# Patient Record
Sex: MALE | Race: WHITE | Employment: FULL TIME | URBAN - METROPOLITAN AREA
[De-identification: names, ages, dates, MRNs, and addresses within clinical notes are randomized per-mention and may not be internally consistent; named-entity substitution may affect disease eponyms.]

---

## 2017-02-21 ENCOUNTER — DOCUMENTATION ONLY (OUTPATIENT)
Dept: VASCULAR SURGERY | Facility: CLINIC | Age: 67
End: 2017-02-21

## 2017-06-15 ENCOUNTER — OFFICE VISIT (OUTPATIENT)
Dept: FAMILY MEDICINE | Facility: CLINIC | Age: 67
End: 2017-06-15
Payer: COMMERCIAL

## 2017-06-15 VITALS
TEMPERATURE: 96.8 F | HEIGHT: 67 IN | HEART RATE: 78 BPM | WEIGHT: 194.2 LBS | RESPIRATION RATE: 20 BRPM | BODY MASS INDEX: 30.48 KG/M2 | OXYGEN SATURATION: 97 % | SYSTOLIC BLOOD PRESSURE: 115 MMHG | DIASTOLIC BLOOD PRESSURE: 70 MMHG

## 2017-06-15 DIAGNOSIS — J02.9 ACUTE VIRAL PHARYNGITIS: Primary | ICD-10-CM

## 2017-06-15 PROCEDURE — 99213 OFFICE O/P EST LOW 20 MIN: CPT | Performed by: FAMILY MEDICINE

## 2017-06-15 ASSESSMENT — PAIN SCALES - GENERAL: PAINLEVEL: MILD PAIN (3)

## 2017-06-15 NOTE — PROGRESS NOTES
"  SUBJECTIVE:                                                    Aquiles Mckeon is a 67 year old male who presents to clinic today for the following health issues:      Acute Illness   Acute illness concerns: Sore throat, Losing Voice, Exposed to Pneumonia  Onset: Yesterday    Fever: no     Chills/Sweats: no     Headache (location?): no     Sinus Pressure:YES- Possible Allergies    Conjunctivitis:  no    Ear Pain: no    Rhinorrhea: YES- Possible Allergies    Congestion: YES- Possibly a light congestion    Sore Throat: YES- 3/10     Cough: no    Wheeze: YES- Maybe a little    Decreased Appetite: no     Nausea: no     Vomiting: no     Diarrhea:  no     Dysuria/Freq.: no     Fatigue/Achiness: no     Sick/Strep Exposure: YES- Daughter has Pneumonia. Was with her over the weekend.      Therapies Tried and outcome: Zinc, Aspirin          Problem list and histories reviewed & adjusted, as indicated.  Additional history: as documented        Reviewed and updated as needed this visit by clinical staff       Reviewed and updated as needed this visit by Provider         ROS:   ROS: 10 point ROS neg other than the symptoms noted above in the HPI.      OBJECTIVE:                                                    /70  Pulse 78  Temp 96.8  F (36  C) (Temporal)  Resp 20  Ht 5' 6.93\" (1.7 m)  Wt 194 lb 3.2 oz (88.1 kg)  SpO2 97%  BMI 30.48 kg/m2  Body mass index is 30.48 kg/(m^2).  GENERAL: healthy, alert and no distress  EYES: Eyes grossly normal to inspection, extraocular movements - intact, and PERRL  HENT: ear canals- normal; TMs- normal; Nose- normal; Mouth- no ulcers, no lesions  HENT: mild redness of the anterior oral pharynx without edema or exudates  NECK: no tenderness, no adenopathy, no asymmetry, no masses, no stiffness; thyroid- normal to palpation  RESP: lungs clear to auscultation - no rales, no rhonchi, no wheezes  CV: regular rates and rhythm, normal S1 S2, no S3 or S4 and no murmur, no click or rub " -  ABDOMEN: soft, no tenderness, no  hepatosplenomegaly, no masses, normal bowel sounds  MS: extremities- no gross deformities noted, no edema  SKIN: no suspicious lesions, no rashes  NEURO: strength and tone- normal, sensory exam- grossly normal, mentation- intact, speech- normal, reflexes- symmetric  PSYCH: Alert and oriented times 3; speech- coherent , normal rate and volume; able to articulate logical thoughts, able to abstract reason, no tangential thoughts, no hallucinations or delusions, affect- normal    Diagnostic test results:  Diagnostic Test Results:  none      ASSESSMENT/PLAN:                                                    1. Acute viral pharyngitis  Clinically appearing to be a viral infection based on findings and history. Plan is for symptomatic therapy and follow up as needed.      Follow up with Provider - Follow up as needed or as planned for ongoing care.   See Patient Instructions    The patient understood the rational for the diagnosis and treatment plan. All questions were answered to best of my ability and the patient's satisfaction.      Steev Garcia MD  [facility]

## 2017-06-15 NOTE — MR AVS SNAPSHOT
"              After Visit Summary   6/15/2017    Aquiles Mckeon    MRN: [id number]           Patient Information     Date Of Birth          1950        Visit Information        Provider Department      6/15/2017 8:40 AM Steve Garcia MD [facility] [place]        Today's Diagnoses     Acute viral pharyngitis    -  1       Follow-ups after your visit        Follow-up notes from your care team     Return if symptoms worsen or fail to improve.      Who to contact     If you have questions or need follow up information about today's clinic visit or your schedule please contact [facility] directly at [phone].  Normal or non-critical lab and imaging results will be communicated to you by MyChart, letter or phone within 4 business days after the clinic has received the results. If you do not hear from us within 7 days, please contact the clinic through [facility] or phone. If you have a critical or abnormal lab result, we will notify you by phone as soon as possible.  Submit refill requests through TM or call your pharmacy and they will forward the refill request to us. Please allow 3 business days for your refill to be completed.          Additional Information About Your Visit        MyChart Information     TM lets you send messages to your doctor, view your test results, renew your prescriptions, schedule appointments and more. To sign up, go to [email or web address] . Click on \"Log in\" on the left side of the screen, which will take you to the Welcome page. Then click on \"Sign up Now\" on the right side of the page.     You will be asked to enter the access code listed below, as well as some personal information. Please follow the directions to create your username and password.     Your access code is: [email or web address]  Expires: 2017  9:00 AM     Your access code will  in 90 days. If you need help or a new code, please call your [facility] clinic or [phone].      " "  Care EveryWhere ID     This is your Care EveryWhere ID. This could be used by other organizations to access your [facility] medical records  [id number]        Your Vitals Were     Pulse Temperature Respirations Height Pulse Oximetry BMI (Body Mass Index)    78 96.8  F (36  C) (Temporal) 20 5' 6.93\" (1.7 m) 97% 30.48 kg/m2       Blood Pressure from Last 3 Encounters:   06/15/17 115/70   09/29/16 132/86   06/06/16 132/80    Weight from Last 3 Encounters:   06/15/17 194 lb 3.2 oz (88.1 kg)   09/29/16 199 lb (90.3 kg)   06/06/16 195 lb 8 oz (88.7 kg)              Today, you had the following     No orders found for display       Primary Care Provider Office Phone # Fax #    Steve Garcia [phone]       [facility] [id number] [place] [id number]        Thank you!     Thank you for choosing [facility]  for your care. Our goal is always to provide you with excellent care. Hearing back from our patients is one way we can continue to improve our services. Please take a few minutes to complete the written survey that you may receive in the mail after your visit with us. Thank you!             Your Updated Medication List - Protect others around you: Learn how to safely use, store and throw away your medicines at www.disposemymeds.org.          This list is accurate as of: 6/15/17  9:00 AM.  Always use your most recent med list.                   Brand Name Dispense Instructions for use    aspirin 325 MG tablet     120 tablet    Take 1.5 tablets (487.5 mg) by mouth daily       MAGNESIUM PO          meloxicam 15 MG tablet    MOBIC     TAKE 1 TABLET BY MOUTH EVERY DAY AS NEEDED       tadalafil 10 MG tablet    CIALIS    12 tablet    Take 0.5-1 tablets (5-10 mg) by mouth daily as needed for erectile dysfunction Never use with nitroglycerin, terazosin or doxazosin.         "

## 2017-06-15 NOTE — NURSING NOTE
"No chief complaint on file.      Initial /90  Pulse 78  Temp 96.8  F (36  C) (Temporal)  Resp 20  Ht 5' 6.93\" (1.7 m)  Wt 194 lb 3.2 oz (88.1 kg)  BMI 30.48 kg/m2 Estimated body mass index is 30.48 kg/(m^2) as calculated from the following:    Height as of this encounter: 5' 6.93\" (1.7 m).    Weight as of this encounter: 194 lb 3.2 oz (88.1 kg).  Medication Reconciliation: complete     Coty Morton CMA  "

## 2018-10-29 ENCOUNTER — OFFICE VISIT (OUTPATIENT)
Dept: FAMILY MEDICINE | Facility: CLINIC | Age: 68
End: 2018-10-29
Payer: COMMERCIAL

## 2018-10-29 VITALS
TEMPERATURE: 98.5 F | BODY MASS INDEX: 31.71 KG/M2 | HEIGHT: 67 IN | HEART RATE: 83 BPM | DIASTOLIC BLOOD PRESSURE: 76 MMHG | WEIGHT: 202 LBS | SYSTOLIC BLOOD PRESSURE: 122 MMHG | OXYGEN SATURATION: 96 %

## 2018-10-29 DIAGNOSIS — J20.9 ACUTE BRONCHITIS, UNSPECIFIED ORGANISM: ICD-10-CM

## 2018-10-29 DIAGNOSIS — J02.9 ACUTE PHARYNGITIS, UNSPECIFIED ETIOLOGY: Primary | ICD-10-CM

## 2018-10-29 PROCEDURE — 99213 OFFICE O/P EST LOW 20 MIN: CPT | Performed by: FAMILY MEDICINE

## 2018-10-29 RX ORDER — AZITHROMYCIN 250 MG/1
500 TABLET, FILM COATED ORAL DAILY
Qty: 10 TABLET | Refills: 0 | Status: SHIPPED | OUTPATIENT
Start: 2018-10-29 | End: 2019-01-07

## 2018-10-29 RX ORDER — CLINDAMYCIN HCL 300 MG
CAPSULE ORAL
Refills: 1 | COMMUNITY
Start: 2018-07-17 | End: 2019-01-07

## 2018-10-29 ASSESSMENT — PAIN SCALES - GENERAL: PAINLEVEL: MODERATE PAIN (4)

## 2018-10-29 NOTE — PROGRESS NOTES
"  SUBJECTIVE:   Aquiles Mckeon is a 68 year old male who presents to clinic today for the following health issues:      HPI  Acute Illness--    Symptoms of a persistent and slightly progressive sore throat with early symptoms of developing a lower respiratory tract infection. No major fever noted. Former smoker. No major dyspnea. No sinus related symptoms noted.     Acute illness concerns: sore throat, cough    Onset: 4 days now     Fever: no     Chills/Sweats: no     Headache (location?): YES- some     Sinus Pressure:no    Conjunctivitis:  no    Ear Pain: no    Rhinorrhea: no     Congestion: YES- chest     Sore Throat: YES     Cough: YES-productive of yellow sputum    Wheeze: no     Decreased Appetite: no    Nausea: no    Vomiting: no    Diarrhea:  YES- one time     Dysuria/Freq.: no    Fatigue/Achiness: YES    Sick/Strep Exposure: no      Therapies Tried and outcome: day/Nyquil     Problem list and histories reviewed & adjusted, as indicated.  Additional history: as documented        BP Readings from Last 3 Encounters:   10/29/18 122/76   06/15/17 115/70   09/29/16 132/86    Wt Readings from Last 3 Encounters:   10/29/18 202 lb (91.6 kg)   06/15/17 194 lb 3.2 oz (88.1 kg)   09/29/16 199 lb (90.3 kg)                    ROS:   ROS: 10 point ROS neg other than the symptoms noted above in the HPI.      OBJECTIVE:                                                    /76  Pulse 83  Temp 98.5  F (36.9  C) (Oral)  Ht 5' 7\" (1.702 m)  Wt 202 lb (91.6 kg)  SpO2 96%  BMI 31.64 kg/m2  Body mass index is 31.64 kg/(m^2).  GENERAL: healthy, alert and no distress  EYES: Eyes grossly normal to inspection, extraocular movements - intact, and PERRL  HENT: ear canals and TM's normal and some redness of the oral pharynx without soft tissue edema. Post oral pharyngeal surgery for sleep apnea.  NECK: no tenderness, no adenopathy, no asymmetry, no masses, no stiffness; thyroid- normal to palpation  RESP: lungs clear to " auscultation - no rales, no rhonchi, no wheezes  CV: regular rates and rhythm, normal S1 S2, no S3 or S4 and no murmur, no click or rub -  ABDOMEN: soft, no tenderness, no  hepatosplenomegaly, no masses, normal bowel sounds  MS: extremities- no gross deformities noted, no edema  SKIN: no suspicious lesions, no rashes  NEURO: strength and tone- normal, sensory exam- grossly normal, mentation- intact, speech- normal, reflexes- symmetric  PSYCH: Alert and oriented times 3; speech- coherent , normal rate and volume; able to articulate logical thoughts, able to abstract reason, no tangential thoughts, no hallucinations or delusions, affect- normal    Diagnostic test results:  Diagnostic Test Results:  none      ASSESSMENT/PLAN:                                                    1. Acute pharyngitis, unspecified etiology  Not suspecting strep but with progressive symptoms and developing lower respiratory symptoms will empirically treat with Zithromax.  - azithromycin (ZITHROMAX) 250 MG tablet; Take 2 tablets (500 mg) by mouth daily  Dispense: 10 tablet; Refill: 0    2. Acute bronchitis, unspecified organism  Treating empirically with oral Zithromax.  - azithromycin (ZITHROMAX) 250 MG tablet; Take 2 tablets (500 mg) by mouth daily  Dispense: 10 tablet; Refill: 0      Follow up with Provider - General physical due in the next 3 months.  See Patient Instructions    The patient understood the rational for the diagnosis and treatment plan. All questions were answered to best of my ability and the patient's satisfaction.      Steve Garcia MD  [facility]

## 2018-10-29 NOTE — MR AVS SNAPSHOT
After Visit Summary   10/29/2018    Aquiles Mckeon    MRN: [id number]           Patient Information     Date Of Birth          1950        Visit Information        Provider Department      10/29/2018 9:40 AM Steve Garcia MD [facility] [place]        Today's Diagnoses     Acute pharyngitis, unspecified etiology    -  1    Acute bronchitis, unspecified organism          Care Instructions    These are new changes to your current plan of care based on today's visit:    Medications stopped    Medications to be started Zithromax 500 mg daily for five days   Change dose of this medication    New treatments        Follow up appointments:    1.  FOLLOW UP WITH YOUR PRIMARY CARE PROVIDER: As needed. Schedule a general physical with fasting blood work.    Steve Garcia MD                Follow-ups after your visit        Follow-up notes from your care team     Return in about 3 months (around 1/29/2019) for Physical Exam, Lab Work.      Who to contact     If you have questions or need follow up information about today's clinic visit or your schedule please contact [facility] directly at [phone].  Normal or non-critical lab and imaging results will be communicated to you by MyChart, letter or phone within 4 business days after the clinic has received the results. If you do not hear from us within 7 days, please contact the clinic through [facility] or phone. If you have a critical or abnormal lab result, we will notify you by phone as soon as possible.  Submit refill requests through [facility] or call your pharmacy and they will forward the refill request to us. Please allow 3 business days for your refill to be completed.          Additional Information About Your Visit        MyChart Information     [facility] lets you send messages to your doctor, view your test results, renew your prescriptions, schedule appointments and more. To sign up, go to [email or web address] . Click on  "\"Log in\" on the left side of the screen, which will take you to the Welcome page. Then click on \"Sign up Now\" on the right side of the page.     You will be asked to enter the access code listed below, as well as some personal information. Please follow the directions to create your username and password.     Your access code is: [email or web address]  Expires: 2019 10:08 AM     Your access code will  in 90 days. If you need help or a new code, please call your [facility] or [phone].        Care EveryWhere ID     This is your Care EveryWhere ID. This could be used by other organizations to access your [facility] medical records  [id number]        Your Vitals Were     Pulse Temperature Height Pulse Oximetry BMI (Body Mass Index)       83 98.5  F (36.9  C) (Oral) 5' 7\" (1.702 m) 96% 31.64 kg/m2        Blood Pressure from Last 3 Encounters:   10/29/18 122/76   06/15/17 115/70   16 132/86    Weight from Last 3 Encounters:   10/29/18 202 lb (91.6 kg)   06/15/17 194 lb 3.2 oz (88.1 kg)   16 199 lb (90.3 kg)              Today, you had the following     No orders found for display         Today's Medication Changes          These changes are accurate as of 10/29/18 10:09 AM.  If you have any questions, ask your nurse or doctor.               Start taking these medicines.        Dose/Directions    azithromycin 250 MG tablet   Commonly known as:  ZITHROMAX   Used for:  Acute pharyngitis, unspecified etiology, Acute bronchitis, unspecified organism   Started by:  Steve Garcia MD        Dose:  500 mg   Take 2 tablets (500 mg) by mouth daily   Quantity:  10 tablet   Refills:  0            Where to get your medicines      These medications were sent to [facility]/pharmacy #[id number] - [place] - [address] AT [facility]  [address] [place] [address]     Phone:  [phone]     azithromycin 250 MG tablet                Primary Care Provider Office Phone # Fax #    " Steve Garcia -[phone]       [address] [facility] [address]        Equal Access to Services     SHAHEEN GUTIERREZ : Hadii milton walker percy March, waemilyda luqgeovanna, qamaita kaalmada sebastian, michael collinsgee brianne. So [facility] [phone].    ATENCIÓN: Si habla español, tiene a gerard disposición servicios gratuitos de asistencia lingüística. Llame al [phone].    We comply with applicable federal civil rights laws and [place] laws. We do not discriminate on the basis of race, color, national origin, age, disability, sex, sexual orientation, or gender identity.            Thank you!     Thank you for choosing [facility]  for your care. Our goal is always to provide you with excellent care. Hearing back from our patients is one way we can continue to improve our services. Please take a few minutes to complete the written survey that you may receive in the mail after your visit with us. Thank you!             Your Updated Medication List - Protect others around you: Learn how to safely use, store and throw away your medicines at www.disposemymeds.org.          This list is accurate as of 10/29/18 10:09 AM.  Always use your most recent med list.                   Brand Name Dispense Instructions for use Diagnosis    ANTIHISTAMINE PO      Take 1 tablet daily for itching.        aspirin 325 MG tablet     120 tablet    Take 1.5 tablets (487.5 mg) by mouth daily        azithromycin 250 MG tablet    ZITHROMAX    10 tablet    Take 2 tablets (500 mg) by mouth daily    Acute pharyngitis, unspecified etiology, Acute bronchitis, unspecified organism       clindamycin 300 MG capsule    CLEOCIN     TAKE 2 CAPSULES BY MOUTH 1 HOUR PRIOR TO DENTAL APPOINTMENT.        MAGNESIUM PO           meloxicam 15 MG tablet    MOBIC     TAKE 1 TABLET BY MOUTH EVERY DAY AS NEEDED

## 2018-10-29 NOTE — PATIENT INSTRUCTIONS
These are new changes to your current plan of care based on today's visit:    Medications stopped    Medications to be started Zithromax 500 mg daily for five days   Change dose of this medication    New treatments        Follow up appointments:    1.  FOLLOW UP WITH YOUR PRIMARY CARE PROVIDER: As needed. Schedule a general physical with fasting blood work.    Steve Garcia MD

## 2019-01-07 ENCOUNTER — OFFICE VISIT (OUTPATIENT)
Dept: FAMILY MEDICINE | Facility: CLINIC | Age: 69
End: 2019-01-07
Payer: COMMERCIAL

## 2019-01-07 VITALS
DIASTOLIC BLOOD PRESSURE: 78 MMHG | SYSTOLIC BLOOD PRESSURE: 120 MMHG | HEART RATE: 88 BPM | WEIGHT: 200.1 LBS | BODY MASS INDEX: 32.16 KG/M2 | TEMPERATURE: 97.2 F | HEIGHT: 66 IN | RESPIRATION RATE: 18 BRPM

## 2019-01-07 DIAGNOSIS — M54.50 ACUTE LEFT-SIDED LOW BACK PAIN WITHOUT SCIATICA: Primary | ICD-10-CM

## 2019-01-07 DIAGNOSIS — Z87.891 PERSONAL HISTORY OF TOBACCO USE: ICD-10-CM

## 2019-01-07 PROCEDURE — 99214 OFFICE O/P EST MOD 30 MIN: CPT | Performed by: PHYSICIAN ASSISTANT

## 2019-01-07 PROCEDURE — G0296 VISIT TO DETERM LDCT ELIG: HCPCS | Performed by: PHYSICIAN ASSISTANT

## 2019-01-07 RX ORDER — METHOCARBAMOL 750 MG/1
750 TABLET, FILM COATED ORAL 3 TIMES DAILY
Qty: 30 TABLET | Refills: 0 | Status: SHIPPED | OUTPATIENT
Start: 2019-01-07 | End: 2021-03-11

## 2019-01-07 RX ORDER — MELOXICAM 15 MG/1
15 TABLET ORAL DAILY
Qty: 20 TABLET | Refills: 0 | Status: SHIPPED | OUTPATIENT
Start: 2019-01-07 | End: 2019-01-23

## 2019-01-07 ASSESSMENT — PAIN SCALES - GENERAL: PAINLEVEL: SEVERE PAIN (7)

## 2019-01-07 ASSESSMENT — MIFFLIN-ST. JEOR: SCORE: 1619.53

## 2019-01-07 NOTE — PATIENT INSTRUCTIONS
Low back pain-  Ice or heat    Meloxicam- this is an NSAID. Short course of NSAIDs (as discussed or AVS), take with food, stop if GI upset occurs  NSAIDs alleviate pain and treat inflammation.   You are being prescribed an NSAID. Do not take any other over the counter NSAIDs or prescription NSAIDs when on this medication (examples include Advil, Aleve, Motrin, ibuprofen, diclofenac, meloxicam, indomethacin).  Take with food. Stop taking if you get an upset stomach.   Hydrate well when you take this medication.     Hold the aspirin when you are taking the meloxicam.    Methocarbamol- You were prescribed a muscle relaxer. This can make you dizzy and/or drowsy.   Do NOT drink alcohol while taking.   Try for the first time at home (ie before bed) so you know how it affects you.   Do not drive while taking if you feel dizzy or drowsy.     When You Have Low Back Pain    Caring for Your Back  You are not alone.    Low back pain is very common. Nearly half of all adults have low back pain in any given year. The good news is that back pain is rarely a danger to your health. Most people can manage their back pain on their own and about half of them start feeling better within 2 weeks. In 9 out of 10 cases, low back pain goes away or no longer limits daily activity within 6 weeks.     Your outlook is good!    Your symptoms tell us that your low back pain is most likely not a danger to you. Most of the time we do not know the exact cause of low back pain, even if you see a doctor or have an MRI. However, treatment can still work without knowing the cause of the pain. Less than 1 in 100 people need surgery for their back pain.     What can I do about my low back pain?     There are three things you can do to ease low back pain and help it go away.    Use heat or cold packs.    Take medicine as directed.    Use positions, movements and exercises.     Using heat or cold packs    Try cold packs or gentle heat to ease your pain.  Use whichever gives you the most relief. Apply the cold pack or heat for 15 minutes at a time, as often as needed.    Taking medicine      If your doctor has prescribed medicine, be sure to follow the directions.    If you take over-the-counter medicine, read and follow the directions.    Talk to your doctor if you have any questions.     Using positions, movements and exercises    Research tells us that moving your joints and muscles can help you recover from back pain. Such activity should be simple and gentle. Use the positions in the photos as well as walking to help relieve your pain. Try taking a short walk every 3 to 4 hours during the day. Walk for a few minutes inside your home or take longer walks outside, on a treadmill or at a mall. Slowly increase the amount of time you walk. Expect discomfort when you begin, but it should lessen as your back starts to heal. When your back feels better, walk daily to keep your back and body healthy.    Finding a comfortable position    When your back pain is new, certain positions will ease your pain. Gently try each of the positions below until you find one that is helpful. Once you find a position of comfort, use it as often as you like when you are resting. You will recover faster if you combine rest with activity.         Lie on your back with your legs bent. You can do this by placing a pillow under your knees. Or you may lie on the floor and rest your lower legs on the seat of a chair.       Lie on your side with your knees bent, and place a pillow between your knees.       Lie on your stomach over pillows.      When should I call my doctor?    Your back pain should improve over the first couple of weeks. As it improves, you should be able to return to your normal activities. But call your doctor if:    You have a sudden change in your ability to control your bladder or bowels.    You feel tingling in your groin or legs.    The pain spreads down your leg and into  your foot.    Your toes, feet or leg muscles feel weak.    You feel generally unwell or sick.    Your pain does not get better or gets worse.      If you are deaf or hard of hearing, please let us know. We provide many free services including sign language interpreters,oral interpreters, TTYs, telephone amplifiers, note takers and written materials.    For informational purposes only. Not to replace the advice of your health care provider. Copyright   2013 [place] [employer] [facility]. All rights reserved. [employer] 134853 - Rev 06/14.      Lung Cancer Screening   Frequently Asked Questions  If you are at high-risk for lung cancer, getting screened with low-dose computed tomography (LDCT) every year can help save your life. This handout offers answers to some of the most common questions about lung cancer screening. If you have other questions, please call [phone] ([phone]).     What is it?  Lung cancer screening uses special X-ray technology to create an image of your lung tissue. The exam is quick and easy and takes less than 10 seconds. We don t give you any medicine or use any needles. You can eat before and after the exam. You don t need to change your clothes as long as the clothing on your chest doesn t contain metal. But, you do need to be able to hold your breath for at least 6 seconds during the exam.    What is the goal of lung cancer screening?  The goal of lung cancer screening is to save lives. Many times, lung cancer is not found until a person starts having physical symptoms. Lung cancer screening can help detect lung cancer in the earliest stages when it may be easier to treat.    Who should be screened for lung cancer?  We suggest lung cancer screening for anyone who is at high-risk for lung cancer. You are in the high-risk group if you:      are between the ages of 55 and 79, and    have smoked at least 1 pack of cigarettes a day for 30 or more years, and    still smoke or have quit  within the past 15 years.    However, if you have a new cough or shortness of breath, you should talk to your doctor before being screened.    Some national lung health advocacy groups also recommend screening for people ages 50 to 79 who have smoked an average of 1 pack of cigarettes a day for 20 years. They must also have at least 1 other risk factor for lung cancer, not including exposure to secondhand smoke. Other risk factors are having had cancer in the past, emphysema, pulmonary fibrosis, COPD, a family history of lung cancer, or exposure to certain materials such as arsenic, asbestos, beryllium, cadmium, chromium, diesel fumes, nickel, radon or silica. Your care team can help you know if you have one of these risk factors.     Why does it matter if I have symptoms?  Certain symptoms can be a sign that you have a condition in your lungs that should be checked and treated by your doctor. These symptoms include fever, chest pain, a new or changing cough, shortness of breath that you have never felt before, coughing up blood or unexplained weight loss. Having any of these symptoms can greatly affect the results of lung cancer screening.       Should all smokers get an LDCT lung cancer screening exam?  It depends. Lung cancer screening is for a very specific group of men and women who have a history of heavy smoking over a long period of time (see  Who should be screened for lung cancer  above).  I am in the high-risk group, but have been diagnosed with cancer in the past. Is LDCT lung cancer screening right for me?  In some cases, you should not have LDCT lung screening, such as when your doctor is already following your cancer with CT scan studies. Your doctor will help you decide if LDCT lung screening is right for you.  Do I need to have a screening exam every year?  Yes. If you are in the high-risk group described earlier, you should get an LDCT lung cancer screening exam every year until you are 79, or are  no longer willing or able to undergo screening and possible procedures to diagnose and treat lung cancer.  How effective is LDCT at preventing death from lung cancer?  Studies have shown that LDCT lung cancer screening can lower the risk of death from lung cancer by 20 percent in people who are at high-risk.  What are the risks?  There are some risks and limitations of LDCT lung cancer screening. We want to make sure you understand the risks and benefits, so please let us know if you have any questions. Your doctor may want to talk with you more about these risks.    Radiation exposure: As with any exam that uses radiation, there is a very small increased risk of cancer. The amount of radiation in LDCT is small--about the same amount a person would get from a mammogram. Your doctor orders the exam when he or she feels the potential benefits outweigh the risks.    False negatives: No test is perfect, including LDCT. It is possible that you may have a medical condition, including lung cancer, that is not found during your exam. This is called a false negative result.    False positives and more testing: LDCT very often finds something in the lung that could be cancer, but in fact is not. This is called a false positive result. False positive tests often cause anxiety. To make sure these findings are not cancer, you may need to have more tests. These tests will be done only if you give us permission. Sometimes patients need a treatment that can have side effects, such as a biopsy. For more information on false positives, see  What can I expect from the results?     Findings not related to lung cancer: Your LDCT exam also takes pictures of areas of your body next to your lungs. In a very small number of cases, the CT scan will show an abnormal finding in one of these areas, such as your kidneys, adrenal glands, liver or thyroid. This finding may not be serious, but you may need more tests. Your doctor can help you  decide what other tests you may need, if any.  What can I expect from the results?  About 1 out of 4 LDCT exams will find something that may need more tests. Most of the time, these findings are lung nodules. Lung nodules are very small collections of tissue in the lung. These nodules are very common, and the vast majority--more than 97 percent--are not cancer (benign). Most are normal lymph nodes or small areas of scarring from past infections.  But, if a small lung nodule is found to be cancer, the cancer can be cured more than 90 percent of the time. To know if the nodule is cancer, we may need to get more images before your next yearly screening exam. If the nodule has suspicious features (for example, it is large, has an odd shape or grows over time), we will refer you to a specialist for further testing.  Will my doctor also get the results?  Yes. Your doctor will get a copy of your results.  Is it okay to keep smoking now that there s a cancer screening exam?  No. Tobacco is one of the strongest cancer-causing agents. It causes not only lung cancer, but other cancers and cardiovascular (heart) diseases as well. The damage caused by smoking builds over time. This means that the longer you smoke, the higher your risk of disease. While it is never too late to quit, the sooner you quit, the better.  Where can I find help to quit smoking?  The best way to prevent lung cancer is to stop smoking. If you have already quit smoking, congratulations and keep it up! For help on quitting smoking, please call [facility] at 8-935-795-WIQX ([phone]) or the American [employer] Society at 1-694.803.5762 to find local resources near you.  One-on-one health coaching:  If you d prefer to work individually with a health care provider on tobacco cessation, we offer:      Medication Therapy Management:  Our specially trained pharmacists work closely with you and your doctor to help you quit smoking.  Call [phone] or [phone] (toll  free).     Can Do: Health coaching offered by [facility].  [email or web address]

## 2019-01-07 NOTE — PROGRESS NOTES
"  SUBJECTIVE:   Aquiles Mckeon is a 68 year old male who presents to clinic today for the following health issues:  Patient no longer taking clindamycin. Please remove if appropriate.    History of Present Illness   Frequency of exercise:  2-3 days/week  Duration of exercise:  15-30 minutes  Taking medications regularly:  Yes  Medication side effects:  None  Additional concerns today:  No    Back Pain       Duration: 2 weeks        Specific cause: lifting  Life long- initial injury when young playing hockey. Normally no pain w/daily activity, but flares up now and again.   Most recently can't recall specific event. Did do some lifting of a dresser from [employer] up stairs and came on after.  Does ok during the day.   More pain with position change, sitting to standing. Harder to get up from laying.  Golf trip end of month and wants to be able to play.   No N/T, no weakness in legs. No bowel or bladder changes. No fevers/chills.   No trauma/falls prior to this.     Description:   Location of pain: low back left  Character of pain: sharp and \"uncomfortable\"  Pain radiation:radiates into the left buttocks. Not down backs of legs.   New numbness or weakness in legs, not attributed to pain:  no     Intensity: Currently 6-7/10    History:   Pain interferes with job: Not applicable  History of back problems: previous back injury when younger playing hockey  Any previous MRI or X-rays: None  Sees a specialist for back pain:  No  Therapies tried without relief: none    Alleviating factors:   Improved by: old meloxicam and metaxalone. Patient states these medications gave him relief, has both bottles with from 2016.  Also recalls prednisone helping in the past too    Precipitating factors:  Worsened by: Lying [place] and when going to get up.      Accompanying Signs & Symptoms:  Risk of Fracture:  Did slip backward on ice when snow blowing week ago.   Risk of Cauda Equina:  None  Risk of Infection:  None  Risk of Cancer:  None  Risk " of Ankylosing Spondylitis:  Onset at age <35, male, AND morning back stiffness. no         Problem list and histories reviewed & adjusted, as indicated.  Additional history: as documented      Patient Active Problem List   Diagnosis     Hyperlipidemia with target LDL less than 160     History of colonic polyps     Erectile dysfunction, unspecified erectile dysfunction type     Advanced directives, counseling/discussion     History of tobacco use     Past Surgical History:   Procedure Laterality Date     COLONOSCOPY  2001    Reported as having a polyp removed     COLONOSCOPY  2004    Clear by report     COLONOSCOPY N/A 1/29/2016    Procedure: COLONOSCOPY;  Surgeon: Juanjo Martínez MD;  Location: [facility] OR     COLONOSCOPY WITH CO2 INSUFFLATION N/A 1/29/2016    Procedure: COLONOSCOPY WITH CO2 INSUFFLATION;  Surgeon: Juanjo Martínez MD;  Location: [facility] OR       Social History     Tobacco Use     Smoking status: Former Smoker     Packs/day: 0.80     Years: 40.00     Pack years: 32.00     Types: Cigarettes     Last attempt to quit: 5/1/2015     Years since quitting: 3.6     Smokeless tobacco: Never Used     Tobacco comment: quit smoking 5/01/2015   Substance Use Topics     Alcohol use: Yes     Alcohol/week: 7.0 oz     Types: 14 Standard drinks or equivalent per week     Comment: Beer mainly-5-6 per week     Family History   Problem Relation Age of Onset     Heart Disease Father         CHF     Diabetes No family hx of      Hypertension No family hx of      Breast Cancer No family hx of      Prostate Cancer No family hx of      Mental Illness No family hx of      Osteoporosis No family hx of      Obesity No family hx of          Current Outpatient Medications   Medication Sig Dispense Refill     aspirin 325 MG tablet Take 1.5 tablets (487.5 mg) by mouth daily 120 tablet      MAGNESIUM PO        Triprolidine-Pseudoephedrine (ANTIHISTAMINE PO) Take 1 tablet daily for itching.       meloxicam (MOBIC) 15 MG  "tablet TAKE 1 TABLET BY MOUTH EVERY DAY AS NEEDED  0     Allergies   Allergen Reactions     Ampicillin      BP Readings from Last 3 Encounters:   01/07/19 124/90   10/29/18 122/76   06/15/17 115/70    Wt Readings from Last 3 Encounters:   01/07/19 90.8 kg (200 lb 1.6 oz)   10/29/18 91.6 kg (202 lb)   06/15/17 88.1 kg (194 lb 3.2 oz)                  Labs reviewed in EPIC    ROS:  Constitutional, HEENT, cardiovascular, pulmonary, gi and gu systems are negative, except as otherwise noted.    OBJECTIVE:     /78   Pulse 88   Temp 97.2  F (36.2  C) (Temporal)   Resp 18   Ht 1.667 m (5' 5.63\")   Wt 90.8 kg (200 lb 1.6 oz)   BMI 32.66 kg/m    Body mass index is 32.66 kg/m .  GENERAL: healthy, alert and no distress  MS: Lumbar Spine: No skin changes noted. Tender with palpation of lumbar para-spinous muscles and upper buttocks.  Non-tender into gluteal area and with pressure along posterior thigh. Not TTP into calf muscle.  Has normal forward bending to nearly 90 degrees; normal twisting, lateral bending of back. No pain with back extension.  Is able to walk on heels and toes without difficulty.  Normal gait.   Strength 5/5 with leg extension, ankle ROM, and great toe flex/ext.  Straight leg raise normal. No edema. Pulses normal. Patellar reflexes 2+ and symmetric. Achilles reflexes 1-2+ symmetric. Sensation intact to light touch of LE.   Hip: left: nontender into groin or anterior thigh, normal ROM    Diagnostic Test Results:  none     ASSESSMENT/PLAN:     1. Acute left-sided low back pain without sciatica  Short course of meloxicam- take with food. Stop if GI upset. (Normal kidney function, no ulcer hx)  Muscle relaxer at bedtime, precautions discussed- no alcohol use or driving  Stretching, ice/heat  Follow up if sx not improving  - methocarbamol (ROBAXIN) 750 MG tablet; Take 1 tablet (750 mg) by mouth 3 times daily  Dispense: 30 tablet; Refill: 0  - meloxicam (MOBIC) 15 MG tablet; Take 1 tablet (15 mg) by " mouth daily  Dispense: 20 tablet; Refill: 0    2. Personal history of tobacco use  Counseled on lung cancer screening. He has been quit 3 yrs. He has a 40packyrhx.   He is interested in screening  - Prof Fee: Shared Decision Making Visit for Lung Cancer Screening  - CT Chest Lung Cancer Scrn Low Dose wo; Future    Follow Up: The patient was instructed to contact clinic for worsening symptoms, non-improvement as expected/discussed, and for questions regarding medications or treatment plan. Discussed parameters for follow up and included in After Visit Summary given to patient.      Lyubov Almanza PA-C    [facility]  Lung Cancer Screening Shared Decision Making Visit     Aquiles Mckeon is eligible for lung cancer screening on the basis of the information provided in my signed lung cancer screening order.     I have discussed with patient the risks and benefits of screening for lung cancer with low-dose CT.     The risks include:  radiation exposure: one low dose chest CT has as much ionizing radiation as about 15 chest x-rays or 6 months of background radiation living in [place]    false positives: 96% of positive findings/nodules are NOT cancer, but some might still require additional diagnostic evaluation, including biopsy  over-diagnosis: some slow growing cancers that might never have been clinically significant will be detected and treated unnecessarily     The benefit of early detection of lung cancer is contingent upon adherence to annual screening or more frequent follow up if indicated.     Furthermore, reaping the benefits of screening requires Aquiles Mckeon to be willing and physically able to undergo diagnostic procedures, if indicated. Although no specific guide is available for determining severity of comorbidities, it is reasonable to withhold screening in patients who have greater mortality risk from other diseases.     We did discuss that the only way to prevent lung cancer is to not  smoke. Smoking cessation assistance was not offered- he is not smoking.     I did not offer risk estimation using a calculator such as this one:    ShouldIScreen

## 2019-01-23 DIAGNOSIS — M54.50 ACUTE LEFT-SIDED LOW BACK PAIN WITHOUT SCIATICA: ICD-10-CM

## 2019-01-24 RX ORDER — MELOXICAM 15 MG/1
TABLET ORAL
Qty: 20 TABLET | Refills: 0 | Status: SHIPPED | OUTPATIENT
Start: 2019-01-27 | End: 2021-03-11

## 2019-01-24 NOTE — TELEPHONE ENCOUNTER
"Requested Prescriptions   Pending Prescriptions Disp Refills     meloxicam (MOBIC) 15 MG tablet [Pharmacy Med Name: MELOXICAM 15 MG TABLET] 20 tablet 0     Sig: TAKE 1 TABLET BY MOUTH EVERY DAY    NSAID Medications Failed - 1/23/2019  8:54 PM       Failed - Normal ALT on file in past 12 months    No lab results found.         Failed - Normal AST on file in past 12 months    No lab results found.         Failed - Patient is age 6-64 years       Failed - Normal CBC on file in past 12 months    Recent Labs   Lab Test 09/29/16  1350   WBC 9.6   RBC 4.88   HGB 14.4   HCT 44.2                   Failed - Normal serum creatinine on file in past 12 months    Recent Labs   Lab Test 09/29/16  1350   CR 1.05            Passed - Blood pressure under 140/90 in past 12 months    BP Readings from Last 3 Encounters:   01/07/19 120/78   10/29/18 122/76   06/15/17 115/70                Passed - Recent (12 mo) or future (30 days) visit within the authorizing provider's specialty    Patient had office visit in the last 12 months or has a visit in the next 30 days with authorizing provider or within the authorizing provider's specialty.  See \"Patient Info\" tab in inbasket, or \"Choose Columns\" in Meds & Orders section of the refill encounter.             Passed - Medication is active on med list        Routing refill request to provider for review/approval because:  Eder Caro RN, BSN              "

## 2019-05-03 ENCOUNTER — OFFICE VISIT (OUTPATIENT)
Dept: FAMILY MEDICINE | Facility: CLINIC | Age: 69
End: 2019-05-03
Payer: COMMERCIAL

## 2019-05-03 VITALS
SYSTOLIC BLOOD PRESSURE: 120 MMHG | OXYGEN SATURATION: 95 % | DIASTOLIC BLOOD PRESSURE: 76 MMHG | RESPIRATION RATE: 18 BRPM | HEART RATE: 79 BPM | HEIGHT: 66 IN | WEIGHT: 200 LBS | BODY MASS INDEX: 32.14 KG/M2 | TEMPERATURE: 98.6 F

## 2019-05-03 DIAGNOSIS — J20.9 ACUTE BRONCHITIS WITH SYMPTOMS > 10 DAYS: Primary | ICD-10-CM

## 2019-05-03 PROCEDURE — 99214 OFFICE O/P EST MOD 30 MIN: CPT | Performed by: PHYSICIAN ASSISTANT

## 2019-05-03 RX ORDER — AZITHROMYCIN 250 MG/1
TABLET, FILM COATED ORAL
Qty: 6 TABLET | Refills: 0 | Status: SHIPPED | OUTPATIENT
Start: 2019-05-03 | End: 2020-01-23

## 2019-05-03 ASSESSMENT — MIFFLIN-ST. JEOR: SCORE: 1614.07

## 2019-05-03 NOTE — PROGRESS NOTES
"  SUBJECTIVE:   Aquiles Mckeon is a 69 year old male who presents to clinic today for the following health issues:      HPI     Acute Illness   Acute illness concerns: cough, illness  Onset: Saturday before Easter, off and on  Started ~ 10d ago. Golfed and started to feel sick while he was playing. Sore throat, coughing up mucus, chest congestion.   3 nights ago felt mildly nauseated, \"extra saliva\" in throat, had diarrhea when he left work- that lasted 2 days.   Coughing worse. Yellow to brown mucus. Little bit SOB. No chest pain. No pleuritic pain.   No recent fevers.   Feeling a little bit better today.   Quit smoking- 4 yrs ago.   No underlying asthma, known COPD.   Dayquil,nyquil     Fever: no    Chills/Sweats: no    Headache (location?): YES- the last two days    Sinus Pressure:YES- facial pain- little bit    Conjunctivitis:  no    Ear Pain: no    Rhinorrhea: YES - minor    Congestion: no    Sore Throat: no     Cough: YES-productive of yellow to brownish green sputum    Wheeze: no    Decreased Appetite: no    Nausea: no    Vomiting: no    Diarrhea:  YES- the last few days, but not yesterday. Ended yesterday- no bloody or tarry.     Dysuria/Freq.: no    Fatigue/Achiness: YES    Sick/Strep Exposure: YES - wife has pneumonia    No rashes.   Work- sells cars   No LE edema, pain.      Therapies Tried and outcome: dayquil/nyquil, zycam, zync tablets    Additional history: as documented    Reviewed and updated as needed this visit by clinical staff         Reviewed and updated as needed this visit by Provider             Patient Active Problem List   Diagnosis     Hyperlipidemia with target LDL less than 160     History of colonic polyps     Erectile dysfunction, unspecified erectile dysfunction type     Advanced directives, counseling/discussion     History of tobacco use     Past Surgical History:   Procedure Laterality Date     COLONOSCOPY  2001    Reported as having a polyp removed     COLONOSCOPY  2004    Clear " by report     COLONOSCOPY N/A 2016    Procedure: COLONOSCOPY;  Surgeon: Juanjo Martínez MD;  Location: [facility] OR     COLONOSCOPY WITH CO2 INSUFFLATION N/A 2016    Procedure: COLONOSCOPY WITH CO2 INSUFFLATION;  Surgeon: Juanjo Martínez MD;  Location: [facility] OR       Social History     Tobacco Use     Smoking status: Former Smoker     Packs/day: 0.80     Years: 40.00     Pack years: 32.00     Types: Cigarettes     Last attempt to quit: 2015     Years since quittin.0     Smokeless tobacco: Never Used     Tobacco comment: quit smoking 2015   Substance Use Topics     Alcohol use: Yes     Alcohol/week: 7.0 oz     Types: 14 Standard drinks or equivalent per week     Comment: Beer mainly-5-6 per week     Family History   Problem Relation Age of Onset     Heart Disease Father         CHF     Diabetes No family hx of      Hypertension No family hx of      Breast Cancer No family hx of      Prostate Cancer No family hx of      Mental Illness No family hx of      Osteoporosis No family hx of      Obesity No family hx of          Current Outpatient Medications   Medication Sig Dispense Refill     aspirin 325 MG tablet Take 1.5 tablets (487.5 mg) by mouth daily 120 tablet      MAGNESIUM PO        meloxicam (MOBIC) 15 MG tablet TAKE 1 TABLET BY MOUTH EVERY DAY 20 tablet 0     methocarbamol (ROBAXIN) 750 MG tablet Take 1 tablet (750 mg) by mouth 3 times daily 30 tablet 0     Triprolidine-Pseudoephedrine (ANTIHISTAMINE PO) Take 1 tablet daily for itching.       Allergies   Allergen Reactions     Ampicillin      BP Readings from Last 3 Encounters:   19 120/76   19 120/78   10/29/18 122/76    Wt Readings from Last 3 Encounters:   19 90.7 kg (200 lb)   19 90.8 kg (200 lb 1.6 oz)   10/29/18 91.6 kg (202 lb)                  Labs reviewed in EPIC    ROS:  Constitutional, HEENT, cardiovascular, pulmonary, gi and gu systems are negative, except as otherwise noted.    OBJECTIVE:  "    /76   Pulse 79   Temp 98.6  F (37  C) (Temporal)   Resp 18   Ht 1.667 m (5' 5.63\")   Wt 90.7 kg (200 lb)   SpO2 95%   BMI 32.65 kg/m    Body mass index is 32.65 kg/m .  GENERAL: healthy, alert and no distress  EYES: Eyes grossly normal to inspection, PERRL and conjunctivae and sclerae normal  HENT: ear canals and TM's normal, nose and mouth without ulcers or lesions  NECK: no adenopathy, no asymmetry, masses, or scars and thyroid normal to palpation  RESP: lungs clear to auscultation - no rales, rhonchi or wheezes  CV: regular rate and rhythm, normal S1 S2, no S3 or S4, no murmur, click or rub, no peripheral edema and peripheral pulses strong  MS: no gross musculoskeletal defects noted, no edema  PSYCH: mentation appears normal, affect normal/bright    Diagnostic Test Results:  none     ASSESSMENT/PLAN:     1. Acute bronchitis with symptoms > 10 days  On improving course over last day or two but protracted course- start antibiotics as below  Follow up if sx worsening, not improving.  - azithromycin (ZITHROMAX) 250 MG tablet; Two tablets first day, then one tablet daily for four days.  Dispense: 6 tablet; Refill: 0    Follow Up: The patient was instructed to contact clinic for worsening symptoms, non-improvement as expected/discussed, and for questions regarding medications or treatment plan. Discussed parameters for follow up and included in After Visit Summary given to patient.      Lyubov Almanza PA-C  [facility]  "

## 2019-05-03 NOTE — PATIENT INSTRUCTIONS
Start the zithromax as we discussed  2 tablets today,then 1 tab daily for the next 4 days    Ok to keep using the over the counter meds as needed - if not improved into early next week please follow up

## 2020-01-17 ENCOUNTER — TELEPHONE (OUTPATIENT)
Dept: FAMILY MEDICINE | Facility: CLINIC | Age: 70
End: 2020-01-17

## 2020-01-17 NOTE — PATIENT INSTRUCTIONS
Preventive Health Recommendations:     See your health care provider every year to    Review health changes.     Discuss preventive care.      Review your medicines if your doctor has prescribed any.      Talk with your health care provider about whether you should have a test to screen for prostate cancer (PSA).    Every 3 years, have a diabetes test (fasting glucose). If you are at risk for diabetes, you should have this test more often.    Every 5 years, have a cholesterol test. Have this test more often if you are at risk for high cholesterol or heart disease.     Every 10 years, have a colonoscopy. Or, have a yearly FIT test (stool test). These exams will check for colon cancer.    Talk to with your health care provider about screening for Abdominal Aortic Aneurysm if you have a family history of AAA or have a history of smoking.    Shots:     Get a flu shot each year.     Get a tetanus shot every 10 years.     Talk to your doctor about your pneumonia vaccines. There are now two you should receive - Pneumovax (PPSV 23) and Prevnar (PCV 13).     Talk to your pharmacist about a shingles vaccine.     Talk to your doctor about the hepatitis B vaccine.  Nutrition:     Eat at least 5 servings of fruits and vegetables each day.     Eat whole-grain bread, whole-wheat pasta and brown rice instead of white grains and rice.     Get adequate Calcium and Vitamin D.   Lifestyle    Exercise for at least 150 minutes a week (30 minutes a day, 5 days a week). This will help you control your weight and prevent disease.     Limit alcohol to one drink per day.     No smoking.     Wear sunscreen to prevent skin cancer.    See your dentist every six months for an exam and cleaning.    See your eye doctor every 1 to 2 years to screen for conditions such as glaucoma, macular degeneration, cataracts, etc.    Personalized Prevention Plan  You are due for the preventive services outlined below.  Your care team is available to assist you  in scheduling these services.  If you have already completed any of these items, please share that information with your care team to update in your medical record.  Health Maintenance Due   Topic Date Due     Zoster (Shingles) Vaccine (1 of 2) 04/03/2001     Annual Wellness Visit  12/03/2016     Cholesterol Lab  12/03/2016     Pneumococcal Vaccine (2 of 2 - PPSV23) 09/29/2017     FALL RISK ASSESSMENT  06/15/2018     Flu Vaccine (1) 09/01/2019     PHQ-2  01/01/2020       Plan for heart stress test    Call insurance about the CT lung cancer screening    pneumonia vaccine today  Patient Education   Personalized Prevention Plan  You are due for the preventive services outlined below.  Your care team is available to assist you in scheduling these services.  If you have already completed any of these items, please share that information with your care team to update in your medical record.  Health Maintenance Due   Topic Date Due     Zoster (Shingles) Vaccine (1 of 2) 04/03/2001     Annual Wellness Visit  12/03/2016     Cholesterol Lab  12/03/2016     Pneumococcal Vaccine (2 of 2 - PPSV23) 09/29/2017     FALL RISK ASSESSMENT  06/15/2018     Flu Vaccine (1) 09/01/2019     PHQ-2  01/01/2020

## 2020-01-17 NOTE — TELEPHONE ENCOUNTER
Panel Management Review      Patient has the following on his problem list: None      Composite cancer screening  Chart review shows that this patient is due/due soon for the following None  Summary:    Patient is due/failing the following:   LDL and PHYSICAL, Lung CT    Action needed:   Patient needs office visit for Physical and fasting labs.    Type of outreach:    Phone, spoke to patient.  Helped scheduled physical and CT scan    Questions for provider review:    Called to schedule the Chest CT and the current orders , patient is here for physical on 19. Please review and determine if patient needs new order placed.                                                                                                                                    Courtney Seo CMA       Chart routed to Care Team .

## 2020-01-23 ENCOUNTER — OFFICE VISIT (OUTPATIENT)
Dept: FAMILY MEDICINE | Facility: CLINIC | Age: 70
End: 2020-01-23
Payer: COMMERCIAL

## 2020-01-23 VITALS
BODY MASS INDEX: 32.27 KG/M2 | HEIGHT: 66 IN | TEMPERATURE: 97.8 F | HEART RATE: 77 BPM | RESPIRATION RATE: 14 BRPM | OXYGEN SATURATION: 95 % | WEIGHT: 200.8 LBS | SYSTOLIC BLOOD PRESSURE: 128 MMHG | DIASTOLIC BLOOD PRESSURE: 78 MMHG

## 2020-01-23 DIAGNOSIS — Z12.11 SPECIAL SCREENING FOR MALIGNANT NEOPLASMS, COLON: ICD-10-CM

## 2020-01-23 DIAGNOSIS — Z87.891 PERSONAL HISTORY OF TOBACCO USE: ICD-10-CM

## 2020-01-23 DIAGNOSIS — R07.89 ATYPICAL CHEST PAIN: ICD-10-CM

## 2020-01-23 DIAGNOSIS — Z00.00 ENCOUNTER FOR MEDICARE ANNUAL WELLNESS EXAM: ICD-10-CM

## 2020-01-23 DIAGNOSIS — Z23 NEED FOR PNEUMOCOCCAL VACCINE: ICD-10-CM

## 2020-01-23 DIAGNOSIS — Z13.1 SCREENING FOR DIABETES MELLITUS: ICD-10-CM

## 2020-01-23 DIAGNOSIS — Z12.5 SPECIAL SCREENING FOR MALIGNANT NEOPLASM OF PROSTATE: ICD-10-CM

## 2020-01-23 DIAGNOSIS — Z00.00 MEDICARE ANNUAL WELLNESS VISIT, SUBSEQUENT: Primary | ICD-10-CM

## 2020-01-23 DIAGNOSIS — Z13.220 SCREENING FOR HYPERLIPIDEMIA: ICD-10-CM

## 2020-01-23 LAB
ALT SERPL W P-5'-P-CCNC: 30 U/L (ref 0–70)
ANION GAP SERPL CALCULATED.3IONS-SCNC: 4 MMOL/L (ref 3–14)
AST SERPL W P-5'-P-CCNC: 20 U/L (ref 0–45)
BUN SERPL-MCNC: 16 MG/DL (ref 7–30)
CALCIUM SERPL-MCNC: 9.5 MG/DL (ref 8.5–10.1)
CHLORIDE SERPL-SCNC: 108 MMOL/L (ref 94–109)
CHOLEST SERPL-MCNC: 172 MG/DL
CO2 SERPL-SCNC: 27 MMOL/L (ref 20–32)
CREAT SERPL-MCNC: 0.93 MG/DL (ref 0.66–1.25)
ERYTHROCYTE [DISTWIDTH] IN BLOOD BY AUTOMATED COUNT: 13.8 % (ref 10–15)
GFR SERPL CREATININE-BSD FRML MDRD: 84 ML/MIN/{1.73_M2}
GLUCOSE SERPL-MCNC: 104 MG/DL (ref 70–99)
HBA1C MFR BLD: 5.6 % (ref 0–5.6)
HCT VFR BLD AUTO: 46 % (ref 40–53)
HDLC SERPL-MCNC: 51 MG/DL
HGB BLD-MCNC: 15.2 G/DL (ref 13.3–17.7)
LDLC SERPL CALC-MCNC: 101 MG/DL
MCH RBC QN AUTO: 29.7 PG (ref 26.5–33)
MCHC RBC AUTO-ENTMCNC: 33 G/DL (ref 31.5–36.5)
MCV RBC AUTO: 90 FL (ref 78–100)
NONHDLC SERPL-MCNC: 121 MG/DL
PLATELET # BLD AUTO: 258 10E9/L (ref 150–450)
POTASSIUM SERPL-SCNC: 4.4 MMOL/L (ref 3.4–5.3)
PSA SERPL-ACNC: 2.96 UG/L (ref 0–4)
RBC # BLD AUTO: 5.11 10E12/L (ref 4.4–5.9)
SODIUM SERPL-SCNC: 139 MMOL/L (ref 133–144)
TRIGL SERPL-MCNC: 98 MG/DL
WBC # BLD AUTO: 7.4 10E9/L (ref 4–11)

## 2020-01-23 PROCEDURE — 90471 IMMUNIZATION ADMIN: CPT | Performed by: PHYSICIAN ASSISTANT

## 2020-01-23 PROCEDURE — 80061 LIPID PANEL: CPT | Performed by: PHYSICIAN ASSISTANT

## 2020-01-23 PROCEDURE — 99214 OFFICE O/P EST MOD 30 MIN: CPT | Mod: 25 | Performed by: PHYSICIAN ASSISTANT

## 2020-01-23 PROCEDURE — 90732 PPSV23 VACC 2 YRS+ SUBQ/IM: CPT | Performed by: PHYSICIAN ASSISTANT

## 2020-01-23 PROCEDURE — 84460 ALANINE AMINO (ALT) (SGPT): CPT | Performed by: PHYSICIAN ASSISTANT

## 2020-01-23 PROCEDURE — 84450 TRANSFERASE (AST) (SGOT): CPT | Performed by: PHYSICIAN ASSISTANT

## 2020-01-23 PROCEDURE — 85027 COMPLETE CBC AUTOMATED: CPT | Performed by: PHYSICIAN ASSISTANT

## 2020-01-23 PROCEDURE — 83036 HEMOGLOBIN GLYCOSYLATED A1C: CPT | Performed by: PHYSICIAN ASSISTANT

## 2020-01-23 PROCEDURE — G0296 VISIT TO DETERM LDCT ELIG: HCPCS | Performed by: PHYSICIAN ASSISTANT

## 2020-01-23 PROCEDURE — 99397 PER PM REEVAL EST PAT 65+ YR: CPT | Mod: 25 | Performed by: PHYSICIAN ASSISTANT

## 2020-01-23 PROCEDURE — 36415 COLL VENOUS BLD VENIPUNCTURE: CPT | Performed by: PHYSICIAN ASSISTANT

## 2020-01-23 PROCEDURE — 80048 BASIC METABOLIC PNL TOTAL CA: CPT | Performed by: PHYSICIAN ASSISTANT

## 2020-01-23 PROCEDURE — G0103 PSA SCREENING: HCPCS | Performed by: PHYSICIAN ASSISTANT

## 2020-01-23 ASSESSMENT — ENCOUNTER SYMPTOMS
PALPITATIONS: 0
WEAKNESS: 0
CONSTIPATION: 0
JOINT SWELLING: 0
MYALGIAS: 0
SHORTNESS OF BREATH: 1
PARESTHESIAS: 0
ABDOMINAL PAIN: 0
HEARTBURN: 0
HEMATURIA: 0
NAUSEA: 0
DIARRHEA: 0
HEMATOCHEZIA: 0
SORE THROAT: 0
FEVER: 0
ARTHRALGIAS: 1
NERVOUS/ANXIOUS: 0
HEADACHES: 0
DIZZINESS: 0
COUGH: 0
CHILLS: 0
DYSURIA: 0
EYE PAIN: 0
FREQUENCY: 1

## 2020-01-23 ASSESSMENT — ACTIVITIES OF DAILY LIVING (ADL): CURRENT_FUNCTION: NO ASSISTANCE NEEDED

## 2020-01-23 ASSESSMENT — MIFFLIN-ST. JEOR: SCORE: 1619.57

## 2020-01-23 NOTE — LETTER
"January 27, 2020      Aquiles Mckeon  [address] [address]        Dear ,    We are writing to inform you of your test results.    Your test results fall within the expected range(s) or remain unchanged from previous results.  Please continue with current treatment plan.    PSA- prostate cancer screening is in the normal range and stable.     Cholesterol is at goal and normal.   Interpreting your Cholesterol Profile Results: The LDL is the \"bad\" cholesterol that can clog the arteries.  The HDL is protective or \"good cholesterol\"; exercise, a \"mediterranean diet\" and weight management can boost this level. Triglycerides are the sugary-fats in the blood; a diet high in carbohydrates (bread, pasta, rice, chips, sweets) and alcohol can raise this level. The ratio of HDL to total cholesterol under 5.0 is optimal.      Glucose- this is the screen for diabetes. It is just above normal at 104 in the prediabetes range.   Normal when fasting is under 100.   Your a1c test- is normal.  A carbohydrate controlled diet, regular exercise, and a weight management are strongly encouraged. These lifestyle habits are known to greatly improve sugars or delay/prevent progression to diabetes.    This should be monitored every 6-12 mo.     Diabetes Screening Labs:  Fasting blood sugar or glucose:  Normal: less than 100  Prediabetes range: 100-125  Diabetes range: >/=126    Hemoglobin a1c: a measure of blood sugar control over the past 3 months:  Normal range: 4.0-5.6%  Prediabetes range: 5.7-6.4%  Diabetes range: 6.5% and higher          Kidney and liver tests are normal.     Complete blood counts are normal.     Resulted Orders   Lipid panel reflex to direct LDL Fasting   Result Value Ref Range    Cholesterol 172 <200 mg/dL    Triglycerides 98 <150 mg/dL    HDL Cholesterol 51 >39 mg/dL    LDL Cholesterol Calculated 101 (H) <100 mg/dL      Comment:      Above desirable:  100-129 mg/dl  Borderline High:  130-159 " mg/dL  High:             160-189 mg/dL  Very high:       >189 mg/dl      Non HDL Cholesterol 121 <130 mg/dL   Basic metabolic panel  (Ca, Cl, CO2, Creat, Gluc, K, Na, BUN)   Result Value Ref Range    Sodium 139 133 - 144 mmol/L    Potassium 4.4 3.4 - 5.3 mmol/L    Chloride 108 94 - 109 mmol/L    Carbon Dioxide 27 20 - 32 mmol/L    Anion Gap 4 3 - 14 mmol/L    Glucose 104 (H) 70 - 99 mg/dL    Urea Nitrogen 16 7 - 30 mg/dL    Creatinine 0.93 0.66 - 1.25 mg/dL    GFR Estimate 84 >60 mL/min/[1.73_m2]      Comment:      Non  GFR Calc  Starting 12/18/2018, serum creatinine based estimated GFR (eGFR) will be   calculated using the Chronic Kidney Disease Epidemiology Collaboration   (CKD-EPI) equation.      GFR Estimate If Black >90 >60 mL/min/[1.73_m2]      Comment:       GFR Calc  Starting 12/18/2018, serum creatinine based estimated GFR (eGFR) will be   calculated using the Chronic Kidney Disease Epidemiology Collaboration   (CKD-EPI) equation.      Calcium 9.5 8.5 - 10.1 mg/dL   ALT   Result Value Ref Range    ALT 30 0 - 70 U/L   AST   Result Value Ref Range    AST 20 0 - 45 U/L   Hemoglobin A1c   Result Value Ref Range    Hemoglobin A1C 5.6 0 - 5.6 %      Comment:      Normal <5.7% Prediabetes 5.7-6.4%  Diabetes 6.5% or higher - adopted from ADA   consensus guidelines.     CBC with platelets   Result Value Ref Range    WBC 7.4 4.0 - 11.0 10e9/L    RBC Count 5.11 4.4 - 5.9 10e12/L    Hemoglobin 15.2 13.3 - 17.7 g/dL    Hematocrit 46.0 40.0 - 53.0 %    MCV 90 78 - 100 fl    MCH 29.7 26.5 - 33.0 pg    MCHC 33.0 31.5 - 36.5 g/dL    RDW 13.8 10.0 - 15.0 %    Platelet Count 258 150 - 450 10e9/L   Prostate spec antigen screen   Result Value Ref Range    PSA 2.96 0 - 4 ug/L      Comment:      Assay Method:  Chemiluminescence using Siemens Vista analyzer       If you have any questions or concerns, please call the clinic at the number listed above.       Sincerely,        Lyubov Almanza,  JACQUES

## 2020-01-23 NOTE — NURSING NOTE
Prior to immunization administration, verified patients identity using patient s name and date of birth. Please see Immunization Activity for additional information.     Screening Questionnaire for Adult Immunization    Are you sick today?   No   Do you have allergies to medications, food, a vaccine component or latex?   No   Have you ever had a serious reaction after receiving a vaccination?   No   Do you have a long-term health problem with heart, lung, kidney, or metabolic disease (e.g., diabetes), asthma, a blood disorder, no spleen, complement component deficiency, a cochlear implant, or a spinal fluid leak?  Are you on long-term aspirin therapy?   No   Do you have cancer, leukemia, HIV/AIDS, or any other immune system problem?   No   Do you have a parent, brother, or sister with an immune system problem?   No   In the past 3 months, have you taken medications that affect  your immune system, such as prednisone, other steroids, or anticancer drugs; drugs for the treatment of rheumatoid arthritis, Crohn s disease, or psoriasis; or have you had radiation treatments?   No   Have you had a seizure, or a brain or other nervous system problem?   No   During the past year, have you received a transfusion of blood or blood    products, or been given immune (gamma) globulin or antiviral drug?   No   For women: Are you pregnant or is there a chance you could become       pregnant during the next month?   No   Have you received any vaccinations in the past 4 weeks?   No     Immunization questionnaire answers were all negative.      Patient instructed to remain in clinic for 15 minutes afterwards, and to report any adverse reaction to me immediately.       Screening performed by Courtney Seo MA on 1/23/2020 at 8:45 AM.

## 2020-01-23 NOTE — PROGRESS NOTES
"SUBJECTIVE:   Aquiles Mckeon is a 69 year old male who presents for Preventive Visit.    Are you in the first 12 months of your Medicare coverage?  No    Healthy Habits:     In general, how would you rate your overall health?  Good    Frequency of exercise:  2-3 days/week    Duration of exercise:  45-60 minutes    Do you usually eat at least 4 servings of fruit and vegetables a day, include whole grains    & fiber and avoid regularly eating high fat or \"junk\" foods?  Yes    Taking medications regularly:  Not Applicable    Medication side effects:  Not applicable    Ability to successfully perform activities of daily living:  No assistance needed    Home Safety:  No safety concerns identified    Hearing Impairment:  No hearing concerns    In the past 6 months, have you been bothered by leaking of urine?  No    In general, how would you rate your overall mental or emotional health?  Excellent      PHQ-2 Total Score: 0    Additional concerns today:  No    Do you feel safe in your environment? Yes    Have you ever done Advance Care Planning? (For example, a Health Directive, POLST, or a discussion with a medical provider or your loved ones about your wishes): No, advance care planning information given to patient to review.  Patient declined advance care planning discussion at this time.      Fall risk  Fallen 2 or more times in the past year?: (P) No  Any fall with injury in the past year?: (P) No    Cognitive Screening   1) Repeat 3 items (Leader, Season, Table)    2) Clock draw: NORMAL  3) 3 item recall: Recalls 2 objects   Results: NORMAL clock, 1-2 items recalled: COGNITIVE IMPAIRMENT LESS LIKELY    Mini-CogTM Copyright BERNY Cheema. Licensed by the author for use in [facility]; reprinted with permission (sadaf@.[facility]). All rights reserved.      Do you have sleep apnea, excessive snoring or daytime drowsiness?: no     Routine Health Maintenance:  Fasting: yes (ate a sugar free candy on his night " stand)  Immunizations: due for pneumovax , shingles, and flu. He declines flu.   Colonoscopy: last 2016; ; due in . Fam hx of colon cancer: no  PSA screening: Last: .  Fam hx of prostate cancer: no  Urinary concerns: I go a lot, but I drink a lot of water. Yeti 22 oz during day and on nightstand at night. Some nights up 3x. If not drinking as much water sleeps through the night.    For exercise: walking dogs 1.5mile track 3-4x daily in winter- daily in summer, gets brisk pace; feels good with exercise. Limitations with exercise due to: nothing. Denies CV sxs with exercise including CP, SOB, palpitations, OLIVO, presyncope.    Chest pain  Started in the fall. Will get chest pain under both arms with exertion. Lasts 10min or so. He wondered if it was because he dug a trench this summer for an above ground pool. But it continued to happens months after her dug the trench sporadically. Sometimes he is SOB with climbing his stairs. He attributes to being overweight. He can walk the dogs without OLIVO or CP.   Former 30+ pack year smoker (40 yrs 3/4-1 pack per day).  He does not have a diagnosis of HTN or diabetes.  Father had CHF   He takes a full strength aspirin daily for joint pains in hands    CT lung cancer screening  Ordered last year. He did not do it. He is wondering about cost. If covered by insurance he would do it.     Reviewed and updated as needed this visit by clinical staff  Tobacco  Meds  Med Hx  Surg Hx  Fam Hx  Soc Hx        Reviewed and updated as needed this visit by Provider        Social History     Tobacco Use     Smoking status: Former Smoker     Packs/day: 0.80     Years: 40.00     Pack years: 32.00     Types: Cigarettes     Last attempt to quit: 2015     Years since quittin.7     Smokeless tobacco: Never Used     Tobacco comment: quit smoking 2015   Substance Use Topics     Alcohol use: Yes     Alcohol/week: 11.7 standard drinks     Types: 14 Standard drinks  or equivalent per week     Comment: Beer mainly-5-6 per week     If you drink alcohol do you typically have >3 drinks per day or >7 drinks per week? No    Alcohol Use 1/23/2020   Prescreen: >3 drinks/day or >7 drinks/week? No   Prescreen: >3 drinks/day or >7 drinks/week? -   No flowsheet data found.      Current providers sharing in care for this patient include:   Patient Care Team:  Lyubov Almanza PA-C as PCP - General (Physician Assistant - Medical)  Lyubov Almanza PA-C as Assigned PCP    The following health maintenance items are reviewed in Epic and correct as of today:  Health Maintenance   Topic Date Due     ZOSTER IMMUNIZATION (1 of 2) 04/03/2001     MEDICARE ANNUAL WELLNESS VISIT  12/03/2016     LIPID  12/03/2016     PNEUMOCOCCAL IMMUNIZATION 65+ LOW/MEDIUM RISK (2 of 2 - PPSV23) 09/29/2017     FALL RISK ASSESSMENT  06/15/2018     INFLUENZA VACCINE (1) 09/01/2019     PHQ-2  01/01/2020     ADVANCE CARE PLANNING  12/03/2020     COLONOSCOPY  01/29/2021     DTAP/TDAP/TD IMMUNIZATION (2 - Td) 12/03/2025     AORTIC ANEURYSM SCREENING (SYSTEM ASSIGNED)  Completed     HEPATITIS C SCREENING  Addressed     IPV IMMUNIZATION  Aged Out     MENINGITIS IMMUNIZATION  Aged Out     Lab work is in process  Labs reviewed in EPIC  BP Readings from Last 3 Encounters:   01/23/20 128/78   05/03/19 120/76   01/07/19 120/78    Wt Readings from Last 3 Encounters:   01/23/20 91.1 kg (200 lb 12.8 oz)   05/03/19 90.7 kg (200 lb)   01/07/19 90.8 kg (200 lb 1.6 oz)                  Patient Active Problem List   Diagnosis     Hyperlipidemia with target LDL less than 160     History of colonic polyps     Erectile dysfunction, unspecified erectile dysfunction type     Advanced directives, counseling/discussion     History of tobacco use     Past Surgical History:   Procedure Laterality Date     COLONOSCOPY  2001    Reported as having a polyp removed     COLONOSCOPY  2004    Clear by report     COLONOSCOPY N/A 1/29/2016     Procedure: COLONOSCOPY;  Surgeon: Juanjo Martínez MD;  Location: [facility] OR     COLONOSCOPY WITH CO2 INSUFFLATION N/A 2016    Procedure: COLONOSCOPY WITH CO2 INSUFFLATION;  Surgeon: Juanjo Martínez MD;  Location: [facility] OR       Social History     Tobacco Use     Smoking status: Former Smoker     Packs/day: 0.80     Years: 40.00     Pack years: 32.00     Types: Cigarettes     Last attempt to quit: 2015     Years since quittin.7     Smokeless tobacco: Never Used     Tobacco comment: quit smoking 2015   Substance Use Topics     Alcohol use: Yes     Alcohol/week: 11.7 standard drinks     Types: 14 Standard drinks or equivalent per week     Comment: Beer mainly-5-6 per week     Family History   Problem Relation Age of Onset     Heart Disease Father         CHF     Emphysema Father      Cervical Cancer Mother      Lung Cancer Mother      Diabetes No family hx of      Hypertension No family hx of      Breast Cancer No family hx of      Prostate Cancer No family hx of      Mental Illness No family hx of      Osteoporosis No family hx of      Obesity No family hx of          Current Outpatient Medications   Medication Sig Dispense Refill     aspirin 325 MG tablet Take 1.5 tablets (487.5 mg) by mouth daily 120 tablet      MAGNESIUM PO        Triprolidine-Pseudoephedrine (ANTIHISTAMINE PO) Take 1 tablet daily for itching.       meloxicam (MOBIC) 15 MG tablet TAKE 1 TABLET BY MOUTH EVERY DAY (Patient not taking: Reported on 2020) 20 tablet 0     methocarbamol (ROBAXIN) 750 MG tablet Take 1 tablet (750 mg) by mouth 3 times daily (Patient not taking: Reported on 2020) 30 tablet 0     Allergies   Allergen Reactions     Ampicillin      Pneumonia Vaccine:he had PCV13     Review of Systems   Constitutional: Negative for chills and fever.   HENT: Positive for hearing loss. Negative for congestion, ear pain and sore throat.    Eyes: Negative for pain and visual disturbance.   Respiratory:  "Positive for shortness of breath. Negative for cough.    Cardiovascular: Negative for chest pain, palpitations and peripheral edema.   Gastrointestinal: Negative for abdominal pain, constipation, diarrhea, heartburn, hematochezia and nausea.   Genitourinary: Positive for frequency and urgency. Negative for discharge, dysuria, genital sores, hematuria and impotence.   Musculoskeletal: Positive for arthralgias. Negative for joint swelling and myalgias.   Skin: Negative for rash.   Neurological: Negative for dizziness, weakness, headaches and paresthesias.   Psychiatric/Behavioral: Negative for mood changes. The patient is not nervous/anxious.        OBJECTIVE:   /78   Pulse 77   Temp 97.8  F (36.6  C) (Temporal)   Resp 14   Ht 1.67 m (5' 5.75\")   Wt 91.1 kg (200 lb 12.8 oz)   SpO2 95%   BMI 32.66 kg/m   Estimated body mass index is 32.65 kg/m  as calculated from the following:    Height as of 5/3/19: 1.667 m (5' 5.63\").    Weight as of 5/3/19: 90.7 kg (200 lb).  Physical Exam  GENERAL: healthy, alert and no distress  EYES: Eyes grossly normal to inspection, PERRL and conjunctivae and sclerae normal  HENT: ear canals and TM's normal, nose and mouth without ulcers or lesions  NECK: no adenopathy, no asymmetry, masses, or scars and thyroid normal to palpation, no bruits  RESP: lungs clear to auscultation - no rales, rhonchi or wheezes  CV: regular rate and rhythm, normal S1 S2, no S3 or S4, no murmur, click or rub, no peripheral edema and peripheral pulses strong  ABDOMEN: soft, nontender, no hepatosplenomegaly, no masses and bowel sounds normal   (male): pt declined  RECTAL: pt declined  MS: no gross musculoskeletal defects noted, no edema  SKIN: no suspicious lesions or rashes  NEURO: Normal strength and tone, mentation intact and speech normal  PSYCH: mentation appears normal, affect normal/bright  LYMPH: normal ant/post cervical, supraclavicular nodes    Diagnostic Test Results:  Labs reviewed in " Epic  Results for orders placed or performed in visit on 01/23/20 (from the past 24 hour(s))   Hemoglobin A1c   Result Value Ref Range    Hemoglobin A1C 5.6 0 - 5.6 %   CBC with platelets   Result Value Ref Range    WBC 7.4 4.0 - 11.0 10e9/L    RBC Count 5.11 4.4 - 5.9 10e12/L    Hemoglobin 15.2 13.3 - 17.7 g/dL    Hematocrit 46.0 40.0 - 53.0 %    MCV 90 78 - 100 fl    MCH 29.7 26.5 - 33.0 pg    MCHC 33.0 31.5 - 36.5 g/dL    RDW 13.8 10.0 - 15.0 %    Platelet Count 258 150 - 450 10e9/L       ASSESSMENT / PLAN:   1. Medicare annual wellness visit, subsequent  Fasting labs   Pneumovax 23 given  Will be due for colonoscopy next year (5 yr f/u)  Counseling as below   - Lipid panel reflex to direct LDL Fasting  - VACCINE ADMINISTRATION, INITIAL  - Pneumococcal vaccine 23 valent PPSV23  (Pneumovax) [[id number]]  - Basic metabolic panel  (Ca, Cl, CO2, Creat, Gluc, K, Na, BUN)  - Hemoglobin A1c  - CBC with platelets    2. Atypical chest pain  Chest pain w/exertion, atypical location (bilat underarms)   Stress echo  Screen lipids   Discussed RF, RF reduction, s/sx for ER or emergent care   - Lipid panel reflex to direct LDL Fasting  - Echocardiogram Exercise Stress; Future    3. Personal history of tobacco use  Counseled on lung cancer screening. He would like to do screening if it is covered by his insurance.   order placed, he will schedule on his own if desired.  - Prof Fee: Shared Decision Making Visit for Lung Cancer Screening  - CT Chest Lung Cancer Scrn Low Dose wo; Future    4. Special screening for malignant neoplasm of prostate  Counseled on screening recommendations. Pt would like to do blood test and he declined the exam   - Prostate spec antigen screen    5. Special screening for malignant neoplasms, colon  He is UTD, due next year in 2021     6. Screening for diabetes mellitus  Obtained labs as below   - Basic metabolic panel  (Ca, Cl, CO2, Creat, Gluc, K, Na, BUN)  - Hemoglobin A1c    7. Screening for  "hyperlipidemia  Obtained   - Lipid panel reflex to direct LDL Fasting  - ALT  - AST    8. Need for pneumococcal vaccine  Given   - VACCINE ADMINISTRATION, INITIAL  - Pneumococcal vaccine 23 valent PPSV23  (Pneumovax) [[id number]]    COUNSELING:  Reviewed preventive health counseling, as reflected in patient instructions       Regular exercise       Healthy diet/nutrition       Vision screening       Hearing screening       Bladder control       Fall risk prevention       Immunizations       Consider lung cancer screening for ages 55-80 years and 30 pack-year smoking history        Colon cancer screening       Prostate cancer screening    Estimated body mass index is 32.66 kg/m  as calculated from the following:    Height as of this encounter: 1.67 m (5' 5.75\").    Weight as of this encounter: 91.1 kg (200 lb 12.8 oz).    Weight management plan: Discussed healthy diet and exercise guidelines     reports that he quit smoking about 4 years ago. His smoking use included cigarettes. He has a 32.00 pack-year smoking history. He has never used smokeless tobacco.      Appropriate preventive services were discussed with this patient, including applicable screening as appropriate for cardiovascular disease, diabetes, osteopenia/osteoporosis, and glaucoma.  As appropriate for age/gender, discussed screening for colorectal cancer, prostate cancer, breast cancer, and cervical cancer. Checklist reviewing preventive services available has been given to the patient.    Reviewed patients plan of care and provided an AVS. The Intermediate Care Plan ( asthma action plan, low back pain action plan, and migraine action plan) for Aquiles meets the Care Plan requirement. This Care Plan has been established and reviewed with the Patient.    Counseling Resources:  ATP IV Guidelines  Pooled Cohorts Equation Calculator  Breast Cancer Risk Calculator  FRAX Risk Assessment  ICSI Preventive Guidelines  Dietary Guidelines for Americans, 2010  USDA's " MyPlate  ASA Prophylaxis  Lung CA Screening    Lyubov Almanza PA-C  [facility]    Identified Health Risks:

## 2020-01-27 PROBLEM — R73.01 IMPAIRED FASTING GLUCOSE: Status: ACTIVE | Noted: 2020-01-27

## 2020-04-17 ENCOUNTER — TELEPHONE (OUTPATIENT)
Dept: FAMILY MEDICINE | Facility: CLINIC | Age: 70
End: 2020-04-17

## 2020-04-17 NOTE — TELEPHONE ENCOUNTER
Reason for call:  Form  Reason for Call:  Form, our goal is to have forms completed with 72 hours, however, some forms may require a visit or additional information.    Type of letter, form or note:  Medical    Who is the form from?: [employer]      Where did the form come from: form was faxed in    What clinic location was the form placed at?: [facility] - [phone]    Where the form was placed: 's in box     What number is listed as a contact on the form?: [phone]       Additional comments: Fax back to [phone]    Call taken on 4/17/2020 at 4:53 PM by Minh Arellano

## 2020-04-21 NOTE — TELEPHONE ENCOUNTER
Form completed and placed in  inbox.    Tim Petit MD, FAAFP  Family Medicine Physician  [facility] [place]  [address] [place] [address]

## 2020-12-20 ENCOUNTER — HEALTH MAINTENANCE LETTER (OUTPATIENT)
Age: 70
End: 2020-12-20

## 2021-02-28 ENCOUNTER — HEALTH MAINTENANCE LETTER (OUTPATIENT)
Age: 71
End: 2021-02-28

## 2021-03-04 ENCOUNTER — TELEPHONE (OUTPATIENT)
Dept: FAMILY MEDICINE | Facility: CLINIC | Age: 71
End: 2021-03-04

## 2021-03-04 NOTE — TELEPHONE ENCOUNTER
Our goal is to have forms completed with 72 hours, however some forms may require a visit or additional information.    Who is the form from?: [employer]  (if other please explain)  Where the form came from: form was faxed in  What clinic location was the form placed at?: [place]  Where the form was placed: placed in TC inbox     What number is listed as a contact on the form?: [phone]  Fax number to return form to:  [phone]   (Patient dont want originals back)         Call taken on 3/4/2021 at 1:11 PM by Freda Hutchinson

## 2021-03-04 NOTE — TELEPHONE ENCOUNTER
Form completed to the best of my ability. Provider please review completed answers and answer highlighted questions then sign. Form placed in providers bin in her office.    Elizabeth Crews CMA (AAMA)

## 2021-03-08 NOTE — PATIENT INSTRUCTIONS
Preventive Health Recommendations:     See your health care provider every year to    Review health changes.     Discuss preventive care.      Review your medicines if your doctor has prescribed any.      Talk with your health care provider about whether you should have a test to screen for prostate cancer (PSA).    Every 3 years, have a diabetes test (fasting glucose). If you are at risk for diabetes, you should have this test more often.    Every 5 years, have a cholesterol test. Have this test more often if you are at risk for high cholesterol or heart disease.     Every 10 years, have a colonoscopy. Or, have a yearly FIT test (stool test). These exams will check for colon cancer.    Talk to with your health care provider about screening for Abdominal Aortic Aneurysm if you have a family history of AAA or have a history of smoking.    Shots:     Get a flu shot each year.     Get a tetanus shot every 10 years.     Talk to your doctor about your pneumonia vaccines. There are now two you should receive - Pneumovax (PPSV 23) and Prevnar (PCV 13).     Talk to your pharmacist about a shingles vaccine.     Talk to your doctor about the hepatitis B vaccine.  Nutrition:     Eat at least 5 servings of fruits and vegetables each day.     Eat whole-grain bread, whole-wheat pasta and brown rice instead of white grains and rice.     Get adequate Calcium and Vitamin D.   Lifestyle    Exercise for at least 150 minutes a week (30 minutes a day, 5 days a week). This will help you control your weight and prevent disease.     Limit alcohol to one drink per day.     No smoking.     Wear sunscreen to prevent skin cancer.    See your dentist every six months for an exam and cleaning.    See your eye doctor every 1 to 2 years to screen for conditions such as glaucoma, macular degeneration, cataracts, etc.    Personalized Prevention Plan  You are due for the preventive services outlined below.  Your care team is available to assist you  in scheduling these services.  If you have already completed any of these items, please share that information with your care team to update in your medical record.  Health Maintenance Due   Topic Date Due     Zoster (Shingles) Vaccine (1 of 2) Never done     Flu Vaccine (1) 09/01/2020     PHQ-2  01/01/2021     Annual Wellness Visit  01/23/2021     Cholesterol Lab  01/23/2021     FALL RISK ASSESSMENT  01/23/2021     Colorectal Cancer Screening  01/29/2021     Plan for Shingrix vaccine. But you should wait until 2-3 weeks after the 2nd covid shot. Can do it at your pharmacy or here in clinic.   Plan to see - podiatry for the foot.   Careful with NSAIDs. Taking too much ibuprofen. STOP ibuprofen. Will try alternative NSAID- diclofenac.  Start pepcid twice daily- to protect stomach.  NSAIDs alleviate pain and treat inflammation.   You are being prescribed an NSAID. Do not take any other over the counter NSAIDs or prescription NSAIDs when on this medication (examples include Advil, Aleve, Motrin, ibuprofen, diclofenac, meloxicam, indomethacin).  Take with food. Stop taking if you get an upset stomach.   Hydrate well when you take this medication.    Do not take extra iron.          Patient Education   Personalized Prevention Plan  You are due for the preventive services outlined below.  Your care team is available to assist you in scheduling these services.  If you have already completed any of these items, please share that information with your care team to update in your medical record.  Health Maintenance Due   Topic Date Due     Zoster (Shingles) Vaccine (1 of 2) Never done     Flu Vaccine (1) 09/01/2020     Colorectal Cancer Screening  01/29/2021       Exercise for a Healthier Heart     Exercise with a friend. When activity is fun, you're more likely to stick with it.   You may wonder how you can improve the health of your heart. If you re thinking about exercise, you re on the right track. You don t need  to become an athlete. But you do need a certain amount of brisk exercise to help strengthen your heart. If you have been diagnosed with a heart condition, your healthcare provider may advise exercise to help stabilize your condition. To help make exercise a habit, choose safe, fun activities.   Before you start  Check with your healthcare provider before starting an exercise program. This is especially important if you have not been active for a while. It's also important if you have a long-term (chronic) health problem such as heart disease, diabetes, or obesity. Or if you are at high risk for having these problems.   Why exercise?  Exercising regularly offers many healthy rewards. It can help you do all of the following:    Improve your blood cholesterol level to help prevent further heart trouble    Lower your blood pressure to help prevent a stroke or heart attack    Control diabetes, or reduce your risk of getting this disease    Improve your heart and lung function    Reach and stay at a healthy weight    Make your muscles stronger so you can stay active    Prevent falls and fractures by slowing the loss of bone mass (osteoporosis)    Manage stress better    Reduce your blood pressure    Improve your sense of self and your body image  Exercise tips      Ease into your routine. Set small goals. Then build on them. If you are not sure what your activity level should be, talk with your healthcare provider first before starting an exercise routine.    Exercise on most days. Aim for a total of 150 minutes (2 hours and 30 minutes) or more of moderate-intensity aerobic activity each week. Or 75 minutes (1 hour and 15 minutes) or more of vigorous-intensity aerobic activity each week. Or try for a combination of both. Moderate activity means that you breathe heavier and your heart rate increases but you can still talk. Think about doing 40 minutes of moderate exercise, 3 to 4 times a week. For best results, activity  should last for about 40 minutes to lower blood pressure and cholesterol. It's OK to work up to the 40-minute period over time. Examples of moderate-intensity activity are walking 1 mile in 15 minutes. Or doing 30 to 45 minutes of yard work.    Step up your daily activity level.  Along with your exercise program, try being more active the whole day. Walk instead of drive. Or park further away so that you take more steps each day. Do more household tasks or yard work. You may not be able to meet the advised mount of physical activity. But doing some moderate- or vigorous-intensity aerobic activity can help reduce your risk for heart disease. Your healthcare provider can help you figure out what is best for you.    Choose 1 or more activities you enjoy.  Walking is one of the easiest things you can do. You can also try swimming, riding a bike, dancing, or taking an exercise class.    When to call your healthcare provider  Call your healthcare provider if you have any of these:     Chest pain or feel dizzy or lightheaded    Burning, tightness, pressure, or heaviness in your chest, neck, shoulders, back, or arms    Abnormal shortness of breath    More joint or muscle pain    A very fast or irregular heartbeat (palpitations)  [employer] last reviewed this educational content on 7/1/2019 2000-2020 [employer]. All rights reserved. This information is not intended as a substitute for professional medical care. Always follow your healthcare professional's instructions.

## 2021-03-08 NOTE — TELEPHONE ENCOUNTER
Please call pt- His form is complete and ready to be faxed.  Also his last Medicare visit was more than 1 year ago - 01/23/2020. He is due for annual visit. Assist w/scheduling.   Lyubov Almanza PA-C

## 2021-03-08 NOTE — TELEPHONE ENCOUNTER
Spoke with patient, scheduled  Next 5 appointments (look out 90 days)    Mar 11, 2021 11:20 AM  PHYSICAL with Lyubov Almanza PA-C  [facility] [place] ([facility] [place] ) [address], [address]  [place] [address] [phone]        Form has been faxed and scanned  Closing encounter  Roxana Anthony RT (R)

## 2021-03-08 NOTE — PROGRESS NOTES
"SUBJECTIVE:   Aquiles Mckeon is a 70 year old male who presents for Preventive Visit.      Patient has been advised of split billing requirements and indicates understanding: Yes   Are you in the first 12 months of your Medicare coverage?  No    Healthy Habits:     In general, how would you rate your overall health?  Excellent    Frequency of exercise:  None (since his foot has started bothering him)    Do you usually eat at least 4 servings of fruit and vegetables a day, include whole grains    & fiber and avoid regularly eating high fat or \"junk\" foods?  Yes    Taking medications regularly:  Yes    Barriers to taking medications:  None    Medication side effects:  None    Ability to successfully perform activities of daily living:  No assistance needed    Home Safety:  No safety concerns identified    Hearing Impairment:  No hearing concerns    In the past 6 months, have you been bothered by leaking of urine?  No    In general, how would you rate your overall mental or emotional health?  Excellent      PHQ-2 Total Score: 0    Additional concerns today:  Yes (Foot pain)    Do you feel safe in your environment? Yes    Have you ever done Advance Care Planning? (For example, a Health Directive, POLST, or a discussion with a medical provider or your loved ones about your wishes): No given to pt      Fall risk  Fallen 2 or more times in the past year?: (P) No  Any fall with injury in the past year?: (P) No      Cognitive Screening   1) Repeat 3 items (Leader, Season, Table)    2) Clock draw: NORMAL  3) 3 item recall: Recalls 3 objects  Results: 3 items recalled: COGNITIVE IMPAIRMENT LESS LIKELY    Mini-CogTM Copyright BERNY Cheema. Licensed by the author for use in [facility]; reprinted with permission (sadaf@.[email or web address]). All rights reserved.      Do you have sleep apnea, excessive snoring or daytime drowsiness?: no    Reviewed and updated as needed this visit by clinical staff  Tobacco  Allergies  Meds   Med " Hx  Surg Hx  Fam Hx  Soc Hx        Reviewed and updated as needed this visit by Provider                Social History     Tobacco Use     Smoking status: Former Smoker     Packs/day: 0.80     Years: 40.00     Pack years: 32.00     Types: Cigarettes     Quit date: 2015     Years since quittin.8     Smokeless tobacco: Never Used     Tobacco comment: quit smoking 2015   Substance Use Topics     Alcohol use: Yes     Alcohol/week: 11.7 standard drinks     Types: 14 Standard drinks or equivalent per week     Comment: Beer mainly- 5-6 per week     If you drink alcohol do you typically have >3 drinks per day or >7 drinks per week? No    Alcohol Use 3/10/2021   Prescreen: >3 drinks/day or >7 drinks/week? No   Prescreen: >3 drinks/day or >7 drinks/week? -       Routine Health Maintenance:  Fasting: yes  Immunizations: He had his covid vaccine Pfizer - 2021  Colonoscopy: 2016. F/U in 5 yrs- due in .   PSA screening: Last: 2020- normal. Fam hx of prostate cancer: no  Urinary concerns: none      Left foot- heel and arch pain- Plantar fasciitis -  Left heel pain - started early Dec 2020 (4mo ago)   No injury or new activity.   Abrupt onset.   Has been treating through DMR with his chiropractor, did PT (referred from chiro). Then they referred him to [facility]- they gave him cortisone shot into his heel   He purchased special shoes and inserts.   He is icing daily with feet up  Ibuprofen 12-16 tabs per day- were helping but now not as much. Denies GI upset.     Working selling cars- fulltime     For exercise: used to walk dogs reularly; feels good with exercise. Looking forward to golfing this spring. Limitations with exercise due to: plantar fasciitis is limiting. Denies CV sxs with exercise including CP, SOB, palpitations, OLIVO, presyncope.    Donating plasma twice weekly- [employer].   He bought B12 and iron gummies he has been taking.       Current providers sharing in care for this patient  include:   Patient Care Team:  Lyubov Almanza PA-C as PCP - General (Physician Assistant - Medical)  Lyubov Almanza PA-C as Assigned PCP    The following health maintenance items are reviewed in Epic and correct as of today:  Health Maintenance   Topic Date Due     ZOSTER IMMUNIZATION (1 of 2) Never done     INFLUENZA VACCINE (1) 09/01/2020     MEDICARE ANNUAL WELLNESS VISIT  01/23/2021     LIPID  01/23/2021     COLORECTAL CANCER SCREENING  01/29/2021     COVID-19 Vaccine (2 of 2 - Pfizer series) 03/27/2021     FALL RISK ASSESSMENT  03/11/2022     ADVANCE CARE PLANNING  01/23/2025     DTAP/TDAP/TD IMMUNIZATION (2 - Td) 12/03/2025     PHQ-2  Completed     Pneumococcal Vaccine: 65+ Years  Completed     AORTIC ANEURYSM SCREENING (SYSTEM ASSIGNED)  Completed     HEPATITIS C SCREENING  Addressed     Pneumococcal Vaccine: Pediatrics (0 to 5 Years) and At-Risk Patients (6 to 64 Years)  Aged Out     IPV IMMUNIZATION  Aged Out     MENINGITIS IMMUNIZATION  Aged Out     HEPATITIS B IMMUNIZATION  Aged Out     Lab work is in process  Labs reviewed in EPIC  BP Readings from Last 3 Encounters:   03/11/21 132/74   01/23/20 128/78   05/03/19 120/76    Wt Readings from Last 3 Encounters:   03/11/21 93.3 kg (205 lb 9.6 oz)   01/23/20 91.1 kg (200 lb 12.8 oz)   05/03/19 90.7 kg (200 lb)                  Patient Active Problem List   Diagnosis     Hyperlipidemia with target LDL less than 160     History of colonic polyps     Erectile dysfunction, unspecified erectile dysfunction type     Advanced directives, counseling/discussion     History of tobacco use     Impaired fasting glucose     Past Surgical History:   Procedure Laterality Date     COLONOSCOPY  2001    Reported as having a polyp removed     COLONOSCOPY  2004    Clear by report     COLONOSCOPY N/A 1/29/2016    Procedure: COLONOSCOPY;  Surgeon: Juanjo Martínez MD;  Location: [facility] OR     COLONOSCOPY WITH CO2 INSUFFLATION N/A 1/29/2016    Procedure:  COLONOSCOPY WITH CO2 INSUFFLATION;  Surgeon: Juanjo Martínez MD;  Location:  OR       Social History     Tobacco Use     Smoking status: Former Smoker     Packs/day: 0.80     Years: 40.00     Pack years: 32.00     Types: Cigarettes     Quit date: 2015     Years since quittin.8     Smokeless tobacco: Never Used     Tobacco comment: quit smoking 2015   Substance Use Topics     Alcohol use: Yes     Alcohol/week: 11.7 standard drinks     Types: 14 Standard drinks or equivalent per week     Comment: Beer mainly- 5-6 per week     Family History   Problem Relation Age of Onset     Heart Disease Father         CHF     Emphysema Father      Cervical Cancer Mother      Lung Cancer Mother      Diabetes No family hx of      Hypertension No family hx of      Breast Cancer No family hx of      Prostate Cancer No family hx of      Mental Illness No family hx of      Osteoporosis No family hx of      Obesity No family hx of          Current Outpatient Medications   Medication Sig Dispense Refill     aspirin 325 MG tablet Take 1.5 tablets (487.5 mg) by mouth daily 120 tablet      diclofenac (VOLTAREN) 50 MG EC tablet Take 1 tablet (50 mg) by mouth 2 times daily 15 tablet 0     famotidine (PEPCID) 20 MG tablet Take 1 tablet (20 mg) by mouth 2 times daily 60 tablet 0     MAGNESIUM PO        Triprolidine-Pseudoephedrine (ANTIHISTAMINE PO) Take 1 tablet daily for itching.       Allergies   Allergen Reactions     Ampicillin          Review of Systems   Constitutional: Negative for chills and fever.   HENT: Negative for congestion, ear pain, hearing loss and sore throat.    Eyes: Negative for pain and visual disturbance.   Respiratory: Negative for cough and shortness of breath.    Cardiovascular: Negative for chest pain, palpitations and peripheral edema.   Gastrointestinal: Negative for abdominal pain, constipation, diarrhea, heartburn, hematochezia and nausea.   Genitourinary: Negative for discharge, dysuria,  "frequency, genital sores, hematuria, impotence and urgency.   Musculoskeletal: Positive for myalgias. Negative for arthralgias and joint swelling.   Skin: Negative for rash.   Neurological: Negative for dizziness, weakness, headaches and paresthesias.   Psychiatric/Behavioral: Negative for mood changes. The patient is not nervous/anxious.        OBJECTIVE:   /74   Pulse 86   Temp 97.6  F (36.4  C) (Temporal)   Resp 12   Ht 1.67 m (5' 5.75\")   Wt 93.3 kg (205 lb 9.6 oz)   SpO2 97%   BMI 33.44 kg/m   Estimated body mass index is 33.44 kg/m  as calculated from the following:    Height as of this encounter: 1.67 m (5' 5.75\").    Weight as of this encounter: 93.3 kg (205 lb 9.6 oz).  Physical Exam  GENERAL: healthy, alert and no distress  EYES: Eyes grossly normal to inspection, PERRL and conjunctivae and sclerae normal  HENT: ear canals and TM's normal, nose and mouth without ulcers or lesions  NECK: no adenopathy, no asymmetry, masses, or scars and thyroid normal to palpation  RESP: lungs clear to auscultation - no rales, rhonchi or wheezes  CV: regular rate and rhythm, normal S1 S2, no S3 or S4, no murmur, click or rub, no peripheral edema and peripheral pulses strong  ABDOMEN: soft, nontender, no hepatosplenomegaly, no masses and bowel sounds normal  MS: LEFT foot: arch and heel pain w/palpation. High arches. 5th toe deviates inward.   otherwise no gross musculoskeletal defects noted, no edema  SKIN: no suspicious lesions or rashes  NEURO: Normal strength and tone, mentation intact and speech normal  PSYCH: mentation appears normal, affect normal/bright  LYMPH: normal ant/post cervical, supraclavicular nodes    Diagnostic Test Results:  Labs reviewed in Epic  Results for orders placed or performed in visit on 03/11/21 (from the past 24 hour(s))   Hemoglobin A1c   Result Value Ref Range    Hemoglobin A1C 5.8 (H) 0 - 5.6 %     Xr Foot Left G/e 3 Views    Result Date: 3/11/2021  XR FOOT LT G/E 3 VW " 3/11/2021 12:35 PM HISTORY: heel and arch pain; Plantar fasciitis; Left foot pain     IMPRESSION: Absence of the fifth toe proximal phalangeal base with dorsal subluxation of the remaining portion of the proximal phalanx relative to the fifth metatarsal head. This may be postsurgical. Otherwise unremarkable foot radiographs. GUMARO TORRES MD      ASSESSMENT / PLAN:   1. Medicare annual wellness visit, subsequent  2. Routine general medical examination at a health care facility  Reviewed VS and weight/BMI with pt   Immunizations:  HOLD off on vaccines today. Pt got covid vaccine recently. Plan for shingrix 2-3 weeks after his 2nd covid shot/booster.   Counseling as below   Health screenings/maintenance per orders/comments below     - Lipid panel reflex to direct LDL Fasting    3. Impaired fasting glucose  Update glucose and a1c  Discussed glucose/a1c results  Advised carb controlled diet, portion control, regular exercise, weight management.   Rescreen/monitor every 6-12 months.     - Hemoglobin A1c  - Basic metabolic panel  (Ca, Cl, CO2, Creat, Gluc, K, Na, BUN)    4. Plantar fasciitis  Overusing ibuprofen. STOP ibuprofen. Try alternative nsaid  Continue icing  Ref to podiatry   - Orthopedic & Spine  Referral; Future  - XR Foot Left G/E 3 Views; Future  - diclofenac (VOLTAREN) 50 MG EC tablet; Take 1 tablet (50 mg) by mouth 2 times daily  Dispense: 15 tablet; Refill: 0    5. Plasma donor  Noted -2x weekly.  Advised against taking extra iron or B12 unless labwork indicates it is needed.     6. NSAID long-term use  Risks w/long term and frequent nsaids discussed  Start pepcid  Hydrate well   - famotidine (PEPCID) 20 MG tablet; Take 1 tablet (20 mg) by mouth 2 times daily  Dispense: 60 tablet; Refill: 0    7. Screening for hyperlipidemia  - Lipid panel reflex to direct LDL Fasting  - ALT  - AST    8. Screen for colon cancer  Pt due for screening colonoscopy- order placed and discussed w/pt   -  "GASTROENTEROLOGY ADULT REF PROCEDURE ONLY; Future    9. Special screening for malignant neoplasm of prostate  Counseled on screening recommendations. Pt agreeable  - Prostate spec antigen screen    10. Left foot pain  See #4  - XR Foot Left G/E 3 Views; Future  - diclofenac (VOLTAREN) 50 MG EC tablet; Take 1 tablet (50 mg) by mouth 2 times daily  Dispense: 15 tablet; Refill: 0    Patient has been advised of split billing requirements and indicates understanding: Yes  COUNSELING:  Reviewed preventive health counseling, as reflected in patient instructions       Regular exercise       Healthy diet/nutrition       Vision screening       Colon cancer screening       Prostate cancer screening       immunizations    Estimated body mass index is 32.66 kg/m  as calculated from the following:    Height as of 1/23/20: 1.67 m (5' 5.75\").    Weight as of 1/23/20: 91.1 kg (200 lb 12.8 oz).    Weight management plan: Discussed healthy diet and exercise guidelines    He reports that he quit smoking about 5 years ago. His smoking use included cigarettes. He has a 32.00 pack-year smoking history. He has never used smokeless tobacco.      Appropriate preventive services were discussed with this patient, including applicable screening as appropriate for cardiovascular disease, diabetes, osteopenia/osteoporosis, and glaucoma.  As appropriate for age/gender, discussed screening for colorectal cancer, prostate cancer, breast cancer, and cervical cancer. Checklist reviewing preventive services available has been given to the patient.    Reviewed patients plan of care and provided an AVS. The Intermediate Care Plan ( asthma action plan, low back pain action plan, and migraine action plan) for Aquiles meets the Care Plan requirement. This Care Plan has been established and reviewed with the Patient.    Counseling Resources:  ATP IV Guidelines  Pooled Cohorts Equation Calculator  Breast Cancer Risk Calculator  Breast Cancer: Medication to " Reduce Risk  FRAX Risk Assessment  ICSI Preventive Guidelines  Dietary Guidelines for Americans, 2010  USDA's MyPlate  ASA Prophylaxis  Lung CA Screening    JACQUES Zimmerman [facility] ARELIS    Identified Health Risks:    He is at risk for lack of exercise and has been provided with information to increase physical activity for the benefit of his well-being.

## 2021-03-10 ASSESSMENT — ENCOUNTER SYMPTOMS
FEVER: 0
DYSURIA: 0
WEAKNESS: 0
FREQUENCY: 0
DIARRHEA: 0
HEMATURIA: 0
ARTHRALGIAS: 0
ABDOMINAL PAIN: 0
DIZZINESS: 0
SORE THROAT: 0
HEMATOCHEZIA: 0
CONSTIPATION: 0
PARESTHESIAS: 0
SHORTNESS OF BREATH: 0
PALPITATIONS: 0
HEADACHES: 0
JOINT SWELLING: 0
MYALGIAS: 1
EYE PAIN: 0
HEARTBURN: 0
NAUSEA: 0
NERVOUS/ANXIOUS: 0
COUGH: 0
CHILLS: 0

## 2021-03-10 ASSESSMENT — ACTIVITIES OF DAILY LIVING (ADL): CURRENT_FUNCTION: NO ASSISTANCE NEEDED

## 2021-03-11 ENCOUNTER — OFFICE VISIT (OUTPATIENT)
Dept: FAMILY MEDICINE | Facility: CLINIC | Age: 71
End: 2021-03-11
Payer: COMMERCIAL

## 2021-03-11 ENCOUNTER — ANCILLARY PROCEDURE (OUTPATIENT)
Dept: GENERAL RADIOLOGY | Facility: CLINIC | Age: 71
End: 2021-03-11
Attending: PHYSICIAN ASSISTANT
Payer: COMMERCIAL

## 2021-03-11 VITALS
OXYGEN SATURATION: 97 % | HEART RATE: 86 BPM | WEIGHT: 205.6 LBS | RESPIRATION RATE: 12 BRPM | SYSTOLIC BLOOD PRESSURE: 132 MMHG | DIASTOLIC BLOOD PRESSURE: 74 MMHG | BODY MASS INDEX: 33.04 KG/M2 | HEIGHT: 66 IN | TEMPERATURE: 97.6 F

## 2021-03-11 DIAGNOSIS — M79.672 LEFT FOOT PAIN: ICD-10-CM

## 2021-03-11 DIAGNOSIS — Z12.11 SCREEN FOR COLON CANCER: ICD-10-CM

## 2021-03-11 DIAGNOSIS — Z52.008 PLASMA DONOR: ICD-10-CM

## 2021-03-11 DIAGNOSIS — Z00.00 ENCOUNTER FOR MEDICARE ANNUAL WELLNESS EXAM: ICD-10-CM

## 2021-03-11 DIAGNOSIS — Z12.5 SPECIAL SCREENING FOR MALIGNANT NEOPLASM OF PROSTATE: ICD-10-CM

## 2021-03-11 DIAGNOSIS — R73.01 IMPAIRED FASTING GLUCOSE: ICD-10-CM

## 2021-03-11 DIAGNOSIS — Z00.00 MEDICARE ANNUAL WELLNESS VISIT, SUBSEQUENT: Primary | ICD-10-CM

## 2021-03-11 DIAGNOSIS — Z79.1 NSAID LONG-TERM USE: ICD-10-CM

## 2021-03-11 DIAGNOSIS — M72.2 PLANTAR FASCIITIS: ICD-10-CM

## 2021-03-11 DIAGNOSIS — Z13.220 SCREENING FOR HYPERLIPIDEMIA: ICD-10-CM

## 2021-03-11 DIAGNOSIS — Z00.00 ROUTINE GENERAL MEDICAL EXAMINATION AT A HEALTH CARE FACILITY: ICD-10-CM

## 2021-03-11 LAB
ALT SERPL W P-5'-P-CCNC: 27 U/L (ref 0–70)
ANION GAP SERPL CALCULATED.3IONS-SCNC: 5 MMOL/L (ref 3–14)
AST SERPL W P-5'-P-CCNC: 19 U/L (ref 0–45)
BUN SERPL-MCNC: 22 MG/DL (ref 7–30)
CALCIUM SERPL-MCNC: 8.4 MG/DL (ref 8.5–10.1)
CHLORIDE SERPL-SCNC: 107 MMOL/L (ref 94–109)
CHOLEST SERPL-MCNC: 152 MG/DL
CO2 SERPL-SCNC: 27 MMOL/L (ref 20–32)
CREAT SERPL-MCNC: 1.1 MG/DL (ref 0.66–1.25)
GFR SERPL CREATININE-BSD FRML MDRD: 68 ML/MIN/{1.73_M2}
GLUCOSE SERPL-MCNC: 97 MG/DL (ref 70–99)
HBA1C MFR BLD: 5.8 % (ref 0–5.6)
HDLC SERPL-MCNC: 52 MG/DL
LDLC SERPL CALC-MCNC: 82 MG/DL
NONHDLC SERPL-MCNC: 100 MG/DL
POTASSIUM SERPL-SCNC: 4.8 MMOL/L (ref 3.4–5.3)
PSA SERPL-ACNC: 3.21 UG/L (ref 0–4)
SODIUM SERPL-SCNC: 139 MMOL/L (ref 133–144)
TRIGL SERPL-MCNC: 92 MG/DL

## 2021-03-11 PROCEDURE — 84450 TRANSFERASE (AST) (SGOT): CPT | Performed by: PHYSICIAN ASSISTANT

## 2021-03-11 PROCEDURE — G0103 PSA SCREENING: HCPCS | Performed by: PHYSICIAN ASSISTANT

## 2021-03-11 PROCEDURE — 99214 OFFICE O/P EST MOD 30 MIN: CPT | Mod: 25 | Performed by: PHYSICIAN ASSISTANT

## 2021-03-11 PROCEDURE — 80061 LIPID PANEL: CPT | Performed by: PHYSICIAN ASSISTANT

## 2021-03-11 PROCEDURE — 36415 COLL VENOUS BLD VENIPUNCTURE: CPT | Performed by: PHYSICIAN ASSISTANT

## 2021-03-11 PROCEDURE — 83036 HEMOGLOBIN GLYCOSYLATED A1C: CPT | Performed by: PHYSICIAN ASSISTANT

## 2021-03-11 PROCEDURE — 73630 X-RAY EXAM OF FOOT: CPT | Mod: LT | Performed by: RADIOLOGY

## 2021-03-11 PROCEDURE — 80048 BASIC METABOLIC PNL TOTAL CA: CPT | Performed by: PHYSICIAN ASSISTANT

## 2021-03-11 PROCEDURE — 84460 ALANINE AMINO (ALT) (SGPT): CPT | Performed by: PHYSICIAN ASSISTANT

## 2021-03-11 PROCEDURE — 99397 PER PM REEVAL EST PAT 65+ YR: CPT | Performed by: PHYSICIAN ASSISTANT

## 2021-03-11 RX ORDER — FAMOTIDINE 20 MG/1
20 TABLET, FILM COATED ORAL 2 TIMES DAILY
Qty: 60 TABLET | Refills: 0 | Status: SHIPPED | OUTPATIENT
Start: 2021-03-11 | End: 2021-04-02

## 2021-03-11 ASSESSMENT — ENCOUNTER SYMPTOMS
PARESTHESIAS: 0
HEMATOCHEZIA: 0
DIZZINESS: 0
HEMATURIA: 0
FREQUENCY: 0
EYE PAIN: 0
PALPITATIONS: 0
DIARRHEA: 0
NAUSEA: 0
SHORTNESS OF BREATH: 0
ARTHRALGIAS: 0
NERVOUS/ANXIOUS: 0
WEAKNESS: 0
HEARTBURN: 0
CHILLS: 0
HEADACHES: 0
SORE THROAT: 0
ABDOMINAL PAIN: 0
FEVER: 0
JOINT SWELLING: 0
MYALGIAS: 1
COUGH: 0
DYSURIA: 0
CONSTIPATION: 0

## 2021-03-11 ASSESSMENT — MIFFLIN-ST. JEOR: SCORE: 1636.35

## 2021-03-11 ASSESSMENT — ACTIVITIES OF DAILY LIVING (ADL): CURRENT_FUNCTION: NO ASSISTANCE NEEDED

## 2021-03-15 ENCOUNTER — TELEPHONE (OUTPATIENT)
Dept: FAMILY MEDICINE | Facility: CLINIC | Age: 71
End: 2021-03-15

## 2021-03-15 NOTE — TELEPHONE ENCOUNTER
Our goal is to have forms completed with 72 hours, however some forms may require a visit or additional information.    Who is the form from?: [employer]  (if other please explain)  Where the form came from: form was faxed in  What clinic location was the form placed at?: [facility]  Where the form was placed: placed in [facility] inbox     What number is listed as a contact on the form?: [phone]  Fax number to return form to:  [phone]        Call taken on 3/15/2021 at 9:27 AM by Freda Hutchinson

## 2021-03-16 ENCOUNTER — OFFICE VISIT (OUTPATIENT)
Dept: PODIATRY | Facility: CLINIC | Age: 71
End: 2021-03-16
Payer: COMMERCIAL

## 2021-03-16 VITALS
WEIGHT: 205.6 LBS | HEIGHT: 66 IN | DIASTOLIC BLOOD PRESSURE: 66 MMHG | SYSTOLIC BLOOD PRESSURE: 120 MMHG | BODY MASS INDEX: 33.04 KG/M2

## 2021-03-16 DIAGNOSIS — M72.2 PLANTAR FASCIITIS OF LEFT FOOT: Primary | ICD-10-CM

## 2021-03-16 PROCEDURE — 99203 OFFICE O/P NEW LOW 30 MIN: CPT | Performed by: PODIATRIST

## 2021-03-16 RX ORDER — NAPROXEN 500 MG/1
500 TABLET ORAL 2 TIMES DAILY WITH MEALS
Qty: 60 TABLET | Refills: 1 | Status: SHIPPED | OUTPATIENT
Start: 2021-03-16 | End: 2021-10-22

## 2021-03-16 ASSESSMENT — PAIN SCALES - GENERAL: PAINLEVEL: SEVERE PAIN (7)

## 2021-03-16 ASSESSMENT — MIFFLIN-ST. JEOR: SCORE: 1636.38

## 2021-03-16 NOTE — PATIENT INSTRUCTIONS
PLANTAR FASCIITIS  The  plantar fascia  is a thick fibrous layer of tissue that covers the bones on the bottom of your foot. It supports the foot bones in an arched position.  Plantar fasciitis  is a painful inflammation of the plantar fascia due to overuse. This can develop gradually or suddenly. It usually affects one foot at a time but can affect both feet. Heel pain can be sharp and feel like a knife sticking in the bottom of your foot. Pain may occur after exercising, long distance jogging, stair climbing, long periods of standing, or after getting up from a seated position.  Risk factors include arthritis, diabetes, obesity or recent weight gain, flat-foot, high arch, wearing high heels or loose shoes or shoes with poor arch support.  Sudden changes in activity or shoe gear may contribute to symptoms.  Foot pain from this condition is usually worse in the morning and improves with walking. By the end of the day there may be a dull aching. Treatment requires improved support of feet, short-term rest and controlling inflammation. It may take up to nine months before all symptoms go away with the measures described below.  A steroid injection into the foot, or surgery may be needed if this is becomes long standing or severe.  HOME CARE  1. If you are overweight, lose weight to promote healing.  2. Choose supportive shoes (stiff through the shank) with good arch support and shock absorbency. Replace athletic shoes when they become worn out. Don t walk or run barefoot.  3. Shoe inserts are an important part of treatment. You can buy off-the-shelf shoe inserts inexpensively such as [employer].  The best ones are custom molded to your foot with a prescription.  4. Night splints keep the plantar fascia gently stretched while you sleep and will eliminate morning pain. Wear it ALL NIGHT EVERY NIGHT, or any time you sit for a long time.  5. Reduce by 10% or more the activities that stress the feet: jogging, prolonged  standing or walking, high impact sports, etc.  6. Stretch your feet. Gently flex your ankle by leaning into a wall or counter or drop your heel from a step.  Stretch two minutes of every hour you are awake.  7. Icing or massage may help heel pain. Apply an ice pack or frozen water or Coke bottle to the heel for 10-20 minutes as a preventive or after an acute flare of symptoms. You may repeat this as needed.   Follow up with your Doctor in 3 weeks as instructed.      Reliable shoe stores: To maximize your experience and provide the best possible fit.  Be sure to show them your foot concerns and tell them Dr. Ramirez sent you.      Stores listed in bold have only athletic shoes, and stores that are not bold are mostly casual or variety of shoes    [employer]  [address] [place] [address]  [phone]     [employer] [facility]  [address] [place] [address]  [phone]     [employer]  [address] [place] [address]  [phone]    [email or web address]  [address] [facility] [address]  [phone]    [employer]  [address] [place], [place] [address] [phone]    [employer]  [address] [place] [address]  [phone]                         [employer] Locations:     [address] [place] [address]   [phone]     [address] [place] [address]   [phone]     [address] [place] [address]   [phone]     [address] [place] [address]   [phone]     [address] [place] [address]   [phone]     [address] [place] [address]   [phone]     [address] [facility] [address]   [place] [address]   [phone]     [address]   [place], [place] [id number] [phone]             How to find reasonable shoes          The correct width    Correct Fitting    Correct Length      Foot Distortion    Posture Distortion                           Torsional Rigidity      Grasp behind the heel and underneath the foot and twist      Bad    Excessive torsion/twist in midfoot     Less torsion/twist in midfoot is better                   Heel Counter Rigidity      Grasp just above   midsole and squeeze      Bad    Soft heel counter      Good    Rigid Heel Counter      Flexion Rigidity      Grasp shoe and bend from forefoot to rearfoot

## 2021-03-16 NOTE — PROGRESS NOTES
HPI:  Aquiles Mckeon is a 70 year old male who is seen in consultation at the request of Lyubov Almanza PA-C    Pt presents for eval of:   (Onset, Location, L/R, Character, Treatments, Injury if yes)    XR Left foot 3/11/2021     Onset mid Nov 2020, plantar Left heel/arch pain 7/10. No injury noted.  Tightness with first steps after lying or sitting, intermittent sharp jolts. Once throbbing, tenderness starts pain increases. Worse barefoot.    Walk about inserts that he bought online.  Different shoes.  Chiropractor adjustments.  [facility]  Minimal barefoot walking.    Works in [employer] in [place]. 45-50 hour work weeks.    Weight management plan: Patient was referred to their PCP to discuss a diet and exercise plan.       ROS: 10 point ROS neg other than the symptoms noted above in the HPI.    Patient Active Problem List   Diagnosis     Hyperlipidemia with target LDL less than 160     History of colonic polyps     Erectile dysfunction, unspecified erectile dysfunction type     Advanced directives, counseling/discussion     History of tobacco use     Impaired fasting glucose       PAST MEDICAL HISTORY:   Past Medical History:   Diagnosis Date     ED (erectile dysfunction)      Hx of colonic polyp 2001     PAST SURGICAL HISTORY:   Past Surgical History:   Procedure Laterality Date     COLONOSCOPY  2001    Reported as having a polyp removed     COLONOSCOPY  2004    Clear by report     COLONOSCOPY N/A 1/29/2016    Procedure: COLONOSCOPY;  Surgeon: Juanjo Martínez MD;  Location: [facility]     COLONOSCOPY WITH CO2 INSUFFLATION N/A 1/29/2016    Procedure: COLONOSCOPY WITH CO2 INSUFFLATION;  Surgeon: Juanjo Martínez MD;  Location: [facility]     MEDICATIONS:   Current Outpatient Medications:      diclofenac (VOLTAREN) 50 MG EC tablet, Take 1 tablet (50 mg) by mouth 2 times daily, Disp: 15 tablet, Rfl: 0     famotidine (PEPCID) 20 MG tablet, Take 1 tablet  (20 mg) by mouth 2 times daily, Disp: 60 tablet, Rfl: 0     aspirin 325 MG tablet, Take 1.5 tablets (487.5 mg) by mouth daily, Disp: 120 tablet, Rfl:      MAGNESIUM PO, , Disp: , Rfl:      Triprolidine-Pseudoephedrine (ANTIHISTAMINE PO), Take 1 tablet daily for itching., Disp: , Rfl:   ALLERGIES:    Allergies   Allergen Reactions     Ampicillin      SOCIAL HISTORY:   Social History     Socioeconomic History     Marital status:      Spouse name: Not on file     Number of children: Not on file     Years of education: Not on file     Highest education level: Not on file   Occupational History     Not on file   Social Needs     Financial resource strain: Not on file     Food insecurity     Worry: Not on file     Inability: Not on file     Transportation needs     Medical: Not on file     Non-medical: Not on file   Tobacco Use     Smoking status: Former Smoker     Packs/day: 0.80     Years: 40.00     Pack years: 32.00     Types: Cigarettes     Quit date: 2015     Years since quittin.8     Smokeless tobacco: Never Used     Tobacco comment: quit smoking 2015   Substance and Sexual Activity     Alcohol use: Yes     Alcohol/week: 11.7 standard drinks     Types: 14 Standard drinks or equivalent per week     Comment: Beer mainly- 5-6 per week     Drug use: No     Sexual activity: Yes     Partners: Female     Comment: not currently   Lifestyle     Physical activity     Days per week: Not on file     Minutes per session: Not on file     Stress: Not on file   Relationships     Social connections     Talks on phone: Not on file     Gets together: Not on file     Attends Confucianist service: Not on file     Active member of club or organization: Not on file     Attends meetings of clubs or organizations: Not on file     Relationship status: Not on file     Intimate partner violence     Fear of current or ex partner: Not on file     Emotionally abused: Not on file     Physically abused: Not on file     Forced  "sexual activity: Not on file   Other Topics Concern     Parent/sibling w/ CABG, MI or angioplasty before 65F 55M? No   Social History Narrative     Not on file     FAMILY HISTORY:   Family History   Problem Relation Age of Onset     Heart Disease Father         CHF     Emphysema Father      Cervical Cancer Mother      Lung Cancer Mother      Diabetes No family hx of      Hypertension No family hx of      Breast Cancer No family hx of      Prostate Cancer No family hx of      Mental Illness No family hx of      Osteoporosis No family hx of      Obesity No family hx of        EXAM:Vitals: /66 (BP Location: Left arm, Patient Position: Sitting, Cuff Size: Adult Regular)   Ht 1.67 m (5' 5.75\")   Wt 93.3 kg (205 lb 9.6 oz)   BMI 33.44 kg/m    BMI= Body mass index is 33.44 kg/m .    General appearance: Patient is alert and fully cooperative with history & exam.  No sign of distress is noted during the visit.     Psychiatric: Affect is pleasant & appropriate.  Patient appears motivated to improve health.     Respiratory: Breathing is regular & unlabored while sitting.     HEENT: Hearing is intact to spoken word.  Speech is clear.  No gross evidence of visual impairment that would impact ambulation.     Vascular: DP & PT 2/4 & regular bilaterally.  No significant edema, rubor or varicosities noted.  CFT and skin temperature is normal to both lower extremities.       Neurologic: Lower extremity sensation is intact to light touch.  No evidence of weakness in the lower extremities.  No evidence of neuropathy and negative tinel sign.     Dermatologic: Skin is intact to both lower extremities without significant lesions, rash or abrasion.  Normal texture turgor and tone. No paronychia or evidence of soft tissue infection is noted.    Musculoskeletal: Patient is ambulatory without assistive device or brace. Pain is noted with firm palpation along the medial band of the plantar fascia left foot most notably at the " origination upon the calcaneus not through the arch.  No pain with compression of the calcaneus medial to lateral or with palpation of the achilles, peroneal or posterior tibial tendons.  Slightly more than 0  of ankle joint dorsiflexion without crepitus or pain throughout the ankle, subtalar or midtarsal joints.  No pain or limitations throughout manual muscle strength testing plus 5/5 to all four quadrants bilateral.  No palpable edema noted.      Radiographs:  Osteophyte noted about the plantar calcaneus consistent with plantar fasciitis. Elevated calcaneal inclination angle consistent with pes cavus.     ASSESSMENT:       ICD-10-CM    1. Plantar fasciitis of left foot  M72.2        PLAN:  Reviewed patient's chart in [facility] and discussed etiology and treatment options.      Treatments:  3/16/2021  Obtained and interpreted radiographs.   Discontinue barefoot walking or unsupported walking in shoes without shank.  Dispensed written instructions to obtain appropriate shoe gear and/or OTC inserts.  Dispensed anterior night splint to use all night every night.    Prescription oral naprosyn for a short course. Discussed risks. stop all other nsaids for now   Prescription for custom molded orthotics 3/16/2021  Follow up in 4-5 weeks       Rosalio Ramirez DPM

## 2021-03-16 NOTE — LETTER
3/16/2021         RE: Aquiles Mckeon  [id number] [facility] [address]        Dear Colleague,    Thank you for referring your patient, Aquiles Mckeon, to the [facility]. Please see a copy of my visit note below.    HPI:  Aquiles Mckeon is a 70 year old male who is seen in consultation at the request of Lyubov Almanza PA-C    Pt presents for eval of:   (Onset, Location, L/R, Character, Treatments, Injury if yes)    XR Left foot 3/11/2021     Onset mid Nov 2020, plantar Left heel/arch pain 7/10. No injury noted.  Tightness with first steps after lying or sitting, intermittent sharp jolts. Once throbbing, tenderness starts pain increases. Worse barefoot.    Walk about inserts that he bought online.  Different shoes.  Chiropractor adjustments.  [facility]  Minimal barefoot walking.    Works in [employer] in [place]. 45-50 hour work weeks.    Weight management plan: Patient was referred to their PCP to discuss a diet and exercise plan.       ROS: 10 point ROS neg other than the symptoms noted above in the HPI.    Patient Active Problem List   Diagnosis     Hyperlipidemia with target LDL less than 160     History of colonic polyps     Erectile dysfunction, unspecified erectile dysfunction type     Advanced directives, counseling/discussion     History of tobacco use     Impaired fasting glucose       PAST MEDICAL HISTORY:   Past Medical History:   Diagnosis Date     ED (erectile dysfunction)      Hx of colonic polyp 2001     PAST SURGICAL HISTORY:   Past Surgical History:   Procedure Laterality Date     COLONOSCOPY  2001    Reported as having a polyp removed     COLONOSCOPY  2004    Clear by report     COLONOSCOPY N/A 1/29/2016    Procedure: COLONOSCOPY;  Surgeon: Juanjo Martínez MD;  Location: [facility]     COLONOSCOPY WITH CO2 INSUFFLATION N/A 1/29/2016    Procedure: COLONOSCOPY WITH CO2 INSUFFLATION;  Surgeon: Juanjo Martínez  MD Lonnie;  Location: [facility]     MEDICATIONS:   Current Outpatient Medications:      diclofenac (VOLTAREN) 50 MG EC tablet, Take 1 tablet (50 mg) by mouth 2 times daily, Disp: 15 tablet, Rfl: 0     famotidine (PEPCID) 20 MG tablet, Take 1 tablet (20 mg) by mouth 2 times daily, Disp: 60 tablet, Rfl: 0     aspirin 325 MG tablet, Take 1.5 tablets (487.5 mg) by mouth daily, Disp: 120 tablet, Rfl:      MAGNESIUM PO, , Disp: , Rfl:      Triprolidine-Pseudoephedrine (ANTIHISTAMINE PO), Take 1 tablet daily for itching., Disp: , Rfl:   ALLERGIES:    Allergies   Allergen Reactions     Ampicillin      SOCIAL HISTORY:   Social History     Socioeconomic History     Marital status:      Spouse name: Not on file     Number of children: Not on file     Years of education: Not on file     Highest education level: Not on file   Occupational History     Not on file   Social Needs     Financial resource strain: Not on file     Food insecurity     Worry: Not on file     Inability: Not on file     Transportation needs     Medical: Not on file     Non-medical: Not on file   Tobacco Use     Smoking status: Former Smoker     Packs/day: 0.80     Years: 40.00     Pack years: 32.00     Types: Cigarettes     Quit date: 2015     Years since quittin.8     Smokeless tobacco: Never Used     Tobacco comment: quit smoking 2015   Substance and Sexual Activity     Alcohol use: Yes     Alcohol/week: 11.7 standard drinks     Types: 14 Standard drinks or equivalent per week     Comment: Beer mainly- 5-6 per week     Drug use: No     Sexual activity: Yes     Partners: Female     Comment: not currently   Lifestyle     Physical activity     Days per week: Not on file     Minutes per session: Not on file     Stress: Not on file   Relationships     Social connections     Talks on phone: Not on file     Gets together: Not on file     Attends Anabaptist service: Not on file     Active member of club or organization: Not on file     Attends  "meetings of clubs or organizations: Not on file     Relationship status: Not on file     Intimate partner violence     Fear of current or ex partner: Not on file     Emotionally abused: Not on file     Physically abused: Not on file     Forced sexual activity: Not on file   Other Topics Concern     Parent/sibling w/ CABG, MI or angioplasty before 65F 55M? No   Social History Narrative     Not on file     FAMILY HISTORY:   Family History   Problem Relation Age of Onset     Heart Disease Father         CHF     Emphysema Father      Cervical Cancer Mother      Lung Cancer Mother      Diabetes No family hx of      Hypertension No family hx of      Breast Cancer No family hx of      Prostate Cancer No family hx of      Mental Illness No family hx of      Osteoporosis No family hx of      Obesity No family hx of        EXAM:Vitals: /66 (BP Location: Left arm, Patient Position: Sitting, Cuff Size: Adult Regular)   Ht 1.67 m (5' 5.75\")   Wt 93.3 kg (205 lb 9.6 oz)   BMI 33.44 kg/m    BMI= Body mass index is 33.44 kg/m .    General appearance: Patient is alert and fully cooperative with history & exam.  No sign of distress is noted during the visit.     Psychiatric: Affect is pleasant & appropriate.  Patient appears motivated to improve health.     Respiratory: Breathing is regular & unlabored while sitting.     HEENT: Hearing is intact to spoken word.  Speech is clear.  No gross evidence of visual impairment that would impact ambulation.     Vascular: DP & PT 2/4 & regular bilaterally.  No significant edema, rubor or varicosities noted.  CFT and skin temperature is normal to both lower extremities.       Neurologic: Lower extremity sensation is intact to light touch.  No evidence of weakness in the lower extremities.  No evidence of neuropathy and negative tinel sign.     Dermatologic: Skin is intact to both lower extremities without significant lesions, rash or abrasion.  Normal texture turgor and tone. No " paronychia or evidence of soft tissue infection is noted.    Musculoskeletal: Patient is ambulatory without assistive device or brace. Pain is noted with firm palpation along the medial band of the plantar fascia left foot most notably at the origination upon the calcaneus not through the arch.  No pain with compression of the calcaneus medial to lateral or with palpation of the achilles, peroneal or posterior tibial tendons.  Slightly more than 0  of ankle joint dorsiflexion without crepitus or pain throughout the ankle, subtalar or midtarsal joints.  No pain or limitations throughout manual muscle strength testing plus 5/5 to all four quadrants bilateral.  No palpable edema noted.      Radiographs:  Osteophyte noted about the plantar calcaneus consistent with plantar fasciitis. Elevated calcaneal inclination angle consistent with pes cavus.     ASSESSMENT:       ICD-10-CM    1. Plantar fasciitis of left foot  M72.2        PLAN:  Reviewed patient's chart in [facility] and discussed etiology and treatment options.      Treatments:  3/16/2021  Obtained and interpreted radiographs.   Discontinue barefoot walking or unsupported walking in shoes without shank.  Dispensed written instructions to obtain appropriate shoe gear and/or OTC inserts.  Dispensed anterior night splint to use all night every night.    Prescription oral naprosyn for a short course. Discussed risks. stop all other nsaids for now   Prescription for custom molded orthotics 3/16/2021  Follow up in 4-5 weeks       Rosalio Ramirez DPM        Again, thank you for allowing me to participate in the care of your patient.        Sincerely,        Rosalio Ramirez DPM

## 2021-04-02 DIAGNOSIS — Z79.1 NSAID LONG-TERM USE: ICD-10-CM

## 2021-04-02 RX ORDER — FAMOTIDINE 20 MG/1
TABLET, FILM COATED ORAL
Qty: 60 TABLET | Refills: 0 | Status: SHIPPED | OUTPATIENT
Start: 2021-04-02 | End: 2021-05-06

## 2021-04-02 NOTE — TELEPHONE ENCOUNTER
Prescription approved per [facility] Refill Protocol.  Ember Hussein RN, BSN  [place]  April 2, 2021

## 2021-04-20 ENCOUNTER — HOSPITAL ENCOUNTER (OUTPATIENT)
Facility: AMBULATORY SURGERY CENTER | Age: 71
End: 2021-04-20
Attending: INTERNAL MEDICINE
Payer: COMMERCIAL

## 2021-04-22 ENCOUNTER — ALLIED HEALTH/NURSE VISIT (OUTPATIENT)
Dept: FAMILY MEDICINE | Facility: CLINIC | Age: 71
End: 2021-04-22
Payer: COMMERCIAL

## 2021-04-22 DIAGNOSIS — Z23 NEED FOR VACCINATION: Primary | ICD-10-CM

## 2021-04-22 PROCEDURE — 90750 HZV VACC RECOMBINANT IM: CPT

## 2021-04-22 PROCEDURE — 90471 IMMUNIZATION ADMIN: CPT

## 2021-04-22 PROCEDURE — 99207 PR NO CHARGE NURSE ONLY: CPT

## 2021-04-22 NOTE — NURSING NOTE
Prior to immunization administration, verified patients identity using patient s name and date of birth. Please see Immunization Activity for additional information.     Screening Questionnaire for Adult Immunization    Are you sick today?   No   Do you have allergies to medications, food, a vaccine component or latex?   No   Have you ever had a serious reaction after receiving a vaccination?   No   Do you have a long-term health problem with heart, lung, kidney, or metabolic disease (e.g., diabetes), asthma, a blood disorder, no spleen, complement component deficiency, a cochlear implant, or a spinal fluid leak?  Are you on long-term aspirin therapy?   No   Do you have cancer, leukemia, HIV/AIDS, or any other immune system problem?   No   Do you have a parent, brother, or sister with an immune system problem?   No   In the past 3 months, have you taken medications that affect  your immune system, such as prednisone, other steroids, or anticancer drugs; drugs for the treatment of rheumatoid arthritis, Crohn s disease, or psoriasis; or have you had radiation treatments?   No   Have you had a seizure, or a brain or other nervous system problem?   No   During the past year, have you received a transfusion of blood or blood    products, or been given immune (gamma) globulin or antiviral drug?   No   For women: Are you pregnant or is there a chance you could become       pregnant during the next month?   No   Have you received any vaccinations in the past 4 weeks?   Yes- covid shot.     Immunization questionnaire answers were all negative.         Given by Radha Luther. Patient instructed to remain in clinic for 15 minutes afterwards, and to report any adverse reaction to me immediately.

## 2021-05-05 ENCOUNTER — TELEPHONE (OUTPATIENT)
Dept: PODIATRY | Facility: CLINIC | Age: 71
End: 2021-05-05

## 2021-05-05 NOTE — TELEPHONE ENCOUNTER
"rec'd a fax refill request from [facility] for     naproxen (NAPROSYN) 500 MG tablet    Spoke to patient and he DID NOT request a refill for this medication. He is aware that he will need any office visit if there is a refill request for this medication.    Faxed request back with following note:  \"Patient did NOT request a refill. OV required for any future refills. linda\"  "

## 2021-05-06 DIAGNOSIS — Z79.1 NSAID LONG-TERM USE: ICD-10-CM

## 2021-05-06 RX ORDER — FAMOTIDINE 20 MG/1
TABLET, FILM COATED ORAL
Qty: 180 TABLET | Refills: 1 | Status: SHIPPED | OUTPATIENT
Start: 2021-05-06 | End: 2021-10-22

## 2021-05-06 NOTE — TELEPHONE ENCOUNTER
Prescription approved per [facility] Refill Protocol.    Roxana Caba RN on 5/6/2021 at 4:04 PM

## 2021-08-31 ENCOUNTER — ALLIED HEALTH/NURSE VISIT (OUTPATIENT)
Dept: FAMILY MEDICINE | Facility: CLINIC | Age: 71
End: 2021-08-31
Payer: COMMERCIAL

## 2021-08-31 DIAGNOSIS — Z23 NEED FOR VACCINATION: Primary | ICD-10-CM

## 2021-08-31 PROCEDURE — 90471 IMMUNIZATION ADMIN: CPT

## 2021-08-31 PROCEDURE — 90750 HZV VACC RECOMBINANT IM: CPT

## 2021-08-31 PROCEDURE — 99207 PR NO CHARGE NURSE ONLY: CPT

## 2021-08-31 NOTE — PROGRESS NOTES
Prior to immunization administration, verified patients identity using patient s name and date of birth. Please see Immunization Activity for additional information.     Screening Questionnaire for Adult Immunization    Are you sick today?   No   Do you have allergies to medications, food, a vaccine component or latex?   Yes   Have you ever had a serious reaction after receiving a vaccination?   No   Do you have a long-term health problem with heart, lung, kidney, or metabolic disease (e.g., diabetes), asthma, a blood disorder, no spleen, complement component deficiency, a cochlear implant, or a spinal fluid leak?  Are you on long-term aspirin therapy?   No   Do you have cancer, leukemia, HIV/AIDS, or any other immune system problem?   No   Do you have a parent, brother, or sister with an immune system problem?   No   In the past 3 months, have you taken medications that affect  your immune system, such as prednisone, other steroids, or anticancer drugs; drugs for the treatment of rheumatoid arthritis, Crohn s disease, or psoriasis; or have you had radiation treatments?   No   Have you had a seizure, or a brain or other nervous system problem?   No   During the past year, have you received a transfusion of blood or blood    products, or been given immune (gamma) globulin or antiviral drug?   No   For women: Are you pregnant or is there a chance you could become       pregnant during the next month?   No   Have you received any vaccinations in the past 4 weeks?   No     Immunization questionnaire was positive for at least one answer.  Notified .        injection of shingrix given by Linda Abad. Patient instructed to remain in clinic for 15 minutes afterwards, and to report any adverse reaction to me immediately.       Screening performed by Linda Abad on 8/31/2021 at 4:08 PM.

## 2021-10-03 ENCOUNTER — HEALTH MAINTENANCE LETTER (OUTPATIENT)
Age: 71
End: 2021-10-03

## 2021-10-21 NOTE — PROGRESS NOTES
"  Assessment & Plan     Left-sided Bell's palsy  71-year-old male with history and exam consistent with Bell's palsy left side.  Started 2 days ago, so we will treat with prednisone.  No evidence of Cinthya Hunt syndrome, he has been fully vaccinated against shingles.  No other symptoms, no history of CVA or CAD.  He will follow-up with me if no improvement or any worsening.  - predniSONE (DELTASONE) 20 MG tablet; Take 3 tablets (60 mg) by mouth daily for 5 days    6}     BMI:   Estimated body mass index is 32.61 kg/m  as calculated from the following:    Height as of this encounter: 1.67 m (5' 5.75\").    Weight as of this encounter: 90.9 kg (200 lb 8 oz).           Return in about 4 weeks (around 11/19/2021) for Flu shot.    Tim Petit MD  [facility] ARELIS Garber is a 71 year old who presents for the following health issues     History of Present Illness       He eats 2-3 servings of fruits and vegetables daily.He consumes 0 sweetened beverage(s) daily.He exercises with enough effort to increase his heart rate 10 to 19 minutes per day.  He exercises with enough effort to increase his heart rate 3 or less days per week.   He is taking medications regularly.     Patient stated the left side of his face started drooping for 3 days,   Burning sensation. No function of left side of mouth. 0/10 on pain scale currently.             Review of Systems   Constitutional, HEENT, cardiovascular, pulmonary, gi and gu systems are negative, except as otherwise noted.      Objective    /84   Pulse 75   Temp 97.3  F (36.3  C) (Temporal)   Resp 16   Ht 1.67 m (5' 5.75\")   Wt 90.9 kg (200 lb 8 oz)   SpO2 97%   BMI 32.61 kg/m    Body mass index is 32.61 kg/m .  Physical Exam   GENERAL: healthy, alert and no distress  EYES: PERRL and difficulty closing left eyelid  HENT: ear canals and TM's normal, nose and mouth without ulcers or lesions, oropharynx clear, oral mucous membranes moist and " left facial nerve distribution palsy  NECK: no adenopathy, no asymmetry, masses, or scars and thyroid normal to palpation  RESP: lungs clear to auscultation - no rales, rhonchi or wheezes  CV: regular rate and rhythm, normal S1 S2, no S3 or S4, no murmur, click or rub, no peripheral edema and peripheral pulses strong  MS: no gross musculoskeletal defects noted, no edema  PSYCH: mentation appears normal, affect normal/bright

## 2021-10-22 ENCOUNTER — OFFICE VISIT (OUTPATIENT)
Dept: FAMILY MEDICINE | Facility: CLINIC | Age: 71
End: 2021-10-22
Payer: COMMERCIAL

## 2021-10-22 VITALS
RESPIRATION RATE: 16 BRPM | DIASTOLIC BLOOD PRESSURE: 84 MMHG | HEART RATE: 75 BPM | HEIGHT: 66 IN | WEIGHT: 200.5 LBS | TEMPERATURE: 97.3 F | SYSTOLIC BLOOD PRESSURE: 122 MMHG | OXYGEN SATURATION: 97 % | BODY MASS INDEX: 32.22 KG/M2

## 2021-10-22 DIAGNOSIS — G51.0 LEFT-SIDED BELL'S PALSY: Primary | ICD-10-CM

## 2021-10-22 PROCEDURE — 99214 OFFICE O/P EST MOD 30 MIN: CPT | Performed by: FAMILY MEDICINE

## 2021-10-22 RX ORDER — PREDNISONE 20 MG/1
60 TABLET ORAL DAILY
Qty: 15 TABLET | Refills: 0 | Status: SHIPPED | OUTPATIENT
Start: 2021-10-22 | End: 2021-10-27

## 2021-10-22 ASSESSMENT — PAIN SCALES - GENERAL: PAINLEVEL: NO PAIN (0)

## 2021-10-22 ASSESSMENT — MIFFLIN-ST. JEOR: SCORE: 1608.24

## 2021-10-22 NOTE — PATIENT INSTRUCTIONS
Patient Education     Duong s Palsy    Bell's Palsy is a problem involving the nerve that controls the muscles on one side of the face.  In most cases, the cause is unknown, but may be related to inflammation of the nerve, diabetes, pregnancy, Lyme disease, and viral infections such as herpes or varicella. Symptoms usually appear only on one side. They may include:    Inability to close the eyelid    Tearing of the eye    Facial drooping    Drooling    Numbness or pain    Changes in taste    Sound sensitivity  Damage to the eye can be a serious problem. The inability to blink can cause the eye to dry out. An ulcer (sore) can then form on the cornea. Also, not blinking means that the eye has no protection from dirt and dust particles.  Treatment involves protecting and moistening the eye. Medicines, such as steroids, may also help.  Most people recover fully within 3 to 6 months. However, the condition sometimes returns months or years later.  Home care    Get plenty of rest and eat a healthy diet to help yourself recover.    Use artificial tears often during the day and at bedtime to prevent drying. These drops are available without prescription at your drug store.    Wear protective glasses especially when outside to protect from flying debris. Use sunglasses when outdoors.    Tape the eyelid closed at bedtime with a paper tape (available at your pharmacy). It has a very mild adhesive so won't injure the lid. This will protect your eye from injury while you sleep.    Sometimes medicines are prescribed to reduce inflammation or treat specific viral infections of the nerve. If medicines are prescribed, take them exactly as directed. Usually the sooner the medicines are started, the more effective they are. Taking this medicine as prescribed will help with a full recovery.    Use low heat, for example from a heating pad, on the affected area. This can help reduce pain and swelling.    If you have severe pain, contact  your healthcare provider.  Follow-up care  Follow up with your healthcare provider as advised. If you referred to a specialist, make that appointment promptly.  When to seek medical advice  Call your healthcare provider if any of the following occur:    Severe eye redness    Eye pain    Thick drainage from the eye    Change in vision (such as double vision or losing vision)    Fever over 100.4 F (38 C) or as directed by your healthcare provider    Headache, neck pain, weakness, trouble speaking or walking, or other unexplained symptoms  Elian last reviewed this educational content on 3/1/2018    2237-0584 [employer]. All rights reserved. This information is not intended as a substitute for professional medical care. Always follow your healthcare professional's instructions.

## 2021-10-27 ENCOUNTER — MYC MEDICAL ADVICE (OUTPATIENT)
Dept: FAMILY MEDICINE | Facility: CLINIC | Age: 71
End: 2021-10-27

## 2021-11-17 ENCOUNTER — MYC MEDICAL ADVICE (OUTPATIENT)
Dept: FAMILY MEDICINE | Facility: CLINIC | Age: 71
End: 2021-11-17
Payer: COMMERCIAL

## 2022-02-03 ENCOUNTER — TELEPHONE (OUTPATIENT)
Dept: FAMILY MEDICINE | Facility: CLINIC | Age: 72
End: 2022-02-03
Payer: COMMERCIAL

## 2022-02-03 NOTE — CONFIDENTIAL NOTE
Reason for call:  Form  Reason for Call:  Form, our goal is to have forms completed with 72 hours, however, some forms may require a visit or additional information.     Type of letter, form or note:  Medical     Who is the form from?: [employer]             Where did the form come from: form was faxed in     What clinic location was the form placed at?: [facility] - [phone]     Where the form was placed: 's in box      What number is listed as a contact on the form?: [phone]       Additional comments: Fax back to [phone]

## 2022-03-11 ENCOUNTER — TELEPHONE (OUTPATIENT)
Dept: FAMILY MEDICINE | Facility: CLINIC | Age: 72
End: 2022-03-11
Payer: COMMERCIAL

## 2022-03-11 NOTE — CONFIDENTIAL NOTE
Reason for Call:  Form, our goal is to have forms completed with 72 hours, however, some forms may require a visit or additional information.     Type of letter, form or note:  Medical     Who is the form from?: [employer]             Where did the form come from: form was faxed in     What clinic location was the form placed at?: [facility] - [phone]     Where the form was placed: 's in box     What number is listed as a contact on the form?: [phone]       Additional comments: Fax back to [phone]

## 2022-03-14 NOTE — TELEPHONE ENCOUNTER
Please contact pt- he dropped off form to be completed.   His last annual exam was March 2021- 1 year ago.  He is due for an annual exam and will be needed to complete form with updated information.  Assist with scheduling.  Lyubov Almanza PA-C

## 2022-03-16 NOTE — PROGRESS NOTES
"SUBJECTIVE:   Aquiles Mckeon is a 71 year old male who presents for Preventive Visit.    Patient has been advised of split billing requirements and indicates understanding: Yes     Are you in the first 12 months of your Medicare coverage?  No    Healthy Habits:     In general, how would you rate your overall health?  Good    Frequency of exercise:  2-3 days/week    Duration of exercise:  15-30 minutes    Do you usually eat at least 4 servings of fruit and vegetables a day, include whole grains    & fiber and avoid regularly eating high fat or \"junk\" foods?  Yes    Taking medications regularly:  Not Applicable    Barriers to taking medications:  None    Medication side effects:  Not applicable    Ability to successfully perform activities of daily living:  No assistance needed    Home Safety:  Throw rugs in the hallway    Hearing Impairment:  No hearing concerns    In the past 6 months, have you been bothered by leaking of urine?  No    In general, how would you rate your overall mental or emotional health?  Excellent      PHQ-2 Total Score: 0    Additional concerns today:  No    Do you feel safe in your environment? Yes    Have you ever done Advance Care Planning? (For example, a Health Directive, POLST, or a discussion with a medical provider or your loved ones about your wishes): No, advance care planning information given to patient to review.  Patient plans to discuss their wishes with loved ones or provider.      Fall risk  Fall Risk Assessment not completed.  click delete button to remove this line now  Cognitive Screening   1) Repeat 3 items (Leader, Season, Table)    2) Clock draw: NORMAL  3) 3 item recall: Recalls 2 objects   Results: NORMAL clock, 1-2 items recalled: COGNITIVE IMPAIRMENT LESS LIKELY    Mini-CogTM Copyright BERNY Cheema. Licensed by the author for use in [facility]; reprinted with permission (sadaf@.[facility]). All rights reserved.      Do you have sleep apnea, excessive snoring or " daytime drowsiness?: no    Reviewed and updated as needed this visit by clinical staff   Tobacco  Allergies  Meds  Problems  Med Hx  Surg Hx  Fam Hx        Reviewed and updated as needed this visit by Provider   Tobacco  Allergies  Meds  Problems  Med Hx  Surg Hx  Fam Hx       social history reviewed.  Social History     Tobacco Use     Smoking status: Former Smoker     Packs/day: 0.80     Years: 40.00     Pack years: 32.00     Types: Cigarettes     Quit date: 2015     Years since quittin.8     Smokeless tobacco: Never Used     Tobacco comment: quit smoking 2015   Substance Use Topics     Alcohol use: Yes     Alcohol/week: 11.7 standard drinks     Types: 14 Standard drinks or equivalent per week     Comment: Beer mainly- 5-6 per week     If you drink alcohol do you typically have >3 drinks per day or >7 drinks per week? No    Alcohol Use 3/17/2022   Prescreen: >3 drinks/day or >7 drinks/week? No   Prescreen: >3 drinks/day or >7 drinks/week? -   No flowsheet data found.    Current providers sharing in care for this patient include:    Patient Care Team:  Lyubov Almanza PA-C as PCP - General (Physician Assistant - Medical)  Lyubov Almanza PA-C as Assigned PCP  Rosalio Ramirez DPM as Assigned Musculoskeletal Provider    The following health maintenance items are reviewed in Epic and correct as of today:  Health Maintenance Due   Topic Date Due     LUNG CANCER SCREENING  Never done     COLORECTAL CANCER SCREENING  2021     COVID-19 Vaccine (3 - Booster for Pfizer series) 2021     INFLUENZA VACCINE (1) 2021     LIPID  2022     FALL RISK ASSESSMENT  2022     Lab work is in process    Review of Systems   Constitutional: Negative for chills and fever.   HENT: Negative for congestion, ear pain, hearing loss and sore throat.    Eyes: Negative for pain and visual disturbance.   Respiratory: Negative for cough and shortness of breath.    Cardiovascular:  "Negative for chest pain, palpitations and peripheral edema.   Gastrointestinal: Negative for abdominal pain, constipation, diarrhea, heartburn, hematochezia and nausea.   Genitourinary: Positive for impotence. Negative for dysuria, frequency, genital sores, hematuria, penile discharge and urgency.   Musculoskeletal: Negative for arthralgias, joint swelling and myalgias.   Skin: Negative for rash.   Neurological: Negative for dizziness, weakness, headaches and paresthesias.   Psychiatric/Behavioral: Negative for mood changes. The patient is not nervous/anxious.      OBJECTIVE:   /82   Pulse 81   Temp 98.5  F (36.9  C) (Temporal)   Resp 16   Ht 1.67 m (5' 5.75\")   Wt 88.7 kg (195 lb 8 oz)   SpO2 97%   BMI 31.79 kg/m   Estimated body mass index is 31.79 kg/m  as calculated from the following:    Height as of this encounter: 1.67 m (5' 5.75\").    Weight as of this encounter: 88.7 kg (195 lb 8 oz).  Physical Exam  GENERAL: healthy, alert and no distress  EYES: Eyes grossly normal to inspection, PERRL and conjunctivae and sclerae normal  HENT: ear canals and TM's normal, nose and mouth without ulcers or lesions  NECK: no adenopathy, no asymmetry, masses, or scars and thyroid normal to palpation  RESP: lungs clear to auscultation - no rales, rhonchi or wheezes  CV: regular rate and rhythm, normal S1 S2, no S3 or S4, no murmur, click or rub, no peripheral edema and peripheral pulses strong  ABDOMEN: soft, nontender, no hepatosplenomegaly, no masses and bowel sounds normal  MS: no gross musculoskeletal defects noted, no edema  SKIN: no suspicious lesions or rashes  NEURO: Normal strength and tone, mentation intact and speech normal  PSYCH: mentation appears normal, affect normal/bright    Labs reviewed in Epic    ASSESSMENT / PLAN:   1. Encounter for Medicare annual wellness exam: routine screening for diabetes, hyperlipidemia, and anemia. Discussed prostate cancer screening options. Based on education and " "discussion, pt agreeable to PSA screening.   - Lipid panel reflex to direct LDL Fasting; Future  - Hemoglobin A1c; Future  - Comprehensive metabolic panel (BMP + Alb, Alk Phos, ALT, AST, Total. Bili, TP); Future  - CBC with platelets; Future  - PSA, screen; Future    2. Screen for colon cancer: routine screening. Last colonoscopy in 2016, 5-7 year recommended follow-up due to multiple polyps. Referral to gastroenterology placed. Patient agreeable to plan.   - Adult Gastro Ref - Procedure Only; Future    3. Hyperlipidemia with target LDL less than 160: routine lipid screening. Not on cholesterol lowering medication.   - Lipid panel reflex to direct LDL Fasting    4. Erectile dysfunction, unspecified erectile dysfunction type: ongoing erectile dysfunction concerns. Initiating Cialis based on concern and symptoms.  Discussed proper use and side effects.  Encouraged him to follow-up with [employer] in terms of donation of plasma on this medication.  Discussed GoodRx coupon.  - tadalafil (CIALIS) 5 MG tablet; Take 1 tablet (5 mg) by mouth daily  Dispense: 30 tablet; Refill: 0      COUNSELING:  Reviewed preventive health counseling, as reflected in patient instructions       Regular exercise       Healthy diet/nutrition       Vision screening       Hearing screening       Dental care       Fall risk prevention       Alcohol Use        Consider lung cancer screening for ages 55-80 years (77 for Medicare) and 20 pack-year smoking history        Colon cancer screening       Prostate cancer screening    Estimated body mass index is 31.79 kg/m  as calculated from the following:    Height as of this encounter: 1.67 m (5' 5.75\").    Weight as of this encounter: 88.7 kg (195 lb 8 oz).    Weight management plan: Discussed healthy diet and exercise guidelines    He reports that he quit smoking about 6 years ago. His smoking use included cigarettes. He has a 32.00 pack-year smoking history. He has never used smokeless " tobacco.    Appropriate preventive services were discussed with this patient, including applicable screening as appropriate for cardiovascular disease, diabetes, osteopenia/osteoporosis, and glaucoma.  As appropriate for age/gender, discussed screening for colorectal cancer, prostate cancer, breast cancer, and cervical cancer. Checklist reviewing preventive services available has been given to the patient.    Reviewed patients plan of care and provided an AVS. The Basic Care Plan (routine screening as documented in Health Maintenance) for Aquiles meets the Care Plan requirement. This Care Plan has been established and reviewed with the Patient.    Counseling Resources:  ATP IV Guidelines  Pooled Cohorts Equation Calculator  Breast Cancer Risk Calculator  Breast Cancer: Medication to Reduce Risk  FRAX Risk Assessment  ICSI Preventive Guidelines  Dietary Guidelines for Americans, 2010  Laura Sapiens's MyPlate  ASA Prophylaxis  Lung CA Screening    Marty Oliveros MD  [facility]    This chart is completed utilizing dictation software; typos and/or incorrect word substitutions may unintentionally occur.    Identified Health Risks:

## 2022-03-17 ENCOUNTER — OFFICE VISIT (OUTPATIENT)
Dept: FAMILY MEDICINE | Facility: CLINIC | Age: 72
End: 2022-03-17
Payer: COMMERCIAL

## 2022-03-17 VITALS
RESPIRATION RATE: 16 BRPM | OXYGEN SATURATION: 97 % | SYSTOLIC BLOOD PRESSURE: 124 MMHG | BODY MASS INDEX: 31.42 KG/M2 | WEIGHT: 195.5 LBS | TEMPERATURE: 98.5 F | HEIGHT: 66 IN | DIASTOLIC BLOOD PRESSURE: 82 MMHG | HEART RATE: 81 BPM

## 2022-03-17 DIAGNOSIS — N52.9 ERECTILE DYSFUNCTION, UNSPECIFIED ERECTILE DYSFUNCTION TYPE: ICD-10-CM

## 2022-03-17 DIAGNOSIS — E78.5 HYPERLIPIDEMIA WITH TARGET LDL LESS THAN 160: ICD-10-CM

## 2022-03-17 DIAGNOSIS — Z12.11 SCREEN FOR COLON CANCER: ICD-10-CM

## 2022-03-17 DIAGNOSIS — Z00.00 ENCOUNTER FOR MEDICARE ANNUAL WELLNESS EXAM: Primary | ICD-10-CM

## 2022-03-17 LAB
ALBUMIN SERPL-MCNC: 3.5 G/DL (ref 3.4–5)
ALP SERPL-CCNC: 65 U/L (ref 40–150)
ALT SERPL W P-5'-P-CCNC: 28 U/L (ref 0–70)
ANION GAP SERPL CALCULATED.3IONS-SCNC: 4 MMOL/L (ref 3–14)
AST SERPL W P-5'-P-CCNC: 17 U/L (ref 0–45)
BILIRUB SERPL-MCNC: 0.5 MG/DL (ref 0.2–1.3)
BUN SERPL-MCNC: 16 MG/DL (ref 7–30)
CALCIUM SERPL-MCNC: 9.1 MG/DL (ref 8.5–10.1)
CHLORIDE BLD-SCNC: 108 MMOL/L (ref 94–109)
CHOLEST SERPL-MCNC: 170 MG/DL
CO2 SERPL-SCNC: 27 MMOL/L (ref 20–32)
CREAT SERPL-MCNC: 0.93 MG/DL (ref 0.66–1.25)
ERYTHROCYTE [DISTWIDTH] IN BLOOD BY AUTOMATED COUNT: 13.6 % (ref 10–15)
FASTING STATUS PATIENT QL REPORTED: NO
GFR SERPL CREATININE-BSD FRML MDRD: 88 ML/MIN/1.73M2
GLUCOSE BLD-MCNC: 95 MG/DL (ref 70–99)
HBA1C MFR BLD: 5.9 % (ref 0–5.6)
HCT VFR BLD AUTO: 47.6 % (ref 40–53)
HDLC SERPL-MCNC: 52 MG/DL
HGB BLD-MCNC: 15.3 G/DL (ref 13.3–17.7)
LDLC SERPL CALC-MCNC: 88 MG/DL
MCH RBC QN AUTO: 29.5 PG (ref 26.5–33)
MCHC RBC AUTO-ENTMCNC: 32.1 G/DL (ref 31.5–36.5)
MCV RBC AUTO: 92 FL (ref 78–100)
NONHDLC SERPL-MCNC: 118 MG/DL
PLATELET # BLD AUTO: 260 10E3/UL (ref 150–450)
POTASSIUM BLD-SCNC: 4.4 MMOL/L (ref 3.4–5.3)
PROT SERPL-MCNC: 6.7 G/DL (ref 6.8–8.8)
PSA SERPL-MCNC: 4.3 UG/L (ref 0–4)
RBC # BLD AUTO: 5.19 10E6/UL (ref 4.4–5.9)
SODIUM SERPL-SCNC: 139 MMOL/L (ref 133–144)
TRIGL SERPL-MCNC: 152 MG/DL
WBC # BLD AUTO: 8.6 10E3/UL (ref 4–11)

## 2022-03-17 PROCEDURE — 83036 HEMOGLOBIN GLYCOSYLATED A1C: CPT | Performed by: FAMILY MEDICINE

## 2022-03-17 PROCEDURE — 99213 OFFICE O/P EST LOW 20 MIN: CPT | Mod: 25 | Performed by: FAMILY MEDICINE

## 2022-03-17 PROCEDURE — G0103 PSA SCREENING: HCPCS | Performed by: FAMILY MEDICINE

## 2022-03-17 PROCEDURE — 99397 PER PM REEVAL EST PAT 65+ YR: CPT | Performed by: FAMILY MEDICINE

## 2022-03-17 PROCEDURE — 80053 COMPREHEN METABOLIC PANEL: CPT | Performed by: FAMILY MEDICINE

## 2022-03-17 PROCEDURE — 85027 COMPLETE CBC AUTOMATED: CPT | Performed by: FAMILY MEDICINE

## 2022-03-17 PROCEDURE — 80061 LIPID PANEL: CPT | Performed by: FAMILY MEDICINE

## 2022-03-17 PROCEDURE — 36415 COLL VENOUS BLD VENIPUNCTURE: CPT | Performed by: FAMILY MEDICINE

## 2022-03-17 RX ORDER — TADALAFIL 5 MG/1
5 TABLET ORAL DAILY
Qty: 30 TABLET | Refills: 0 | Status: SHIPPED | OUTPATIENT
Start: 2022-03-17 | End: 2023-02-20

## 2022-03-17 ASSESSMENT — ENCOUNTER SYMPTOMS
FEVER: 0
NERVOUS/ANXIOUS: 0
WEAKNESS: 0
SHORTNESS OF BREATH: 0
DIZZINESS: 0
CHILLS: 0
PALPITATIONS: 0
DYSURIA: 0
HEMATOCHEZIA: 0
ARTHRALGIAS: 0
COUGH: 0
NAUSEA: 0
CONSTIPATION: 0
PARESTHESIAS: 0
DIARRHEA: 0
SORE THROAT: 0
MYALGIAS: 0
HEARTBURN: 0
EYE PAIN: 0
FREQUENCY: 0
ABDOMINAL PAIN: 0
HEADACHES: 0
HEMATURIA: 0
JOINT SWELLING: 0

## 2022-03-17 ASSESSMENT — ACTIVITIES OF DAILY LIVING (ADL): CURRENT_FUNCTION: NO ASSISTANCE NEEDED

## 2022-03-17 NOTE — RESULT ENCOUNTER NOTE
Please inform of results if patient has not viewed in [employer].    Your pre-diabetes screening test is stable at 5.9. Continue to work on diet and exercise changes.    Your cell count lab results came back normal.    Your other labs are pending still.    Please call the clinic with any questions you may have.     Have a great day,    Dr. Cardoza

## 2022-03-18 ENCOUNTER — TELEPHONE (OUTPATIENT)
Dept: GASTROENTEROLOGY | Facility: CLINIC | Age: 72
End: 2022-03-18
Payer: COMMERCIAL

## 2022-03-18 DIAGNOSIS — Z11.59 ENCOUNTER FOR SCREENING FOR OTHER VIRAL DISEASES: Primary | ICD-10-CM

## 2022-03-18 DIAGNOSIS — R97.20 ELEVATED PROSTATE SPECIFIC ANTIGEN (PSA): Primary | ICD-10-CM

## 2022-03-18 NOTE — RESULT ENCOUNTER NOTE
Please inform of results if patient has not viewed in [employer].    Your cholesterol lab results came back normal.    Your kidney function, liver function, blood sugar, and electrolytes were normal.    Your prostate test was slightly elevated this year. I recommend we repeat this in 6 weeks. If elevated at that time we can have you see urology. I have ordered this lab test. Please make a lab only visit.    Please call the clinic with any questions you may have.     Have a great day,    Dr. Cardoza

## 2022-03-18 NOTE — TELEPHONE ENCOUNTER
Screening Questions  BlueKIND OF PREP RedLOCATION [review exclusion criteria] GreenSEDATION TYPE  1. Have you had a positive covid test in the last 90 days? N     2. Are you active on mychart? Y    3. What insurance is in the chart? [employer]     3.   Ordering/Referring Provider: Marty Oliveros     4. BMI 31.79 [BMI OVER 40-EXTENDED PREP]  If greater than 40 review exclusion criteria [PAC APPT IF @ [facility]]        5.  Respiratory Screening :  [If yes to any of the following HOSPITAL setting only]     Do you use daily home oxygen? N    Do you have mod to severe Obstructive Sleep Apnea? N  [OKAY @ [facility] SH PH RI]   Do you have Pulmonary Hypertension? N     Do you have UNCONTROLLED asthma? N        6.   Have you had a heart or lung transplant? N      7.   Are you currently on dialysis? N [ If yes, G-PREP & HOSPITAL setting only]     8.   Do you have chronic kidney disease? N [ If yes, G-PREP ]    9.   Have you had a stroke or Transient ischemic attack (TIA - aka  mini stroke ) within 6 months?  N (If yes, please review exclusion criteria)    10.   In the past 6 months, have you had any heart related issues including cardiomyopathy or heart attack? N           If yes, did it require cardiac stenting or other implantable device? N      11.   Do you have any implantable devices in your body (pacemaker, defib, LVAD)? N (If yes, please review exclusion criteria)    12.   Do you take nitroglycerin? N           If yes, how often? N  (if yes, HOSPITAL setting ONLY)    13.   Are you currently taking any blood thinners? N           [IF YES, INFORM PATIENT TO FOLLOW UP W/ ORDERING PROVIDER FOR BRIDGING INSTRUCTIONS]     14.   Do you have a diagnosis of diabetes? PRE-DIABETES   [ If yes, G-PREP ]    15.   [FEMALES] Are you currently pregnant?     If yes, how many weeks?     16.   Are you taking any prescription pain medications on a routine schedule?  N  [ If yes, EXTENDED PREP.] [If yes, MAC]    17.   Do you have any  chemical dependencies such as alcohol, street drugs, or methadone?  N [If yes, MAC]    18.   Do you have any history of post-traumatic stress syndrome, severe anxiety or history of psychosis?  N  [If yes, MAC]    19.   Do you have a significant intellectual disability?  N [If yes, MAC]    20.   Do you transfer independently?  Y    21.  On a regular basis do you go 3-5 days between bowel movements? N   [ If yes, EXTENDED PREP.]    22.   Preferred LOCAL Pharmacy for Pre Prescription     [id number] [place] - [id number] [facility] AT [facility]        Scheduling Details      Caller : [facility] SCHEDULING   (Please ask for phone number if not scheduled by patient)    Type of Procedure Scheduled: colon  Which Colonoscopy Prep was Sent?: branden [facility]ULLOUGH CF PATIENTS & GROEN'S PATIENTS NEEDS EXTENDED PREP  Surgeon: Oral  Date of Procedure: 4/19/22  Location: [facility]      Sedation Type: cs  Conscious Sedation- Needs  for 6 hours after the procedure  MAC/General-Needs  for 24 hours after procedure    Pre-op Required at [facility] [place], [place] and OR for MAC sedation:   (advise patient they will need a pre-op prior to procedure -)      Informed patient they will need an adult  y  Cannot take any type of public or medical transportation alone    Pre-Procedure Covid test to be [facility] or Externally: Andrea 4/15    Confirmed Nurse will call to complete assessment y    Additional comments:

## 2022-04-15 ENCOUNTER — LAB (OUTPATIENT)
Dept: LAB | Facility: CLINIC | Age: 72
End: 2022-04-15
Payer: COMMERCIAL

## 2022-04-15 DIAGNOSIS — Z11.59 ENCOUNTER FOR SCREENING FOR OTHER VIRAL DISEASES: ICD-10-CM

## 2022-04-15 LAB — SARS-COV-2 RNA RESP QL NAA+PROBE: NEGATIVE

## 2022-04-15 PROCEDURE — U0005 INFEC AGEN DETEC AMPLI PROBE: HCPCS

## 2022-04-15 PROCEDURE — U0003 INFECTIOUS AGENT DETECTION BY NUCLEIC ACID (DNA OR RNA); SEVERE ACUTE RESPIRATORY SYNDROME CORONAVIRUS 2 (SARS-COV-2) (CORONAVIRUS DISEASE [COVID-19]), AMPLIFIED PROBE TECHNIQUE, MAKING USE OF HIGH THROUGHPUT TECHNOLOGIES AS DESCRIBED BY CMS-2020-01-R: HCPCS

## 2022-04-19 ENCOUNTER — HOSPITAL ENCOUNTER (OUTPATIENT)
Facility: AMBULATORY SURGERY CENTER | Age: 72
Discharge: HOME | End: 2022-04-19
Attending: SURGERY | Admitting: SURGERY
Payer: COMMERCIAL

## 2022-04-19 VITALS
DIASTOLIC BLOOD PRESSURE: 76 MMHG | WEIGHT: 188 LBS | HEART RATE: 79 BPM | HEIGHT: 66 IN | RESPIRATION RATE: 16 BRPM | OXYGEN SATURATION: 94 % | SYSTOLIC BLOOD PRESSURE: 112 MMHG | TEMPERATURE: 97 F | BODY MASS INDEX: 30.22 KG/M2

## 2022-04-19 LAB — COLONOSCOPY: NORMAL

## 2022-04-19 PROCEDURE — G8907 PT DOC NO EVENTS ON DISCHARG: HCPCS

## 2022-04-19 PROCEDURE — 45385 COLONOSCOPY W/LESION REMOVAL: CPT

## 2022-04-19 PROCEDURE — 88305 TISSUE EXAM BY PATHOLOGIST: CPT | Performed by: PATHOLOGY

## 2022-04-19 PROCEDURE — 45385 COLONOSCOPY W/LESION REMOVAL: CPT | Mod: PT | Performed by: SURGERY

## 2022-04-19 PROCEDURE — G0500 MOD SEDAT ENDO SERVICE >5YRS: HCPCS | Mod: PT | Performed by: SURGERY

## 2022-04-19 PROCEDURE — G8918 PT W/O PREOP ORDER IV AB PRO: HCPCS

## 2022-04-19 RX ORDER — NALOXONE HYDROCHLORIDE 0.4 MG/ML
0.2 INJECTION, SOLUTION INTRAMUSCULAR; INTRAVENOUS; SUBCUTANEOUS
Status: CANCELLED | OUTPATIENT
Start: 2022-04-19

## 2022-04-19 RX ORDER — FENTANYL CITRATE 50 UG/ML
INJECTION, SOLUTION INTRAMUSCULAR; INTRAVENOUS PRN
Status: DISCONTINUED | OUTPATIENT
Start: 2022-04-19 | End: 2022-04-19 | Stop reason: HOSPADM

## 2022-04-19 RX ORDER — NALOXONE HYDROCHLORIDE 0.4 MG/ML
0.4 INJECTION, SOLUTION INTRAMUSCULAR; INTRAVENOUS; SUBCUTANEOUS
Status: CANCELLED | OUTPATIENT
Start: 2022-04-19

## 2022-04-19 RX ORDER — LIDOCAINE 40 MG/G
CREAM TOPICAL
Status: DISCONTINUED | OUTPATIENT
Start: 2022-04-19 | End: 2022-04-20 | Stop reason: HOSPADM

## 2022-04-19 RX ORDER — ONDANSETRON 4 MG/1
4 TABLET, ORALLY DISINTEGRATING ORAL EVERY 6 HOURS PRN
Status: CANCELLED | OUTPATIENT
Start: 2022-04-19

## 2022-04-19 RX ORDER — FLUMAZENIL 0.1 MG/ML
0.2 INJECTION, SOLUTION INTRAVENOUS
Status: CANCELLED | OUTPATIENT
Start: 2022-04-19 | End: 2022-04-19

## 2022-04-19 RX ORDER — ONDANSETRON 2 MG/ML
4 INJECTION INTRAMUSCULAR; INTRAVENOUS
Status: DISCONTINUED | OUTPATIENT
Start: 2022-04-19 | End: 2022-04-20 | Stop reason: HOSPADM

## 2022-04-19 RX ORDER — ONDANSETRON 2 MG/ML
4 INJECTION INTRAMUSCULAR; INTRAVENOUS EVERY 6 HOURS PRN
Status: CANCELLED | OUTPATIENT
Start: 2022-04-19

## 2022-04-19 RX ORDER — PROCHLORPERAZINE MALEATE 5 MG
5 TABLET ORAL EVERY 6 HOURS PRN
Status: CANCELLED | OUTPATIENT
Start: 2022-04-19

## 2022-04-19 NOTE — H&P
Patient seen for Endoscopy    HPI:  Patient is a 72 year old male with personal history of polyps. Not taking blood thinning medications. No MI or CVA history. No issues with previous sedation. No recent acute illness.    Review Of Systems    Skin: negative  Ears/Nose/Throat: negative  Respiratory: No shortness of breath, dyspnea on exertion, cough, or hemoptysis  Cardiovascular: negative  Gastrointestinal: negative  Genitourinary: negative  Musculoskeletal: negative  Neurologic: negative  Hematologic/Lymphatic/Immunologic: negative  Endocrine: negative      Past Medical History:   Diagnosis Date     ED (erectile dysfunction)      Hx of colonic polyp        Past Surgical History:   Procedure Laterality Date     COLONOSCOPY      Reported as having a polyp removed     COLONOSCOPY      Clear by report     COLONOSCOPY N/A 2016    Procedure: COLONOSCOPY;  Surgeon: Juanjo Martínez MD;  Location: [facility] OR     COLONOSCOPY WITH CO2 INSUFFLATION N/A 2016    Procedure: COLONOSCOPY WITH CO2 INSUFFLATION;  Surgeon: Juanjo Martínez MD;  Location: [facility] OR       Family History   Problem Relation Age of Onset     Cervical Cancer Mother      Lung Cancer Mother      Heart Disease Father         CHF     Emphysema Father      Diabetes No family hx of      Hypertension No family hx of      Breast Cancer No family hx of      Prostate Cancer No family hx of      Mental Illness No family hx of      Osteoporosis No family hx of      Obesity No family hx of        Social History     Socioeconomic History     Marital status:      Spouse name: Not on file     Number of children: Not on file     Years of education: Not on file     Highest education level: Not on file   Occupational History     Not on file   Tobacco Use     Smoking status: Former Smoker     Packs/day: 0.80     Years: 40.00     Pack years: 32.00     Types: Cigarettes     Quit date: 2015     Years since quittin.9     Smokeless  "tobacco: Never Used     Tobacco comment: quit smoking 5/01/2015   Vaping Use     Vaping Use: Never used   Substance and Sexual Activity     Alcohol use: Yes     Alcohol/week: 11.7 standard drinks     Types: 14 Standard drinks or equivalent per week     Comment: Beer mainly- 5-6 per week     Drug use: No     Sexual activity: Yes     Partners: Female     Comment: not currently   Other Topics Concern     Parent/sibling w/ CABG, MI or angioplasty before 65F 55M? No   Social History Narrative     Not on file     Social Determinants of Health     Financial Resource Strain: Not on file   Food Insecurity: Not on file   Transportation Needs: Not on file   Physical Activity: Not on file   Stress: Not on file   Social Connections: Not on file   Intimate Partner Violence: Not on file   Housing Stability: Not on file       Current Outpatient Medications   Medication Sig Dispense Refill     tadalafil (CIALIS) 5 MG tablet Take 1 tablet (5 mg) by mouth daily 30 tablet 0       Medications and history reviewed    Physical exam:  Vitals: /85   Pulse 79   Temp 97  F (36.1  C) (Temporal)   Resp 16   Ht 1.67 m (5' 5.75\")   Wt 85.3 kg (188 lb)   SpO2 93%   BMI 30.58 kg/m    BMI= Body mass index is 30.58 kg/m .    Constitutional: Healthy, alert, non-distressed   Head: Normo-cephalic, atraumatic, no lesions, masses or tenderness   Cardiovascular: RRR, no new murmurs, +S1, +S2 heart sounds, no clicks, rubs or gallops   Respiratory: CTAB, no rales, rhonchi or wheezing, equal chest rise, good respiratory effort   Gastrointestinal: Soft, non-tender, non distended, no rebound rigidity or guarding, no masses or hernias palpated   : Deferred  Musculoskeletal: Moves all extremities, normal  strength, no deformities noted   Skin: No suspicious lesions or rashes   Psychiatric: Mentation appears normal, affect appropriate   Hematologic/Lymphatic/Immunologic: Normal cervical and supraclavicular lymph nodes   Patient able to get up on " table without difficulty.    Labs show:  Results for orders placed or performed during the hospital encounter of 04/19/22 (from the past 24 hour(s))   COLONOSCOPY   Result Value Ref Range    COLONOSCOPY       [facility]  Endoscopy Department-[place]  _______________________________________________________________________________  Patient Name: Aquiles Mckeon           Procedure Date: 4/19/2022 9:24 AM  MRN: [id number]                       YOB: 1950  Admit Type: Outpatient                Age: 72  Gender: Male                          Note Status: Finalized  Attending MD: ROSALINE BLANCA MD  Instrument Name: [id number]  _______________________________________________________________________________     Procedure:                Colonoscopy  Indications:              High risk colon cancer surveillance: Personal                             history of colonic polyps  Providers:                ROSALINE BLANCA MD  Referring MD:             KATELYNN HADDADAHRNDT  Medicines:                Midazolam 2 mg IV, Fentanyl 100 micrograms IV  Complications:            No immediate complications.  ________________________________ _______________________________________________  Procedure:                Pre-Anesthesia Assessment:                            - Prior to the procedure, a History and Physical                             was performed, and patient medications, allergies                             and sensitivities were reviewed. The patient's                             tolerance of previous anesthesia was reviewed.                            - The risks and benefits of the procedure and the                             sedation options and risks were discussed with the                             patient. All questions were answered and informed                             consent was obtained.                            - After reviewing the  risks and benefits, the                             patient was deemed in satisfactory condition to                             undergo the procedure.                            After obtaining informed consent, the colonoscope                             was p assed under direct vision. Throughout the                             procedure, the patient's blood pressure, pulse, and                             oxygen saturations were monitored continuously. The                             was introduced through the anus and advanced to the                             cecum, identified by appendiceal orifice and                             ileocecal valve. The colonoscopy was performed                             without difficulty. The patient tolerated the                             procedure well. The quality of the bowel                             preparation was good.                                                                                   Findings:       The perianal and digital rectal examinations were normal.       Two sessile polyps were found in the ascending colon. The polyps were 3        to 4 mm in size. These polyps were removed with a cold snare. Resection        and retrieval were complete.       The exam was otherwise  without abnormality.                                                                                   Moderate Sedation:       Moderate (conscious) sedation was administered by the endoscopy nurse        and supervised by the endoscopist. The patient's oxygen saturation,        heart rate, blood pressure and response to care were monitored. Total        physician intraservice time was 13 minutes.       An independent trained observer was present and continuously monitored        the patient.  Impression:               - Two 3 to 4 mm polyps in the ascending colon,                             removed with a cold snare. Resected and retrieved.                            - The  examination was otherwise normal.  Recommendation:           - Discharge patient to home.                            - High fiber diet and low fat diet.                            - Continue present medications.                            - Await pathology results.                            - Repeat colonoscopy f or surveillance based on                             pathology results.                                                                                     __________________________  JIAN MIXON MD  4/19/2022 9:53:18 AM  Number of Addenda: 0    Note Initiated On: 4/19/2022 9:24 AM         Assessment: Endoscopy  Plan: Pt cleared for anesthesia for proposed procedure.    Jian Mixon DO

## 2022-04-19 NOTE — LETTER
Aquiles Mckeon  [id number] [address] [address]    April 21, 2022      Dear Aquiles,  This letter is to inform you of the results of your pathology report from your colonoscopy.  Your pathology report was:  Final Diagnosis   A.  ASCENDING COLON POLYPS X 2, POLYPECTOMY:  - Two tubular adenomas; negative for high-grade dysplasia   These are benign polyps. These types of polyps do carry a small risk of developing into a cancer over time if not removed. Yours were completely removed at the time of your colonoscopy. You should have another surveillance colonoscopy in 5 years.  If you have further questions please don t hesitate to call our clinic at [phone].   Sincerely,     Jian Mixon, DO

## 2022-04-21 LAB
PATH REPORT.COMMENTS IMP SPEC: NORMAL
PATH REPORT.FINAL DX SPEC: NORMAL
PATH REPORT.GROSS SPEC: NORMAL
PATH REPORT.MICROSCOPIC SPEC OTHER STN: NORMAL
PATH REPORT.RELEVANT HX SPEC: NORMAL
PHOTO IMAGE: NORMAL

## 2022-09-10 ENCOUNTER — HEALTH MAINTENANCE LETTER (OUTPATIENT)
Age: 72
End: 2022-09-10

## 2023-02-20 ENCOUNTER — OFFICE VISIT (OUTPATIENT)
Dept: FAMILY MEDICINE | Facility: CLINIC | Age: 73
End: 2023-02-20
Payer: COMMERCIAL

## 2023-02-20 ENCOUNTER — MYC MEDICAL ADVICE (OUTPATIENT)
Dept: FAMILY MEDICINE | Facility: CLINIC | Age: 73
End: 2023-02-20

## 2023-02-20 ENCOUNTER — TELEPHONE (OUTPATIENT)
Dept: FAMILY MEDICINE | Facility: CLINIC | Age: 73
End: 2023-02-20

## 2023-02-20 VITALS
RESPIRATION RATE: 12 BRPM | WEIGHT: 199 LBS | SYSTOLIC BLOOD PRESSURE: 118 MMHG | BODY MASS INDEX: 31.98 KG/M2 | HEART RATE: 64 BPM | HEIGHT: 66 IN | OXYGEN SATURATION: 97 % | DIASTOLIC BLOOD PRESSURE: 70 MMHG | TEMPERATURE: 96.9 F

## 2023-02-20 DIAGNOSIS — N39.43 BENIGN PROSTATIC HYPERPLASIA (BPH) WITH POST-VOID DRIBBLING: ICD-10-CM

## 2023-02-20 DIAGNOSIS — R97.20 ELEVATED PROSTATE SPECIFIC ANTIGEN (PSA): ICD-10-CM

## 2023-02-20 DIAGNOSIS — N40.1 BENIGN PROSTATIC HYPERPLASIA (BPH) WITH POST-VOID DRIBBLING: ICD-10-CM

## 2023-02-20 DIAGNOSIS — Z52.008 PLASMA DONOR: ICD-10-CM

## 2023-02-20 DIAGNOSIS — Z13.220 SCREENING FOR HYPERLIPIDEMIA: ICD-10-CM

## 2023-02-20 DIAGNOSIS — Z00.00 ROUTINE HISTORY AND PHYSICAL EXAMINATION OF ADULT: Primary | ICD-10-CM

## 2023-02-20 DIAGNOSIS — Z87.891 PERSONAL HISTORY OF TOBACCO USE: ICD-10-CM

## 2023-02-20 DIAGNOSIS — R73.01 IMPAIRED FASTING GLUCOSE: ICD-10-CM

## 2023-02-20 LAB
ALBUMIN SERPL-MCNC: 3.8 G/DL (ref 3.4–5)
ALP SERPL-CCNC: 73 U/L (ref 40–150)
ALT SERPL W P-5'-P-CCNC: 33 U/L (ref 0–70)
ANION GAP SERPL CALCULATED.3IONS-SCNC: 5 MMOL/L (ref 3–14)
AST SERPL W P-5'-P-CCNC: 20 U/L (ref 0–45)
BILIRUB SERPL-MCNC: 0.4 MG/DL (ref 0.2–1.3)
BUN SERPL-MCNC: 19 MG/DL (ref 7–30)
CALCIUM SERPL-MCNC: 9.4 MG/DL (ref 8.5–10.1)
CHLORIDE BLD-SCNC: 109 MMOL/L (ref 94–109)
CHOLEST SERPL-MCNC: 198 MG/DL
CO2 SERPL-SCNC: 28 MMOL/L (ref 20–32)
CREAT SERPL-MCNC: 0.94 MG/DL (ref 0.66–1.25)
ERYTHROCYTE [DISTWIDTH] IN BLOOD BY AUTOMATED COUNT: 12.6 % (ref 10–15)
FASTING STATUS PATIENT QL REPORTED: YES
GFR SERPL CREATININE-BSD FRML MDRD: 87 ML/MIN/1.73M2
GLUCOSE BLD-MCNC: 94 MG/DL (ref 70–99)
HBA1C MFR BLD: 6 % (ref 0–5.6)
HCT VFR BLD AUTO: 47.8 % (ref 40–53)
HDLC SERPL-MCNC: 60 MG/DL
HGB BLD-MCNC: 15.8 G/DL (ref 13.3–17.7)
LDLC SERPL CALC-MCNC: 98 MG/DL
MCH RBC QN AUTO: 29.4 PG (ref 26.5–33)
MCHC RBC AUTO-ENTMCNC: 33.1 G/DL (ref 31.5–36.5)
MCV RBC AUTO: 89 FL (ref 78–100)
NONHDLC SERPL-MCNC: 138 MG/DL
PLATELET # BLD AUTO: 287 10E3/UL (ref 150–450)
POTASSIUM BLD-SCNC: 4.7 MMOL/L (ref 3.4–5.3)
PROT SERPL-MCNC: 7.1 G/DL (ref 6.8–8.8)
PSA SERPL-MCNC: 6.74 UG/L (ref 0–4)
RBC # BLD AUTO: 5.37 10E6/UL (ref 4.4–5.9)
SODIUM SERPL-SCNC: 142 MMOL/L (ref 133–144)
TRIGL SERPL-MCNC: 202 MG/DL
WBC # BLD AUTO: 10.3 10E3/UL (ref 4–11)

## 2023-02-20 PROCEDURE — G0296 VISIT TO DETERM LDCT ELIG: HCPCS | Performed by: PHYSICIAN ASSISTANT

## 2023-02-20 PROCEDURE — 99214 OFFICE O/P EST MOD 30 MIN: CPT | Mod: 25 | Performed by: PHYSICIAN ASSISTANT

## 2023-02-20 PROCEDURE — 99397 PER PM REEVAL EST PAT 65+ YR: CPT | Performed by: PHYSICIAN ASSISTANT

## 2023-02-20 PROCEDURE — 36415 COLL VENOUS BLD VENIPUNCTURE: CPT | Performed by: PHYSICIAN ASSISTANT

## 2023-02-20 PROCEDURE — 80061 LIPID PANEL: CPT | Performed by: PHYSICIAN ASSISTANT

## 2023-02-20 PROCEDURE — 85027 COMPLETE CBC AUTOMATED: CPT | Performed by: PHYSICIAN ASSISTANT

## 2023-02-20 PROCEDURE — 83036 HEMOGLOBIN GLYCOSYLATED A1C: CPT | Performed by: PHYSICIAN ASSISTANT

## 2023-02-20 PROCEDURE — G0103 PSA SCREENING: HCPCS | Performed by: PHYSICIAN ASSISTANT

## 2023-02-20 PROCEDURE — 80053 COMPREHEN METABOLIC PANEL: CPT | Performed by: PHYSICIAN ASSISTANT

## 2023-02-20 RX ORDER — TAMSULOSIN HYDROCHLORIDE 0.4 MG/1
0.4 CAPSULE ORAL DAILY
Qty: 90 CAPSULE | Refills: 0 | Status: SHIPPED | OUTPATIENT
Start: 2023-02-20 | End: 2023-05-19

## 2023-02-20 ASSESSMENT — ENCOUNTER SYMPTOMS
ABDOMINAL PAIN: 0
MYALGIAS: 0
SORE THROAT: 0
WEAKNESS: 0
DIARRHEA: 0
NERVOUS/ANXIOUS: 0
JOINT SWELLING: 0
FREQUENCY: 0
PALPITATIONS: 0
EYE PAIN: 0
DIZZINESS: 0
NAUSEA: 0
SHORTNESS OF BREATH: 0
HEMATURIA: 0
HEADACHES: 0
FEVER: 0
PARESTHESIAS: 0
DYSURIA: 0
ARTHRALGIAS: 0
COUGH: 0
HEMATOCHEZIA: 0
CHILLS: 0
HEARTBURN: 0
CONSTIPATION: 0

## 2023-02-20 ASSESSMENT — PAIN SCALES - GENERAL: PAINLEVEL: NO PAIN (0)

## 2023-02-20 ASSESSMENT — ACTIVITIES OF DAILY LIVING (ADL): CURRENT_FUNCTION: NO ASSISTANCE NEEDED

## 2023-02-20 NOTE — PROGRESS NOTES
"SUBJECTIVE:   Jcarlos is a 72 year old who presents for Preventive Visit.  Patient has been advised of split billing requirements and indicates understanding: Yes  Are you in the first 12 months of your Medicare coverage?  No    Healthy Habits:     In general, how would you rate your overall health?  Good    Frequency of exercise:  2-3 days/week    Duration of exercise:  30-45 minutes    Do you usually eat at least 4 servings of fruit and vegetables a day, include whole grains    & fiber and avoid regularly eating high fat or \"junk\" foods?  No    Taking medications regularly:  Yes    Medication side effects:  Not applicable    Ability to successfully perform activities of daily living:  No assistance needed    Home Safety:  Throw rugs in the hallway    Hearing Impairment:  No hearing concerns    In the past 6 months, have you been bothered by leaking of urine? Yes    In general, how would you rate your overall mental or emotional health?  Good      PHQ-2 Total Score: 0    Additional concerns today:  No    Too soon for Medicare Wellness visit.  Last was 03-.   He reports he has a supplemental insurance plan that he thinks covers a physical and would like to proceed.   Declines flu shot.   Had covid . Declines covid booster    PSA was elevated last year. Was advised repeating in 6 weeks. He did not follow up with that. He drinks a lot of water to prepare for his plasma donation. Few drops of urine after voiding while washing hands most days. Does not feel like he could empty a significant amount more of urine. Up at night 3x per night to void. No frequency/urgency that seems out of proportion for water intake for what he needs for plasma donation.     Prediabetes pattern- a1c last year . He is cutting down on the sugars in his diet- quit peanut butter M&Ms. For exercise walking dogs, shoveling snow, golfing. Denies CV sx with exercise.   He is NOT fasting, ate toast, OJ and banana today. Last year lipids " normal except Trig 152.    [employer] form- He donates plasma 2x per week. It is supplemental income for him. He needs a health form completed. He would like that done today. He denies any side effects from donation. Denies dizziness, tiredness, etc.     Hx of smoking. Quit 8 yr ago. 32 pack yr hx in chart. He denies any cough, sputum or respiratory sx. He has not had lung cancer screening.       Fall risk  Fallen 2 or more times in the past year?: No  Any fall with injury in the past year?: No    Cognitive Screening %  1) Repeat 3 items (Leader, Season, Table)    2) Clock draw: NORMAL  3) 3 item recall: Recalls 3 objects  Results: 3 items recalled: COGNITIVE IMPAIRMENT LESS LIKELY    Mini-CogTM Copyright S Anette. Licensed by the author for use in [facility]; reprinted with permission ([email or web address]). All rights reserved.      Do you have sleep apnea, excessive snoring or daytime drowsiness?: no       Reviewed and updated as needed this visit by clinical staff                  Reviewed and updated as needed this visit by Provider                 Social History     Tobacco Use     Smoking status: Former     Packs/day: 0.80     Years: 40.00     Pack years: 32.00     Types: Cigarettes     Quit date: 2015     Years since quittin.8     Passive exposure: Never     Smokeless tobacco: Never     Tobacco comments:     quit smoking 2015   Substance Use Topics     Alcohol use: Yes     Alcohol/week: 6.0 standard drinks     Types: 6 Standard drinks or equivalent per week     Comment: Beer mainly- 5-6 per week     If you drink alcohol do you typically have >3 drinks per day or >7 drinks per week? No    Alcohol Use 2023   Prescreen: >3 drinks/day or >7 drinks/week? No   Prescreen: >3 drinks/day or >7 drinks/week? -         Current providers sharing in care for this patient include:   Patient Care Team:  Lyubov Almanza PA-C as PCP - General (Physician Assistant - Medical)  Marty Oliveros MD  "as Assigned PCP    The following health maintenance items are reviewed in Epic and correct as of today:  Health Maintenance   Topic Date Due     LUNG CANCER SCREENING  Never done     COVID-19 Vaccine (3 - Booster for Pfizer series) 05/20/2021     INFLUENZA VACCINE (1) 09/01/2022     ANNUAL REVIEW OF HM ORDERS  10/22/2022     MEDICARE ANNUAL WELLNESS VISIT  03/17/2023     LIPID  03/17/2023     FALL RISK ASSESSMENT  02/20/2024     DTAP/TDAP/TD IMMUNIZATION (2 - Td or Tdap) 12/03/2025     ADVANCE CARE PLANNING  03/17/2027     COLORECTAL CANCER SCREENING  04/19/2027     PHQ-2 (once per calendar year)  Completed     Pneumococcal Vaccine: 65+ Years  Completed     ZOSTER IMMUNIZATION  Completed     AORTIC ANEURYSM SCREENING (SYSTEM ASSIGNED)  Completed     HEPATITIS C SCREENING  Addressed     IPV IMMUNIZATION  Aged Out     MENINGITIS IMMUNIZATION  Aged Out         Review of Systems   Constitutional: Negative for chills and fever.   HENT: Negative for ear pain and hearing loss.    Eyes: Negative for pain and visual disturbance.   Respiratory: Negative for shortness of breath.    Cardiovascular: Negative for chest pain, palpitations and peripheral edema.   Gastrointestinal: Negative for abdominal pain, constipation, diarrhea, heartburn, hematochezia and nausea.   Genitourinary: Positive for impotence. Negative for dysuria, frequency, genital sores, hematuria and urgency.   Musculoskeletal: Negative for arthralgias, joint swelling and myalgias.   Skin: Negative for rash.   Neurological: Negative for dizziness, weakness, headaches and paresthesias.   Psychiatric/Behavioral: Negative for mood changes. The patient is not nervous/anxious.          OBJECTIVE:   /70   Pulse 64   Temp 96.9  F (36.1  C) (Temporal)   Resp 12   Ht 1.664 m (5' 5.5\")   Wt 90.3 kg (199 lb)   SpO2 97%   BMI 32.61 kg/m   Estimated body mass index is 32.61 kg/m  as calculated from the following:    Height as of this encounter: 1.664 m (5' " "5.5\").    Weight as of this encounter: 90.3 kg (199 lb).  Physical Exam  GENERAL: healthy, alert and no distress  EYES: Eyes grossly normal to inspection, PERRL and conjunctivae and sclerae normal  HENT: ear canals and TM's normal, nose and mouth without ulcers or lesions  NECK: no adenopathy, no asymmetry, masses, or scars and thyroid normal to palpation  RESP: lungs clear to auscultation - no rales, rhonchi or wheezes  CV: regular rate and rhythm, normal S1 S2, no S3 or S4, no murmur, click or rub, no peripheral edema and peripheral pulses strong  ABDOMEN: soft, nontender, no hepatosplenomegaly, no masses and bowel sounds normal  RECTAL: normal sphincter tone, no rectal masses, prostate bottom 1/3 palpated, possibly enlarged, smooth, nontender without nodules or masses  MS: no gross musculoskeletal defects noted, no edema  SKIN: no suspicious lesions or rashes  NEURO: Normal strength and tone, mentation intact and speech normal  PSYCH: mentation appears normal, affect normal/bright  LYMPH: normal ant/post cervical, supraclavicular nodes    Diagnostic Test Results:  Labs reviewed in Epic  Results for orders placed or performed in visit on 02/20/23   Lipid panel reflex to direct LDL Non-fasting     Status: Abnormal   Result Value Ref Range    Cholesterol 198 <200 mg/dL    Triglycerides 202 (H) <150 mg/dL    Direct Measure HDL 60 >=40 mg/dL    LDL Cholesterol Calculated 98 <=100 mg/dL    Non HDL Cholesterol 138 (H) <130 mg/dL    Patient Fasting > 8hrs? Yes     Narrative    Cholesterol  Desirable:  <200 mg/dL    Triglycerides  Normal:  Less than 150 mg/dL  Borderline High:  150-199 mg/dL  High:  200-499 mg/dL  Very High:  Greater than or equal to 500 mg/dL    Direct Measure HDL  Female:  Greater than or equal to 50 mg/dL   Male:  Greater than or equal to 40 mg/dL    LDL Cholesterol  Desirable:  <100mg/dL  Above Desirable:  100-129 mg/dL   Borderline High:  130-159 mg/dL   High:  160-189 mg/dL   Very High:  >= 190 " mg/dL    Non HDL Cholesterol  Desirable:  130 mg/dL  Above Desirable:  130-159 mg/dL  Borderline High:  160-189 mg/dL  High:  190-219 mg/dL  Very High:  Greater than or equal to 220 mg/dL   Comprehensive metabolic panel (BMP + Alb, Alk Phos, ALT, AST, Total. Bili, TP)     Status: Normal   Result Value Ref Range    Sodium 142 133 - 144 mmol/L    Potassium 4.7 3.4 - 5.3 mmol/L    Chloride 109 94 - 109 mmol/L    Carbon Dioxide (CO2) 28 20 - 32 mmol/L    Anion Gap 5 3 - 14 mmol/L    Urea Nitrogen 19 7 - 30 mg/dL    Creatinine 0.94 0.66 - 1.25 mg/dL    Calcium 9.4 8.5 - 10.1 mg/dL    Glucose 94 70 - 99 mg/dL    Alkaline Phosphatase 73 40 - 150 U/L    AST 20 0 - 45 U/L    ALT 33 0 - 70 U/L    Protein Total 7.1 6.8 - 8.8 g/dL    Albumin 3.8 3.4 - 5.0 g/dL    Bilirubin Total 0.4 0.2 - 1.3 mg/dL    GFR Estimate 87 >60 mL/min/1.73m2   PSA, screen     Status: Abnormal   Result Value Ref Range    Prostate Specific Antigen Screen 6.74 (H) 0.00 - 4.00 ug/L   CBC with platelets     Status: Normal   Result Value Ref Range    WBC Count 10.3 4.0 - 11.0 10e3/uL    RBC Count 5.37 4.40 - 5.90 10e6/uL    Hemoglobin 15.8 13.3 - 17.7 g/dL    Hematocrit 47.8 40.0 - 53.0 %    MCV 89 78 - 100 fL    MCH 29.4 26.5 - 33.0 pg    MCHC 33.1 31.5 - 36.5 g/dL    RDW 12.6 10.0 - 15.0 %    Platelet Count 287 150 - 450 10e3/uL   Hemoglobin A1c     Status: Abnormal   Result Value Ref Range    Hemoglobin A1C 6.0 (H) 0.0 - 5.6 %       ASSESSMENT / PLAN:       ICD-10-CM    1. Routine history and physical examination of adult  Z00.00 CBC with platelets     CANCELED: CBC with platelets      2. Plasma donor  Z52.008 CBC with platelets     Comprehensive metabolic panel (BMP + Alb, Alk Phos, ALT, AST, Total. Bili, TP)     Comprehensive metabolic panel (BMP + Alb, Alk Phos, ALT, AST, Total. Bili, TP)     CBC with platelets     Hemoglobin A1c     CBC with platelets     Hemoglobin A1c     CANCELED: CBC with platelets      3. Impaired fasting glucose  R73.01  Hemoglobin A1c     Comprehensive metabolic panel (BMP + Alb, Alk Phos, ALT, AST, Total. Bili, TP)     Comprehensive metabolic panel (BMP + Alb, Alk Phos, ALT, AST, Total. Bili, TP)     CANCELED: Hemoglobin A1c      4. Elevated prostate specific antigen (PSA)  R97.20 PSA, screen     PSA, screen     Adult Urology  Referral      5. Screening for hyperlipidemia  Z13.220 Lipid panel reflex to direct LDL Non-fasting     Comprehensive metabolic panel (BMP + Alb, Alk Phos, ALT, AST, Total. Bili, TP)     Lipid panel reflex to direct LDL Non-fasting     Comprehensive metabolic panel (BMP + Alb, Alk Phos, ALT, AST, Total. Bili, TP)      6. Benign prostatic hyperplasia (BPH) with post-void dribbling  N40.1 tamsulosin (FLOMAX) 0.4 MG capsule    N39.43       7. Personal history of tobacco use  Z87.891 Prof fee: Shared Decision Making for Lung Cancer Screening     CT Chest Lung Cancer Scrn Low Dose wo        1. Routine history and physical examination of adult  Reviewed VS and weight/BMI with pt   Immunizations:  discussed and pt declined  Counseling as below   Health screenings/maintenance per orders/comments below  When applicable based on age, personal hx, fam hx, and RF- counseled on appropriate cancer screenings.     - CBC with platelets; Future    2. Plasma donor  Will complete forms once labs are updated  - CBC with platelets; Future  - Comprehensive metabolic panel (BMP + Alb, Alk Phos, ALT, AST, Total. Bili, TP); Future  - Comprehensive metabolic panel (BMP + Alb, Alk Phos, ALT, AST, Total. Bili, TP)  - CBC with platelets; Future  - Hemoglobin A1c; Future  - CBC with platelets  - Hemoglobin A1c    3. Impaired fasting glucose  Discussed glucose/a1c results  Advised carb controlled diet, portion control, regular exercise, weight management.   Rescreen/monitor every 6-12 months.   Consider nutrition referral.     - Hemoglobin A1c; Future  - Comprehensive metabolic panel (BMP + Alb, Alk Phos, ALT, AST, Total. Bili,  "TP); Future  - Comprehensive metabolic panel (BMP + Alb, Alk Phos, ALT, AST, Total. Bili, TP)    4. Elevated prostate specific antigen (PSA)  Reviewed with him flowsheet of PSA from last year compared to prior. Screen today. If higher than last year, would ref to urology.   Called pt with results- discussed consult w/urology in [place]. Referral placed.  - PSA, screen; Future  - PSA, screen    5. Screening for hyperlipidemia  Reviewed prior lipid panel   - Lipid panel reflex to direct LDL Non-fasting; Future  - Comprehensive metabolic panel (BMP + Alb, Alk Phos, ALT, AST, Total. Bili, TP); Future  - Lipid panel reflex to direct LDL Non-fasting  - Comprehensive metabolic panel (BMP + Alb, Alk Phos, ALT, AST, Total. Bili, TP)    6. Benign prostatic hyperplasia (BPH) with post-void dribbling  Discussed BPH sx, treatment. He would like to try tamsulosin. Discussed possible side effects , how to take, not to take daily dose if using cialis (which he has not been using).   Recheck if sx not improving.   - tamsulosin (FLOMAX) 0.4 MG capsule; Take 1 capsule (0.4 mg) by mouth daily  Dispense: 90 capsule; Refill: 0    7. Personal history of tobacco use  Counseling as below on screening. Discussed risks vs benefits. He would like to do screening if covered in his insurance plan. Order placed.   - Prof fee: Shared Decision Making for Lung Cancer Screening  - CT Chest Lung Cancer Scrn Low Dose wo; Future    Patient has been advised of split billing requirements and indicates understanding: Yes      COUNSELING:  Reviewed preventive health counseling, as reflected in patient instructions       Regular exercise       Healthy diet/nutrition       Vision screening       Bladder control       Prostate cancer screening      BMI:   Estimated body mass index is 30.58 kg/m  as calculated from the following:    Height as of 4/12/22: 1.67 m (5' 5.75\").    Weight as of 4/12/22: 85.3 kg (188 lb).   Weight management plan: Discussed healthy diet " and exercise guidelines      He reports that he quit smoking about 7 years ago. His smoking use included cigarettes. He has a 32.00 pack-year smoking history. He has never used smokeless tobacco.      Appropriate preventive services were discussed with this patient, including applicable screening as appropriate for cardiovascular disease, diabetes, osteopenia/osteoporosis, and glaucoma.  As appropriate for age/gender, discussed screening for colorectal cancer, prostate cancer, breast cancer, and cervical cancer. Checklist reviewing preventive services available has been given to the patient.    Reviewed patients plan of care and provided an AVS. The Intermediate Care Plan ( asthma action plan, low back pain action plan, and migraine action plan) for Aquiles meets the Care Plan requirement. This Care Plan has been established and reviewed with the Patient.          Lyubov Almanza PA-C  [facility]    Identified Health Risks:  Lung Cancer Screening Shared Decision Making Visit     Aquiles Mckeon, a 72 year old male, is eligible for lung cancer screening    History   Smoking Status     Former     Packs/day: 0.80     Years: 40.00     Types: Cigarettes     Quit date: 5/1/2015   Smokeless Tobacco     Never       I have discussed with patient the risks and benefits of screening for lung cancer with low-dose CT.     The risks include:    radiation exposure: one low dose chest CT has as much ionizing radiation as about 15 chest x-rays, or 6 months of background radiation living in [place]      false positives: most findings/nodules are NOT cancer, but some might still require additional diagnostic evaluation, including biopsy    over-diagnosis: some slow growing cancers that might never have been clinically significant will be detected and treated unnecessarily     The benefit of early detection of lung cancer is contingent upon adherence to annual screening or more frequent follow up if indicated.      Furthermore, to benefit from screening, Aquiles must be willing and able to undergo diagnostic procedures, if indicated. Although no specific guide is available for determining severity of comorbidities, it is reasonable to withhold screening in patients who have greater mortality risk from other diseases.     We did discuss that the best way to prevent lung cancer is to not smoke.    Some patients may value a numeric estimation of lung cancer risk when evaluating if lung cancer screening is right for them, here is one calculator:    ShouldIScreen

## 2023-02-20 NOTE — PATIENT INSTRUCTIONS
We talked about lung cancer screening CT  You are eligible because you smoked more than a 20 packyr history, you quit less than 15 yrs ago, and you are in the age range that is eligible. But you can call your insurance plan to verify.     We talked about tamsulosin for prostate enlargement symptoms.       Patient Education   Personalized Prevention Plan  You are due for the preventive services outlined below.  Your care team is available to assist you in scheduling these services.  If you have already completed any of these items, please share that information with your care team to update in your medical record.  Health Maintenance Due   Topic Date Due    LUNG CANCER SCREENING  Never done    COVID-19 Vaccine (3 - Booster for Pfizer series) 05/20/2021    Flu Vaccine (1) 09/01/2022    ANNUAL REVIEW OF HM ORDERS  10/22/2022    Annual Wellness Visit  03/17/2023    Cholesterol Lab  03/17/2023        Lung Cancer Screening   Frequently Asked Questions  If you are at high-risk for lung cancer, getting screened with low-dose computed tomography (LDCT) every year can help save your life. This handout offers answers to some of the most common questions about lung cancer screening. If you have other questions, please call [phone] ([phone]).     What is it?  Lung cancer screening uses special X-ray technology to create an image of your lung tissue. The exam is quick and easy and takes less than 10 seconds. We don t give you any medicine or use any needles. You can eat before and after the exam. You don t need to change your clothes as long as the clothing on your chest doesn t contain metal. But, you do need to be able to hold your breath for at least 6 seconds during the exam.    What is the goal of lung cancer screening?  The goal of lung cancer screening is to save lives. Many times, lung cancer is not found until a person starts having physical symptoms. Lung cancer screening can help detect lung cancer in  the earliest stages when it may be easier to treat.    Who should be screened for lung cancer?  We suggest lung cancer screening for anyone who is at high-risk for lung cancer. You are in the high-risk group if you:     are between the ages of 55 and 79, and   have smoked at least 1 pack of cigarettes a day for 20 or more years, and   still smoke or have quit within the past 15 years.    However, if you have a new cough or shortness of breath, you should talk to your doctor before being screened.    Why does it matter if I have symptoms?  Certain symptoms can be a sign that you have a condition in your lungs that should be checked and treated by your doctor. These symptoms include fever, chest pain, a new or changing cough, shortness of breath that you have never felt before, coughing up blood or unexplained weight loss. Having any of these symptoms can greatly affect the results of lung cancer screening.       Should all smokers get an LDCT lung cancer screening exam?  It depends. Lung cancer screening is for a very specific group of men and women who have a history of heavy smoking over a long period of time (see  Who should be screened for lung cancer  above).  I am in the high-risk group, but have been diagnosed with cancer in the past. Is LDCT lung cancer screening right for me?  In some cases, you should not have LDCT lung screening, such as when your doctor is already following your cancer with CT scan studies. Your doctor will help you decide if LDCT lung screening is right for you.  Do I need to have a screening exam every year?  Yes. If you are in the high-risk group described earlier, you should get an LDCT lung cancer screening exam every year until you are 79, or are no longer willing or able to undergo screening and possible procedures to diagnose and treat lung cancer.  How effective is LDCT at preventing death from lung cancer?  Studies have shown that LDCT lung cancer screening can lower the risk of  death from lung cancer by 20 percent in people who are at high-risk.  What are the risks?  There are some risks and limitations of LDCT lung cancer screening. We want to make sure you understand the risks and benefits, so please let us know if you have any questions. Your doctor may want to talk with you more about these risks.   Radiation exposure: As with any exam that uses radiation, there is a very small increased risk of cancer. The amount of radiation in LDCT is small--about the same amount a person would get from a mammogram. Your doctor orders the exam when he or she feels the potential benefits outweigh the risks.   False negatives: No test is perfect, including LDCT. It is possible that you may have a medical condition, including lung cancer, that is not found during your exam. This is called a false negative result.   False positives and more testing: LDCT very often finds something in the lung that could be cancer, but in fact is not. This is called a false positive result. False positive tests often cause anxiety. To make sure these findings are not cancer, you may need to have more tests. These tests will be done only if you give us permission. Sometimes patients need a treatment that can have side effects, such as a biopsy. For more information on false positives, see  What can I expect from the results?    Findings not related to lung cancer: Your LDCT exam also takes pictures of areas of your body next to your lungs. In a very small number of cases, the CT scan will show an abnormal finding in one of these areas, such as your kidneys, adrenal glands, liver or thyroid. This finding may not be serious, but you may need more tests. Your doctor can help you decide what other tests you may need, if any.  What can I expect from the results?  About 1 out of 4 LDCT exams will find something that may need more tests. Most of the time, these findings are lung nodules. Lung nodules are very small collections  of tissue in the lung. These nodules are very common, and the vast majority--more than 97 percent--are not cancer (benign). Most are normal lymph nodes or small areas of scarring from past infections.  But, if a small lung nodule is found to be cancer, the cancer can be cured more than 90 percent of the time. To know if the nodule is cancer, we may need to get more images before your next yearly screening exam. If the nodule has suspicious features (for example, it is large, has an odd shape or grows over time), we will refer you to a specialist for further testing.  Will my doctor also get the results?  Yes. Your doctor will get a copy of your results.  Is it okay to keep smoking now that there s a cancer screening exam?  No. Tobacco is one of the strongest cancer-causing agents. It causes not only lung cancer, but other cancers and cardiovascular (heart) diseases as well. The damage caused by smoking builds over time. This means that the longer you smoke, the higher your risk of disease. While it is never too late to quit, the sooner you quit, the better.  Where can I find help to quit smoking?  The best way to prevent lung cancer is to stop smoking. If you have already quit smoking, congratulations and keep it up! For help on quitting smoking, please call [employer] at 8-003-QUITNOW ([phone]) or the [employer] at 1-660.963.6526 to find local resources near you.  One-on-one health coaching:  If you d prefer to work individually with a health care provider on tobacco cessation, we offer:     Medication Therapy Management:  Our specially trained pharmacists work closely with you and your doctor to help you quit smoking.  Call [phone] or [phone] (toll free).

## 2023-02-20 NOTE — TELEPHONE ENCOUNTER
Forms/Letter Request    Type of form/letter: [employer] Donor Form    Have you been seen for this request: Yes in for yearly physical on 2/20/23    Do we have the form/letter: Yes: given to provider    When is form/letter needed by: Patient has an appt on Thursday and patient can not donate unless they have the form. Needs to be sent over by Wednesday of this week. Patient states that he has an appt on 2/7/23 that was cancelled and this is the soonest he could get in- he has not been able to donate and that is costing him money due to not being able to donate.     How would you like the form/letter returned: Fax [phone]    Patient Notified form requests are processed in 3-5 business days:Yes    Could we send this information to you in [place] or would you prefer to receive a phone call?:   Patient would like to be contacted via [employer] when form has been faxed.     Tremayne Calle MA on 2/20/2023 at 10:18 AM

## 2023-02-20 NOTE — TELEPHONE ENCOUNTER
"Sent mychart and called patient to reschedule. Patient very frustrated stating \"You people do this all the time\" and \"I need this appointment\". Patient was moved to Lyubov Almanza's schedule for same arrival time and appt time.    Elizabeth Crews CMA ([facility])    "

## 2023-03-13 ENCOUNTER — TELEPHONE (OUTPATIENT)
Dept: UROLOGY | Facility: CLINIC | Age: 73
End: 2023-03-13
Payer: COMMERCIAL

## 2023-03-13 NOTE — TELEPHONE ENCOUNTER
Reschedule appointment on 04/28/23 due to provider being out of clinic. Rescheduled to 5/19 at 8:00am with JACQUES Wheat LPN on 3/13/2023 at 1:51 PM

## 2023-05-11 NOTE — PROGRESS NOTES
Name: Aquiles Mckeon    MRN: [id number]   YOB: 1950                 Chief Complaint:   Elevated PSA         Assessment and Plan:   73 year old male with elevated PSA and LUTS. PCP started him on tamsulosin in February and he reports symptomatic improvement. Would like a refill today. For the elevated PSA, we discussed possible etiologies and the utility of PSA as a screening tool for prostate cancer. Recent BUD with PCP revealed possible mild prostate enlargement, no nodules (only the bottom 1/3 of the prostate was palpable on exam). As it has been 3 months, will plan to recheck a PSA today. If still elevated or increasing, will likely plan for prostate MRI. If decreasing, continue to monitor.       -Repeat PSA today. Follow up pending results.   -Tamsulosin 0.4 mg daily refilled.     Phuong Hooper PA-C  May 19, 2023          History of Present Illness:   Aquiles Mckeon is a 73 year old male seen in consultation from Lyubov Almanza PA-C for evaluation of elevated PSA. His PSA trend has been as follows:    Lab Results   Component Value Date    PSA 6.74 02/20/2023    PSA 4.30 03/17/2022    PSA 3.21 03/11/2021    PSA 2.96 01/23/2020    PSA 2.82 12/03/2015       No known family history of prostate cancer.  No history of prostatitis, UTIs, gross hematuria.  Started on tamsulosin 2/20/23 for LUTS - nocturia and post-void dribbling. Reports that it has helped and he requests a refill today.         Past Medical History:     Past Medical History:   Diagnosis Date     ED (erectile dysfunction)      Hx of colonic polyp 01/01/2001            Past Surgical History:     Past Surgical History:   Procedure Laterality Date     COLONOSCOPY  2001    Reported as having a polyp removed     COLONOSCOPY  2004    Clear by report     COLONOSCOPY N/A 1/29/2016    Procedure: COLONOSCOPY;  Surgeon: Juanjo Martínez MD;  Location: [facility] OR     COLONOSCOPY N/A 4/19/2022    Procedure: COLONOSCOPY, FLEXIBLE, WITH  LESION REMOVAL USING SNARE;  Surgeon: Jian Mixon DO;  Location: [facility] OR     COLONOSCOPY WITH CO2 INSUFFLATION N/A 2016    Procedure: COLONOSCOPY WITH CO2 INSUFFLATION;  Surgeon: Juanjo Martínez MD;  Location: [facility] OR     COLONOSCOPY WITH CO2 INSUFFLATION N/A 2022    Procedure: COLONOSCOPY, WITH CO2 INSUFFLATION;  Surgeon: Jian Mixon DO;  Location: [facility] OR            Social History:     Social History     Tobacco Use     Smoking status: Former     Packs/day: 0.80     Years: 40.00     Pack years: 32.00     Types: Cigarettes     Quit date: 2015     Years since quittin.0     Passive exposure: Never     Smokeless tobacco: Never     Tobacco comments:     quit smoking 2015   Vaping Use     Vaping status: Never Used     Passive vaping exposure: Yes   Substance Use Topics     Alcohol use: Yes     Alcohol/week: 6.0 standard drinks of alcohol     Types: 6 Standard drinks or equivalent per week     Comment: Beer mainly- 5-6 per week            Family History:     Family History   Problem Relation Age of Onset     Cervical Cancer Mother      Lung Cancer Mother      Heart Disease Father         CHF     Emphysema Father      Diabetes No family hx of      Hypertension No family hx of      Breast Cancer No family hx of      Prostate Cancer No family hx of      Mental Illness No family hx of      Osteoporosis No family hx of      Obesity No family hx of             Allergies:     Allergies   Allergen Reactions     Ampicillin             Medications:     Current Outpatient Medications   Medication Sig     tamsulosin (FLOMAX) 0.4 MG capsule Take 1 capsule (0.4 mg) by mouth daily     No current facility-administered medications for this visit.             Review of Systems:    ROS: 14 point ROS neg other than the symptoms noted above in the HPI and PMH.          Physical Exam:   There were no vitals taken for this visit.  General: age-appropriate appearing male in NAD.   HEENT: Head  AT/NC, EOMI, CN Grossly intact  Resp: no respiratory distress.  : deferred  Rectal exam: deferred given recently performed by PCP  Neuro: grossly intact  Motor: excellent strength throughout  Skin: clear of rashes or ecchymoses.        Labs:      Lab Results   Component Value Date    PSA 6.74 02/20/2023    PSA 4.30 03/17/2022    PSA 3.21 03/11/2021    PSA 2.96 01/23/2020    PSA 2.82 12/03/2015       Creatinine   Date Value Ref Range Status   02/20/2023 0.94 0.66 - 1.25 mg/dL Final   03/11/2021 1.10 0.66 - 1.25 mg/dL Final       No results found for: COLOR, APPEARANCE, URINEGLC, URINEBILI, URINEKETONE, SG, URINEPH, PROTEIN, UROBILINOGEN, NITRITE, LEUKEST      Imaging:    None       20 minutes spent on the date of the encounter doing chart review, review of test results, interpretation of tests, patient visit and documentation

## 2023-05-19 ENCOUNTER — OFFICE VISIT (OUTPATIENT)
Dept: UROLOGY | Facility: CLINIC | Age: 73
End: 2023-05-19
Payer: COMMERCIAL

## 2023-05-19 DIAGNOSIS — R39.9 LOWER URINARY TRACT SYMPTOMS (LUTS): ICD-10-CM

## 2023-05-19 DIAGNOSIS — R97.20 ELEVATED PROSTATE SPECIFIC ANTIGEN (PSA): Primary | ICD-10-CM

## 2023-05-19 LAB — PSA SERPL-MCNC: 3.78 UG/L (ref 0–4)

## 2023-05-19 PROCEDURE — 36415 COLL VENOUS BLD VENIPUNCTURE: CPT | Performed by: PHYSICIAN ASSISTANT

## 2023-05-19 PROCEDURE — 84153 ASSAY OF PSA TOTAL: CPT | Performed by: PHYSICIAN ASSISTANT

## 2023-05-19 PROCEDURE — 99203 OFFICE O/P NEW LOW 30 MIN: CPT | Performed by: PHYSICIAN ASSISTANT

## 2023-05-19 RX ORDER — TAMSULOSIN HYDROCHLORIDE 0.4 MG/1
0.4 CAPSULE ORAL DAILY
Qty: 90 CAPSULE | Refills: 3 | Status: SHIPPED | OUTPATIENT
Start: 2023-05-19 | End: 2024-05-08

## 2023-05-19 NOTE — NURSING NOTE
Aquiles Mckeon's chief complaint for this visit includes:    Chief Complaint   Patient presents with     New Patient     Elevated PSA. PSA on 2/20/23 6.74       PCP: Lyubov Almanza         Referring Provider:    Lyubov Almanza PA-C  [address] [place] [address]         There were no vitals taken for this visit.    Data Unavailable            Allergies   Allergen Reactions     Ampicillin                  Do you need any medication refills at today's visit? No    Kenzie Garsia LPN on 5/19/2023 at 8:02 AM

## 2023-05-19 NOTE — PATIENT INSTRUCTIONS
UROLOGY CLINIC VISIT PATIENT INSTRUCTIONS    Recheck PSA today.     Continue tamsulosin 0.4 mg daily which was refilled.    Next steps pending results of today's PSA.     If you have any issues, questions or concerns in the meantime, do not hesitate to contact us at [phone] or via [employer].     It was a pleasure meeting with you today.  Thank you for allowing me and my team the privilege of caring for you today.  YOU are the reason we are here, and I truly hope we provided you with the excellent service you deserve.  Please let us know if there is anything else we can do for you so that we can be sure you are leaving completely satisfied with your care experience.

## 2023-05-23 ENCOUNTER — TELEPHONE (OUTPATIENT)
Dept: UROLOGY | Facility: CLINIC | Age: 73
End: 2023-05-23

## 2023-05-24 NOTE — TELEPHONE ENCOUNTER
5/24 Called patient and left voicemail. Provided patient with [phone] to schedule PSA lab in 6 months (aprox Nov 2023).     Sonal dior Procedure   Dermatology, Surgery, Urology  [facility]

## 2023-05-30 NOTE — TELEPHONE ENCOUNTER
5/30 Patient scheduled.     Sonal dior Procedure   [facility]ermat[facility], Surgery, Urology  [facility]

## 2023-07-21 NOTE — TELEPHONE ENCOUNTER
This lab appointment was scheduled on a closed lab and clinic schedule (Thanksgiving Day)     Mychart sent to patient to discuss reschedule.

## 2023-09-19 ENCOUNTER — NURSE TRIAGE (OUTPATIENT)
Dept: FAMILY MEDICINE | Facility: CLINIC | Age: 73
End: 2023-09-19
Payer: COMMERCIAL

## 2023-09-19 NOTE — TELEPHONE ENCOUNTER
"The patient is calling requesting an appointment for:     Nail   Left foot, big toe. Symptoms started 3 weeks ago. The patient cut a \"V\" in front of it. The patient feels he shouldn't be cutting it any shorter.   Soaks in Epson salt.  SOB  Symptoms started to worsen 6 months ago. The patient has difficult time going up the stairs. He doesn't have to stop. 1-2 week ago and needed to rest after putting a solar cover over the pool. The patient had to rest 5 minutes. Denies chest pain. The patient has a hx of 47 years smoking. With over exertion he does have wheezing.   Rests for a few minutes.     Pain in armpits (intermittent)  Symptoms started 3 month ago. Located at the bottom of the axilla bilaterally. Pain is rated at a 4-5/10. Denies chest pain. He explains bilateral dull pain in armpits. Denies lumps, redness, or skin discoloration. Symptoms are intermittent. Symptoms last 4-5 minutes, then fades away. There is no set activity that causes the symptoms to develop. Denies injury. Discomfort does not stop the patient's activity.   Plan:   Advised if symptoms worsen to go into the ER.   Request for the patient to be worked into PCP schedule. Per protocol the patient should be seen in 3 days.     Ember Santiago, MSN, RN, PHN  [place]  September 19, 2023    Reason for Disposition   MODERATE longstanding difficulty breathing (e.g., speaks in phrases, SOB even at rest, pulse 100-120) and SAME as normal    Additional Information   Negative: SEVERE difficulty breathing (e.g., struggling for each breath, speaks in single words, pulse > 120)   Negative: Breathing stopped and hasn't returned   Negative: Choking on something   Negative: Bluish (or gray) lips or face   Negative: Difficult to awaken or acting confused (e.g., disoriented, slurred speech)   Negative: Passed out (i.e., fainted, collapsed and was not responding)   Negative: Wheezing started suddenly after medicine, an allergic " "food, or bee sting   Negative: Stridor (harsh sound while breathing in)   Negative: Slow, shallow and weak breathing   Negative: Sounds like a life-threatening emergency to the triager   Negative: Chest pain   Negative: Wheezing (high pitched whistling sound) and previous asthma attacks or use of asthma medicines   Negative: Difficulty breathing and within 14 days of COVID-19 Exposure   Negative: Difficulty breathing and only present when coughing   Negative: Difficulty breathing and only from stuffy nose   Negative: Difficulty breathing and only from stuffy nose or runny nose from common cold   Negative: MODERATE difficulty breathing (e.g., speaks in phrases, SOB even at rest, pulse 100-120) of new-onset or worse than normal   Negative: Oxygen level (e.g., pulse oximetry) 90% or lower   Negative: Wheezing can be heard across the room   Negative: Drooling or spitting out saliva (because can't swallow)   Negative: Any history of prior \"blood clot\" in leg or lungs   Negative: Illness requiring prolonged bedrest in past month (e.g., immobilization, long hospital stay)   Negative: Hip or leg fracture (broken bone) in past month (or had cast on leg or ankle in past month)   Negative: Major surgery in the past month   Negative: Long-distance travel in past month (e.g., car, bus, train, plane; with trip lasting 6 or more hours)   Negative: Cancer treatment in past six months (or has cancer now)   Negative: Extra heartbeats, irregular heart beating, or heart is beating very fast (i.e., 'palpitations')   Negative: Fever > 103 F (39.4 C)   Negative: Fever > 101 F (38.3 C) and over 60 years of age   Negative: Fever > 100.0 F (37.8 C) and bedridden (e.g., nursing home patient, stroke, chronic illness, recovering from surgery)   Negative: Fever > 100.0 F (37.8 C) and diabetes mellitus or weak immune system (e.g., HIV positive, cancer chemo, splenectomy, organ transplant, chronic steroids)   Negative: Periods where breathing " stops and then resumes normally and bedridden (e.g., nursing home patient, CVA)   Negative: Pregnant or postpartum (from 0 to 6 weeks after delivery)   Negative: Patient sounds very sick or weak to the triager   Negative: MILD difficulty breathing (e.g., minimal/no SOB at rest, SOB with walking, pulse < 100) of new-onset or worse than normal   Negative: Longstanding difficulty breathing (e.g., CHF, COPD, emphysema) and worse than normal   Negative: Longstanding difficulty breathing and not responding to usual therapy   Negative: Continuous (nonstop) coughing   Negative: Patient wants to be seen   Negative: Oxygen level (e.g., pulse oximetry) 91 to 94%    Protocols used: Breathing Difficulty-A-OH

## 2023-09-20 ENCOUNTER — OFFICE VISIT (OUTPATIENT)
Dept: FAMILY MEDICINE | Facility: CLINIC | Age: 73
End: 2023-09-20
Payer: COMMERCIAL

## 2023-09-20 ENCOUNTER — ANCILLARY PROCEDURE (OUTPATIENT)
Dept: GENERAL RADIOLOGY | Facility: CLINIC | Age: 73
End: 2023-09-20
Attending: PHYSICIAN ASSISTANT
Payer: COMMERCIAL

## 2023-09-20 VITALS
BODY MASS INDEX: 32.47 KG/M2 | RESPIRATION RATE: 22 BRPM | HEIGHT: 66 IN | HEART RATE: 82 BPM | DIASTOLIC BLOOD PRESSURE: 82 MMHG | TEMPERATURE: 97.8 F | WEIGHT: 202 LBS | OXYGEN SATURATION: 97 % | SYSTOLIC BLOOD PRESSURE: 128 MMHG

## 2023-09-20 DIAGNOSIS — R06.09 DOE (DYSPNEA ON EXERTION): ICD-10-CM

## 2023-09-20 DIAGNOSIS — L60.0 INGROWN TOENAIL OF LEFT FOOT: ICD-10-CM

## 2023-09-20 DIAGNOSIS — L03.032 PARONYCHIA OF TOE, LEFT: ICD-10-CM

## 2023-09-20 DIAGNOSIS — R06.09 DOE (DYSPNEA ON EXERTION): Primary | ICD-10-CM

## 2023-09-20 LAB
ALBUMIN SERPL BCG-MCNC: 4.3 G/DL (ref 3.5–5.2)
ALP SERPL-CCNC: 65 U/L (ref 40–129)
ALT SERPL W P-5'-P-CCNC: 19 U/L (ref 0–70)
ANION GAP SERPL CALCULATED.3IONS-SCNC: 9 MMOL/L (ref 7–15)
AST SERPL W P-5'-P-CCNC: 25 U/L (ref 0–45)
BILIRUB SERPL-MCNC: 0.4 MG/DL
BUN SERPL-MCNC: 16.7 MG/DL (ref 8–23)
CALCIUM SERPL-MCNC: 9.4 MG/DL (ref 8.8–10.2)
CHLORIDE SERPL-SCNC: 105 MMOL/L (ref 98–107)
CREAT SERPL-MCNC: 0.88 MG/DL (ref 0.67–1.17)
D DIMER PPP FEU-MCNC: 0.48 UG/ML FEU (ref 0–0.5)
DEPRECATED HCO3 PLAS-SCNC: 24 MMOL/L (ref 22–29)
EGFRCR SERPLBLD CKD-EPI 2021: >90 ML/MIN/1.73M2
ERYTHROCYTE [DISTWIDTH] IN BLOOD BY AUTOMATED COUNT: 13 % (ref 10–15)
GLUCOSE SERPL-MCNC: 83 MG/DL (ref 70–99)
HBA1C MFR BLD: 5.9 % (ref 0–5.6)
HCT VFR BLD AUTO: 45.6 % (ref 40–53)
HGB BLD-MCNC: 15.3 G/DL (ref 13.3–17.7)
MCH RBC QN AUTO: 30 PG (ref 26.5–33)
MCHC RBC AUTO-ENTMCNC: 33.6 G/DL (ref 31.5–36.5)
MCV RBC AUTO: 89 FL (ref 78–100)
PLATELET # BLD AUTO: 224 10E3/UL (ref 150–450)
POTASSIUM SERPL-SCNC: 4.8 MMOL/L (ref 3.4–5.3)
PROT SERPL-MCNC: 7 G/DL (ref 6.4–8.3)
RBC # BLD AUTO: 5.1 10E6/UL (ref 4.4–5.9)
SODIUM SERPL-SCNC: 138 MMOL/L (ref 136–145)
TSH SERPL DL<=0.005 MIU/L-ACNC: 0.4 UIU/ML (ref 0.3–4.2)
WBC # BLD AUTO: 8.9 10E3/UL (ref 4–11)

## 2023-09-20 PROCEDURE — 85379 FIBRIN DEGRADATION QUANT: CPT | Performed by: PHYSICIAN ASSISTANT

## 2023-09-20 PROCEDURE — 36415 COLL VENOUS BLD VENIPUNCTURE: CPT | Performed by: PHYSICIAN ASSISTANT

## 2023-09-20 PROCEDURE — 83036 HEMOGLOBIN GLYCOSYLATED A1C: CPT | Performed by: PHYSICIAN ASSISTANT

## 2023-09-20 PROCEDURE — 93000 ELECTROCARDIOGRAM COMPLETE: CPT | Performed by: PHYSICIAN ASSISTANT

## 2023-09-20 PROCEDURE — 99214 OFFICE O/P EST MOD 30 MIN: CPT | Mod: 25 | Performed by: PHYSICIAN ASSISTANT

## 2023-09-20 PROCEDURE — 80053 COMPREHEN METABOLIC PANEL: CPT | Performed by: PHYSICIAN ASSISTANT

## 2023-09-20 PROCEDURE — 85027 COMPLETE CBC AUTOMATED: CPT | Performed by: PHYSICIAN ASSISTANT

## 2023-09-20 PROCEDURE — 84443 ASSAY THYROID STIM HORMONE: CPT | Performed by: PHYSICIAN ASSISTANT

## 2023-09-20 PROCEDURE — 71046 X-RAY EXAM CHEST 2 VIEWS: CPT | Mod: TC | Performed by: RADIOLOGY

## 2023-09-20 RX ORDER — CEPHALEXIN 500 MG/1
500 CAPSULE ORAL 3 TIMES DAILY
Qty: 21 CAPSULE | Refills: 0 | Status: SHIPPED | OUTPATIENT
Start: 2023-09-20 | End: 2023-10-27

## 2023-09-20 ASSESSMENT — PAIN SCALES - GENERAL: PAINLEVEL: NO PAIN (0)

## 2023-09-20 ASSESSMENT — ENCOUNTER SYMPTOMS: SHORTNESS OF BREATH: 1

## 2023-09-20 NOTE — PROGRESS NOTES
"  Assessment & Plan     OLIVO (dyspnea on exertion)  OLIVO for ~6 months.   CXR is normal.  EKG is normal  No anemia.  Neg D-dimer (had driving trip to [place] and drove 12 hr straight w/o stopping).   A1c stable in prediabetes range.  Other labs pending  Concern OLIVO could be anginal equivalent. He is describing \"armpit pain\" that occurs at rest. Discussed s/sx that are emergent and when to be seen in ER. Advised starting aspirin 81mg.  Referral for stress echo.  - EKG 12-lead complete w/read - Clinics  - XR Chest 2 Views; Future  - CBC with platelets; Future  - Comprehensive metabolic panel; Future  - TSH with free T4 reflex; Future  - Hemoglobin A1c; Future  - D dimer, quantitative; Future  - CBC with platelets  - Comprehensive metabolic panel  - TSH with free T4 reflex  - Hemoglobin A1c  - D dimer, quantitative  - Echocardiogram Exercise Stress; Future    Paronychia of toe, left  Soak toe. Start antibiotic. Ref to podiatry for management of ingrown nail.   - cephALEXin (KEFLEX) 500 MG capsule; Take 1 capsule (500 mg) by mouth 3 times daily    Ingrown toenail of left foot  - Orthopedic  Referral; Future     BMI:   Estimated body mass index is 33.1 kg/m  as calculated from the following:    Height as of this encounter: 1.664 m (5' 5.5\").    Weight as of this encounter: 91.6 kg (202 lb).   Weight management plan: Discussed healthy diet and exercise guidelines    Follow Up: see above. Additionally patient was instructed to contact clinic for worsening symptoms, non-improvement in time frame discussed, and for questions regarding treatment plan.         Lyubov Almanza PA-C  [facility] ARELIS Garber is a 73 year old, presenting for the following health issues:  Shortness of Breath      9/20/2023    10:21 AM   Additional Questions   Roomed by VE       Shortness of Breath    History of Present Illness       Reason for visit:  Toe nail breath shorting  Symptom onset:  More than a " "month  Symptoms include:  Toe pain  Symptom intensity:  Mild  Symptom progression:  Staying the same  Had these symptoms before:  Yes  Has tried/received treatment for these symptoms:  No  What makes it worse:  Touching  What makes it better:  No    He eats 0-1 servings of fruits and vegetables daily.He consumes 0 sweetened beverage(s) daily.He exercises with enough effort to increase his heart rate 9 or less minutes per day.  He exercises with enough effort to increase his heart rate 3 or less days per week.   He is taking medications regularly.           Concern - Breathing concern  Description: SOB with exertion x 6months.    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   Put solar cover on pool, takes about 10min to do that. I was \"shot after that\"- had to lay down on couch after.   Sometimes can hear a little bit of wheezing with it.   No cough with it.  No associated chest pain, neck pain, jaw pain, arm pain.   He has had \"armpit\" pain- \"but that is a different thing\"- axillary pain occurring at random times  No associated palpitations, tachycardia.  Sx are not daily - but are dependent on what I am doing.   Has not been donating plasma for the last 2.5months  Denies weight loss/gain, abd pain, bowel changes (constipation/diarrhea), swelling in the legs, fevers/URI sx.   Smoking hx- estimates ~45 years smoking - 3/4 pack per day. Quit smoking - 2015 (8 yr ago).   Lipids have been normal- last 02/2023- LDL 98, HDL 60, . Trig mildly elevated 202  Did drive to [place] and back in June. No pleuritic pain. No LE edema or calf pain or swelling.     Intensity: mild  Progression of Symptoms:  worsening  Accompanying Signs & Symptoms: SOB with exertion, wheezing occasionally  Previous history of similar problem: no  Precipitating factors:        Worsened by: exertion  Alleviating factors:        Improved by: rest  Therapies tried and outcome:  none    Armpit pain- \"its " "weird\". Both armpits.   Last time happened was yesterday while driving to work.   Not positional. Can happen with arms at sides.   Lasts 5 minutes maybe.  When has the armpit pain- no  SOB, CP, palpitaitons. \"I consider this a diff issue\"    Toe nail- Big toe nail left foot  In growing, tender. For a month has been tender. No drainage. Red.       Review of Systems   Respiratory:  Positive for shortness of breath.       Constitutional, HEENT, cardiovascular, pulmonary, gi and gu systems are negative, except as otherwise noted.      Objective    /82 (BP Location: Left arm, Patient Position: Chair, Cuff Size: Adult Regular)   Pulse 82   Temp 97.8  F (36.6  C) (Temporal)   Resp 22   Ht 1.664 m (5' 5.5\")   Wt 91.6 kg (202 lb)   SpO2 97%   BMI 33.10 kg/m    Body mass index is 33.1 kg/m .  Physical Exam   GENERAL: healthy, alert and no distress  EYES: Eyes grossly normal to inspection, PERRL and conjunctivae and sclerae normal  HENT: ear canals and TM's normal, nose and mouth without ulcers or lesions  NECK: no adenopathy, no asymmetry, masses, or scars and thyroid normal to palpation  RESP: lungs clear to auscultation - no rales, rhonchi or wheezes  CV: regular rate and rhythm, normal S1 S2, no S3 or S4, no murmur, click or rub, no peripheral edema and peripheral pulses strong  ABDOMEN: soft, nontender, no hepatosplenomegaly, no masses and bowel sounds normal  MS: no gross musculoskeletal defects noted, no edema  NEURO: Normal strength and tone, mentation intact and speech normal  PSYCH: mentation appears normal, affect normal/bright  LYMPH: normal ant/post cervical, supraclavicular nodes    EKG: NSR, no ectopy, no ST segment changes.   Results for orders placed or performed in visit on 09/20/23   XR Chest 2 Views     Status: None    Narrative    CHEST TWO VIEWS  9/20/2023 11:23 AM    HISTORY:  OLIVO (dyspnea on exertion).    COMPARISON: None.    FINDINGS/    Impression    IMPRESSION: Minimal atelectasis and/or " scarring is seen in the lingula  and left lung base. No signs of pneumonia, pneumothorax, or pleural  fluid. Heart size and pulmonary vasculature are normal appearing. No  acute osseous abnormality.    MORENA KEENAN MD         SYSTEM ID:  [id number]   Results for orders placed or performed in visit on 09/20/23   CBC with platelets     Status: Normal   Result Value Ref Range    WBC Count 8.9 4.0 - 11.0 10e3/uL    RBC Count 5.10 4.40 - 5.90 10e6/uL    Hemoglobin 15.3 13.3 - 17.7 g/dL    Hematocrit 45.6 40.0 - 53.0 %    MCV 89 78 - 100 fL    MCH 30.0 26.5 - 33.0 pg    MCHC 33.6 31.5 - 36.5 g/dL    RDW 13.0 10.0 - 15.0 %    Platelet Count 224 150 - 450 10e3/uL   Hemoglobin A1c     Status: Abnormal   Result Value Ref Range    Hemoglobin A1C 5.9 (H) 0.0 - 5.6 %   D dimer, quantitative     Status: Normal   Result Value Ref Range    D-Dimer Quantitative 0.48 0.00 - 0.50 ug/mL FEU    Narrative    This D-dimer assay is intended for use in conjunction with a clinical pretest probability assessment model to exclude pulmonary embolism (PE) and deep venous thrombosis (DVT) in outpatients suspected of PE or DVT. The cut-off value is 0.50 ug/mL FEU.    For patients 50 years of age or older, the application of age-adjusted cut-off values for D-Dimer may increase the specificity without significant effect on sensitivity. The literature suggested calculation age adjusted cut-off in ug/L = age in years x 10 ug/L. The results in this laboratory are reported as ug/mL rather than ug/L. The calculation for age adjusted cut off in ug/mL= age in years x 0.01 ug/mL. For example, the cut off for a 76 year old male is 77 x 0.01 ug/mL = 0.76 ug/mL (760 ug/L).    JAYME Bertrand et al. Age adjusted D-dimer cut-off levels to rule out pulmonary embolism: The ADJUST-PE Study. NINA 2014;311:1417-5396.; HJ Tremaine et al. Diagnostic accuracy of conventional or age adjusted D-dimer cutoff values in older patients with suspected venous thromboembolism.  Systemic review and meta-analysis. BMJ 2013:346:f2492.

## 2023-09-20 NOTE — PATIENT INSTRUCTIONS
Plan for stress echocardiogram  Start aspirin 81mg daily   Avoid strenuous activity that can cause symptoms  If you have shortness of breath with chest pain, palpations, sweating, feeling like  you could pass out- to the ER (don't drive yourself)    If stress testing is normal, then plan for breathing tests    Other labs still in process.     D-dimer is test to look for  blood clot in the lung. If this is elevated need an emergent CT scan of chest.

## 2023-10-05 ENCOUNTER — OFFICE VISIT (OUTPATIENT)
Dept: PODIATRY | Facility: CLINIC | Age: 73
End: 2023-10-05
Attending: PHYSICIAN ASSISTANT
Payer: COMMERCIAL

## 2023-10-05 DIAGNOSIS — L03.032 PARONYCHIA, TOE, LEFT: Primary | ICD-10-CM

## 2023-10-05 DIAGNOSIS — L60.0 INGROWN TOENAIL OF LEFT FOOT: ICD-10-CM

## 2023-10-05 PROCEDURE — 99214 OFFICE O/P EST MOD 30 MIN: CPT | Performed by: PODIATRIST

## 2023-10-05 RX ORDER — CEPHALEXIN 500 MG/1
500 CAPSULE ORAL 2 TIMES DAILY
Qty: 14 CAPSULE | Refills: 0 | Status: SHIPPED | OUTPATIENT
Start: 2023-10-05 | End: 2023-10-27

## 2023-10-05 NOTE — LETTER
10/5/2023         RE: Aquiles Mckeon  [id number] [facility] [address]        Dear Colleague,    Thank you for referring your patient, Aquiles Mckeon, to the [facility]. Please see a copy of my visit note below.    Past Medical History:   Diagnosis Date     ED (erectile dysfunction)      Hx of colonic polyp 2001     Patient Active Problem List   Diagnosis     Hyperlipidemia with target LDL less than 160     History of colonic polyps     Erectile dysfunction, unspecified erectile dysfunction type     Advanced directives, counseling/discussion     History of tobacco use     Impaired fasting glucose     Past Surgical History:   Procedure Laterality Date     COLONOSCOPY      Reported as having a polyp removed     COLONOSCOPY      Clear by report     COLONOSCOPY N/A 2016    Procedure: COLONOSCOPY;  Surgeon: Juanjo Martínez MD;  Location: [facility]     COLONOSCOPY N/A 2022    Procedure: COLONOSCOPY, FLEXIBLE, WITH LESION REMOVAL USING SNARE;  Surgeon: Jian Mixon DO;  Location: [facility]     COLONOSCOPY WITH CO2 INSUFFLATION N/A 2016    Procedure: COLONOSCOPY WITH CO2 INSUFFLATION;  Surgeon: Juanjo Martínez MD;  [facility]     COLONOSCOPY WITH CO2 INSUFFLATION N/A 2022    Procedure: COLONOSCOPY, WITH CO2 INSUFFLATION;  Surgeon: Jian Mixon DO;  Location: [facility]     Social History     Socioeconomic History     Marital status:      Spouse name: Not on file     Number of children: Not on file     Years of education: Not on file     Highest education level: Not on file   Occupational History     Not on file   Tobacco Use     Smoking status: Former     Packs/day: 0.80     Years: 40.00     Pack years: 32.00     Types: Cigarettes     Quit date: 2015     Years since quittin.4     Passive exposure: Never     Smokeless tobacco: Never     Tobacco comments:     quit smoking 2015   Vaping Use     Vaping  Use: Never used   Substance and Sexual Activity     Alcohol use: Yes     Alcohol/week: 6.0 standard drinks of alcohol     Types: 6 Standard drinks or equivalent per week     Comment: Beer mainly- 5-6 per week     Drug use: No     Sexual activity: Not Currently     Partners: Female     Birth control/protection: Male Surgical     Comment: Vasectomy   Other Topics Concern     Parent/sibling w/ CABG, MI or angioplasty before 65F 55M? No   Social History Narrative     Not on file     Social Determinants of Health     Financial Resource Strain: Not on file   Food Insecurity: Not on file   Transportation Needs: Not on file   Physical Activity: Not on file   Stress: Not on file   Social Connections: Not on file   Interpersonal Safety: Low Risk  (9/20/2023)    Interpersonal Safety      Do you feel physically and emotionally safe where you currently live?: Yes      Within the past 12 months, have you been hit, slapped, kicked or otherwise physically hurt by someone?: No      Within the past 12 months, have you been humiliated or emotionally abused in other ways by your partner or ex-partner?: No   Housing Stability: Not on file     Family History   Problem Relation Age of Onset     Cervical Cancer Mother      Lung Cancer Mother      Heart Disease Father         CHF     Emphysema Father      Diabetes No family hx of      Hypertension No family hx of      Breast Cancer No family hx of      Prostate Cancer No family hx of      Mental Illness No family hx of      Osteoporosis No family hx of      Obesity No family hx of      Lab Results   Component Value Date    A1C 5.9 09/20/2023    A1C 6.0 02/20/2023    A1C 5.9 03/17/2022    A1C 5.8 03/11/2021    A1C 5.6 01/23/2020     Last Comprehensive Metabolic Panel:  Lab Results   Component Value Date     09/20/2023    POTASSIUM 4.8 09/20/2023    CHLORIDE 105 09/20/2023    CO2 24 09/20/2023    ANIONGAP 9 09/20/2023    GLC 83 09/20/2023    BUN 16.7 09/20/2023    CR 0.88 09/20/2023     GFRESTIMATED >90 09/20/2023    DANICA 9.4 09/20/2023       Lab Results   Component Value Date    AST 25 09/20/2023    AST 19 03/11/2021     Lab Results   Component Value Date    ALT 19 09/20/2023    ALT 27 03/11/2021     No results found for: BILICONJ   Lab Results   Component Value Date    BILITOTAL 0.4 09/20/2023     Lab Results   Component Value Date    ALBUMIN 4.3 09/20/2023    ALBUMIN 3.8 02/20/2023     Lab Results   Component Value Date    PROTTOTAL 7.0 09/20/2023      Lab Results   Component Value Date    ALKPHOS 65 09/20/2023     Lab Results   Component Value Date    WBC 8.9 09/20/2023    WBC 7.4 01/23/2020     Lab Results   Component Value Date    RBC 5.10 09/20/2023    RBC 5.11 01/23/2020     Lab Results   Component Value Date    HGB 15.3 09/20/2023    HGB 15.2 01/23/2020     Lab Results   Component Value Date    HCT 45.6 09/20/2023    HCT 46.0 01/23/2020     No components found for: MCT  Lab Results   Component Value Date    MCV 89 09/20/2023    MCV 90 01/23/2020     Lab Results   Component Value Date    MCH 30.0 09/20/2023    MCH 29.7 01/23/2020     Lab Results   Component Value Date    MCHC 33.6 09/20/2023    MCHC 33.0 01/23/2020     Lab Results   Component Value Date    RDW 13.0 09/20/2023    RDW 13.8 01/23/2020     Lab Results   Component Value Date     09/20/2023     01/23/2020           Subjective findings 73-year-old presents from family practice for ingrown toenail left hallux, I reviewed family practice 9/20/2023 note.  He relates he took the Keflex with no problems felt that helped some, relates no injury, relates her skin at the tip of the left lateral hallux nail border, relates it started about 1 month ago, relates his big toenails been curving in as time goes.    Objective findings DP and PT are 2 out of 4 left.  Has incurvated left hallux nail borders. Left distal lateral hallux nail border with mild erythema, positive edema, positive dry serosanguineous drainage, positive pain  on palpation, no odor, no calor.  Has contracted digits with fifth toe overlapping medially and laterally deviated hallux.    Assessment and plan paronychia left lateral hallux nail border.  Diagnosis and treatment options discussed with the patient.  He relates he is going to be doing some work on his feet this week so he is hesitant to do any nail removal procedures.  The left lateral hallux nail border was debrided and local wound care with cleaning with MicroKlenz, applying bacitracin and a Band-Aid done upon consent today.  Bacitracin MicroKlenz and Band-Aids dispensed and use discussed with him for twice daily wound cares.  Prescription for Keflex given and use discussed with him.  Advised him on foot soaks.  If this is not resolved return to clinic and see me in 1 to 2 weeks.        Low level of medical decision making.      Again, thank you for allowing me to participate in the care of your patient.        Sincerely,        Chris Villa DPM

## 2023-10-05 NOTE — NURSING NOTE
Aquiles Mckeon's chief complaint for this visit includes:  Chief Complaint   Patient presents with    Consult     Left big toe ingrown nail, patient feels like has gotten a little better, no pain in discomfort, PCP gave medication for possible infection, started around a month ago, tried epsom salt soak.     PCP: Lyubov Almanza    Referring Provider:  Lyubov Almanza PA-C  [address] [place] [address]    There were no vitals taken for this visit.  Data Unavailable     Do you need any medication refills at today's visit? NO    Allergies   Allergen Reactions    Jan Mulligan LPN

## 2023-10-05 NOTE — PROGRESS NOTES
Past Medical History:   Diagnosis Date    ED (erectile dysfunction)     Hx of colonic polyp 2001     Patient Active Problem List   Diagnosis    Hyperlipidemia with target LDL less than 160    History of colonic polyps    Erectile dysfunction, unspecified erectile dysfunction type    Advanced directives, counseling/discussion    History of tobacco use    Impaired fasting glucose     Past Surgical History:   Procedure Laterality Date    COLONOSCOPY      Reported as having a polyp removed    COLONOSCOPY      Clear by report    COLONOSCOPY N/A 2016    Procedure: COLONOSCOPY;  Surgeon: Juanjo Martínez MD;  [facility]: [facility]    COLONOSCOPY N/A 2022    Procedure: COLONOSCOPY, FLEXIBLE, WITH LESION REMOVAL USING SNARE;  Surgeon: Jian Mixon DO;  [facility]    COLONOSCOPY WITH CO2 INSUFFLATION N/A 2016    Procedure: COLONOSCOPY WITH CO2 INSUFFLATION;  Surgeon: Juanjo Martínez MD;  [facility]    COLONOSCOPY WITH CO2 INSUFFLATION N/A 2022    Procedure: COLONOSCOPY, WITH CO2 INSUFFLATION;  Surgeon: Jian Mixon DO;  Location: [facility]     Social History     Socioeconomic History    Marital status:      Spouse name: Not on file    Number of children: Not on file    Years of education: Not on file    Highest education level: Not on file   Occupational History    Not on file   Tobacco Use    Smoking status: Former     Packs/day: 0.80     Years: 40.00     Pack years: 32.00     Types: Cigarettes     Quit date: 2015     Years since quittin.4     Passive exposure: Never    Smokeless tobacco: Never    Tobacco comments:     quit smoking 2015   Vaping Use    Vaping Use: Never used   Substance and Sexual Activity    Alcohol use: Yes     Alcohol/week: 6.0 standard drinks of alcohol     Types: 6 Standard drinks or equivalent per week     Comment: Beer mainly- 5-6 per week    Drug use: No    Sexual activity: Not Currently     Partners:  Female     Birth control/protection: Male Surgical     Comment: Vasectomy   Other Topics Concern    Parent/sibling w/ CABG, MI or angioplasty before 65F 55M? No   Social History Narrative    Not on file     Social Determinants of Health     Financial Resource Strain: Not on file   Food Insecurity: Not on file   Transportation Needs: Not on file   Physical Activity: Not on file   Stress: Not on file   Social Connections: Not on file   Interpersonal Safety: Low Risk  (9/20/2023)    Interpersonal Safety     Do you feel physically and emotionally safe where you currently live?: Yes     Within the past 12 months, have you been hit, slapped, kicked or otherwise physically hurt by someone?: No     Within the past 12 months, have you been humiliated or emotionally abused in other ways by your partner or ex-partner?: No   Housing Stability: Not on file     Family History   Problem Relation Age of Onset    Cervical Cancer Mother     Lung Cancer Mother     Heart Disease Father         CHF    Emphysema Father     Diabetes No family hx of     Hypertension No family hx of     Breast Cancer No family hx of     Prostate Cancer No family hx of     Mental Illness No family hx of     Osteoporosis No family hx of     Obesity No family hx of      Lab Results   Component Value Date    A1C 5.9 09/20/2023    A1C 6.0 02/20/2023    A1C 5.9 03/17/2022    A1C 5.8 03/11/2021    A1C 5.6 01/23/2020     Last Comprehensive Metabolic Panel:  Lab Results   Component Value Date     09/20/2023    POTASSIUM 4.8 09/20/2023    CHLORIDE 105 09/20/2023    CO2 24 09/20/2023    ANIONGAP 9 09/20/2023    GLC 83 09/20/2023    BUN 16.7 09/20/2023    CR 0.88 09/20/2023    GFRESTIMATED >90 09/20/2023    DANICA 9.4 09/20/2023       Lab Results   Component Value Date    AST 25 09/20/2023    AST 19 03/11/2021     Lab Results   Component Value Date    ALT 19 09/20/2023    ALT 27 03/11/2021     No results found for: BILICONJ   Lab Results   Component Value Date     BILITOTAL 0.4 09/20/2023     Lab Results   Component Value Date    ALBUMIN 4.3 09/20/2023    ALBUMIN 3.8 02/20/2023     Lab Results   Component Value Date    PROTTOTAL 7.0 09/20/2023      Lab Results   Component Value Date    ALKPHOS 65 09/20/2023     Lab Results   Component Value Date    WBC 8.9 09/20/2023    WBC 7.4 01/23/2020     Lab Results   Component Value Date    RBC 5.10 09/20/2023    RBC 5.11 01/23/2020     Lab Results   Component Value Date    HGB 15.3 09/20/2023    HGB 15.2 01/23/2020     Lab Results   Component Value Date    HCT 45.6 09/20/2023    HCT 46.0 01/23/2020     No components found for: MCT  Lab Results   Component Value Date    MCV 89 09/20/2023    MCV 90 01/23/2020     Lab Results   Component Value Date    MCH 30.0 09/20/2023    MCH 29.7 01/23/2020     Lab Results   Component Value Date    MCHC 33.6 09/20/2023    MCHC 33.0 01/23/2020     Lab Results   Component Value Date    RDW 13.0 09/20/2023    RDW 13.8 01/23/2020     Lab Results   Component Value Date     09/20/2023     01/23/2020           Subjective findings 73-year-old presents from family practice for ingrown toenail left hallux, I reviewed family practice 9/20/2023 note.  He relates he took the Keflex with no problems felt that helped some, relates no injury, relates her skin at the tip of the left lateral hallux nail border, relates it started about 1 month ago, relates his big toenails been curving in as time goes.    Objective findings DP and PT are 2 out of 4 left.  Has incurvated left hallux nail borders. Left distal lateral hallux nail border with mild erythema, positive edema, positive dry serosanguineous drainage, positive pain on palpation, no odor, no calor.  Has contracted digits with fifth toe overlapping medially and laterally deviated hallux.    Assessment and plan paronychia left lateral hallux nail border.  Diagnosis and treatment options discussed with the patient.  He relates he is going to be doing  some work on his feet this week so he is hesitant to do any nail removal procedures.  The left lateral hallux nail border was debrided and local wound care with cleaning with MicroKlenz, applying bacitracin and a Band-Aid done upon consent today.  Bacitracin MicroKlenz and Band-Aids dispensed and use discussed with him for twice daily wound cares.  Prescription for Keflex given and use discussed with him.  Advised him on foot soaks.  If this is not resolved return to clinic and see me in 1 to 2 weeks.        Low level of medical decision making.

## 2023-10-27 ENCOUNTER — ANCILLARY PROCEDURE (OUTPATIENT)
Dept: CARDIOLOGY | Facility: CLINIC | Age: 73
End: 2023-10-27
Attending: PHYSICIAN ASSISTANT
Payer: COMMERCIAL

## 2023-10-27 ENCOUNTER — OFFICE VISIT (OUTPATIENT)
Dept: CARDIOLOGY | Facility: CLINIC | Age: 73
End: 2023-10-27
Payer: COMMERCIAL

## 2023-10-27 ENCOUNTER — TELEPHONE (OUTPATIENT)
Dept: FAMILY MEDICINE | Facility: CLINIC | Age: 73
End: 2023-10-27

## 2023-10-27 ENCOUNTER — TELEPHONE (OUTPATIENT)
Dept: CARDIOLOGY | Facility: CLINIC | Age: 73
End: 2023-10-27

## 2023-10-27 VITALS
SYSTOLIC BLOOD PRESSURE: 148 MMHG | WEIGHT: 202 LBS | OXYGEN SATURATION: 96 % | HEART RATE: 101 BPM | DIASTOLIC BLOOD PRESSURE: 80 MMHG | BODY MASS INDEX: 33.1 KG/M2

## 2023-10-27 DIAGNOSIS — R94.39 ABNORMAL STRESS ECHOCARDIOGRAM: Primary | ICD-10-CM

## 2023-10-27 DIAGNOSIS — R94.39 ABNORMAL CARDIOVASCULAR STRESS TEST: ICD-10-CM

## 2023-10-27 DIAGNOSIS — R06.09 DOE (DYSPNEA ON EXERTION): ICD-10-CM

## 2023-10-27 DIAGNOSIS — I20.89 STABLE ANGINA PECTORIS (H): Primary | ICD-10-CM

## 2023-10-27 PROCEDURE — 93350 STRESS TTE ONLY: CPT | Performed by: INTERNAL MEDICINE

## 2023-10-27 PROCEDURE — 93016 CV STRESS TEST SUPVJ ONLY: CPT | Performed by: INTERNAL MEDICINE

## 2023-10-27 PROCEDURE — 93321 DOPPLER ECHO F-UP/LMTD STD: CPT | Performed by: INTERNAL MEDICINE

## 2023-10-27 PROCEDURE — 93325 DOPPLER ECHO COLOR FLOW MAPG: CPT | Performed by: INTERNAL MEDICINE

## 2023-10-27 PROCEDURE — 93017 CV STRESS TEST TRACING ONLY: CPT | Performed by: INTERNAL MEDICINE

## 2023-10-27 PROCEDURE — 93018 CV STRESS TEST I&R ONLY: CPT | Performed by: INTERNAL MEDICINE

## 2023-10-27 PROCEDURE — 99205 OFFICE O/P NEW HI 60 MIN: CPT | Mod: 25 | Performed by: INTERNAL MEDICINE

## 2023-10-27 RX ORDER — NITROGLYCERIN 0.4 MG/1
TABLET SUBLINGUAL
Qty: 25 TABLET | Refills: 3 | Status: SHIPPED | OUTPATIENT
Start: 2023-10-27

## 2023-10-27 RX ORDER — ROSUVASTATIN CALCIUM 20 MG/1
20 TABLET, COATED ORAL DAILY
Qty: 90 TABLET | Refills: 3 | Status: SHIPPED | OUTPATIENT
Start: 2023-10-27 | End: 2023-11-02

## 2023-10-27 RX ORDER — SODIUM CHLORIDE 9 MG/ML
INJECTION, SOLUTION INTRAVENOUS CONTINUOUS
Status: CANCELLED | OUTPATIENT
Start: 2023-10-27

## 2023-10-27 RX ORDER — ASPIRIN 81 MG/1
81 TABLET ORAL DAILY
Status: ON HOLD
Start: 2023-10-27 | End: 2023-11-21

## 2023-10-27 RX ORDER — ASPIRIN 325 MG
325 TABLET ORAL ONCE
Status: CANCELLED | OUTPATIENT
Start: 2023-10-27 | End: 2023-10-27

## 2023-10-27 RX ORDER — LIDOCAINE 40 MG/G
CREAM TOPICAL
Status: CANCELLED | OUTPATIENT
Start: 2023-10-27

## 2023-10-27 RX ORDER — ASPIRIN 81 MG/1
243 TABLET, CHEWABLE ORAL ONCE
Status: CANCELLED | OUTPATIENT
Start: 2023-10-27

## 2023-10-27 RX ADMIN — Medication 5 ML: at 09:35

## 2023-10-27 NOTE — PROGRESS NOTES
[facility] Cardiology Consultation:    Assessment and Plan:     Dyspnea on exertion, abnormal stress test with ECG changes and normal echo: Needs further evaluation, discussed options of CTA versus invasive angiogram.  Shared decision making regarding invasive angiogram, this should be done urgently hopefully next week given stress test findings.  Risk benefits were discussed with the patient and he is agreeable.  For now, advised to start aspirin and statin, and take sublingual nitroglycerin as needed.  Prediabetes, mildly elevated triglycerides  Ex-smoker, quit 2015    A total of 60 minutes were spent on the day of the visit for chart review, care coordination, face-to-face consultation with the patient, and documentation.    Aiden Krause MD    Cardiac Imaging and Prevention  [facility]  [email or web address] Pager: [phone]    HPI: Seen as urgent add-on due to abnormal stress test that was performed earlier today.  This was reviewed by me.  He exercised for 6-1/2 minutes, and at submaximal stress level (heart rate 81% predicted), the test was stopped prematurely due to concerning EKG changes.  He developed diffuse inferolateral 1 mm ST depressions along with ST elevation in aVR.  He also had frequent PVCs with few 4 beat runs of NSVT.  Resting echo showed normal cardiac function with no structural heart disease, and there was no ischemia noted by echo with stress.  He reports dyspnea on exertion for the past many months, though it has worsened recently.  He also reports armpit pain, that can occur at rest or with exertion.  He denies specifically chest pain or discomfort. Denies orthopnea, pnd, palpitations, syncope/presyncope or edema.  CAD risk factors include male sex, prior heavy smoking, prediabetes  Basic labs are normal.  Lipid profile shows an LDL cholesterol of 98 and mildly elevated triglycerides.  Hemoglobin A1c is 5.8    EXAM:  BP (!) 148/80 (BP  Location: Right arm, Patient Position: Sitting, Cuff Size: Adult Regular)   Pulse 101   Wt 91.6 kg (202 lb)   SpO2 96%   BMI 33.10 kg/m    GEN/CONSTITUIONAL: Appears comfortable, in no apparent distress   EYES: No icterus  ENT/MOUTH: Normal  JVP:  Not visible  RESPIRATORY: Clear to auscultation bilaterally   CARDIOVASCULAR: Regular S1 and S2, no murmurs, rubs, or gallops.   ABDOMEN: Soft, non-tender, positive bowel sounds   NEUROLOGIC: Grossly non-focal   PSYCHIATRIC: Normal affect  EXT: No cyanosis, clubbing, edema. Normal pedal pulses.  Skin: No petechiae, purpura or rash    PAST MEDICAL HISTORY:  Past Medical History:   Diagnosis Date    ED (erectile dysfunction)     Hx of colonic polyp 01/01/2001       CURRENT MEDICATIONS:  Current Outpatient Medications   Medication    cephALEXin (KEFLEX) 500 MG capsule    cephALEXin (KEFLEX) 500 MG capsule    tamsulosin (FLOMAX) 0.4 MG capsule     No current facility-administered medications for this visit.       PAST SURGICAL HISTORY:  Past Surgical History:   Procedure Laterality Date    COLONOSCOPY  2001    Reported as having a polyp removed    COLONOSCOPY  2004    Clear by report    COLONOSCOPY N/A 1/29/2016    Procedure: COLONOSCOPY;  Surgeon: Juanjo Martínez MD;  Location: [facility]    COLONOSCOPY N/A 4/19/2022    Procedure: COLONOSCOPY, FLEXIBLE, WITH LESION REMOVAL USING SNARE;  Surgeon: Jian Mixon DO;  Location: [facility]    COLONOSCOPY WITH CO2 INSUFFLATION N/A 1/29/2016    Procedure: COLONOSCOPY WITH CO2 INSUFFLATION;  Surgeon: Juanjo Martínez MD;  Location: [facility]    COLONOSCOPY WITH CO2 INSUFFLATION N/A 4/19/2022    Procedure: COLONOSCOPY, WITH CO2 INSUFFLATION;  Surgeon: Jian Mixon DO;  Location: [facility]       ALLERGIES     Allergies   Allergen Reactions    Ampicillin        FAMILY HISTORY:  Family History   Problem Relation Age of Onset    Cervical Cancer Mother     Lung Cancer Mother     Heart Disease Father         CHF     Emphysema Father     Diabetes No family hx of     Hypertension No family hx of     Breast Cancer No family hx of     Prostate Cancer No family hx of     Mental Illness No family hx of     Osteoporosis No family hx of     Obesity No family hx of        SOCIAL HISTORY:  Social History     Socioeconomic History    Marital status:      Spouse name: Not on file    Number of children: Not on file    Years of education: Not on file    Highest education level: Not on file   Occupational History    Not on file   Tobacco Use    Smoking status: Former     Packs/day: 0.80     Years: 40.00     Additional pack years: 0.00     Total pack years: 32.00     Types: Cigarettes     Quit date: 2015     Years since quittin.4     Passive exposure: Never    Smokeless tobacco: Never    Tobacco comments:     quit smoking 2015   Vaping Use    Vaping Use: Never used   Substance and Sexual Activity    Alcohol use: Yes     Alcohol/week: 6.0 standard drinks of alcohol     Types: 6 Standard drinks or equivalent per week     Comment: Beer mainly- 5-6 per week    Drug use: No    Sexual activity: Not Currently     Partners: Female     Birth control/protection: Male Surgical     Comment: Vasectomy   Other Topics Concern    Parent/sibling w/ CABG, MI or angioplasty before 65F 55M? No   Social History Narrative    Not on file     Social Determinants of Health     Financial Resource Strain: Not on file   Food Insecurity: Not on file   Transportation Needs: Not on file   Physical Activity: Not on file   Stress: Not on file   Social Connections: Not on file   Interpersonal Safety: Low Risk  (2023)    Interpersonal Safety     Do you feel physically and emotionally safe where you currently live?: Yes     Within the past 12 months, have you been hit, slapped, kicked or otherwise physically hurt by someone?: No     Within the past 12 months, have you been humiliated or emotionally abused in other ways by your partner or ex-partner?:  No   Housing Stability: Not on file       ROS:   Constitutional: No fever, chills, or sweats. No weight gain/loss   ENT: No visual disturbance, ear ache, epistaxis, sore throat  Allergies/Immunologic: Negative.   Respiratory: No cough, hemoptysia  Cardiovascular: As per HPI  GI: No nausea, vomiting, hematemesis, melena, or hematochezia  : No urinary frequency, dysuria, or hematuria  Integument: Negative  Psychiatric: Negative  Neuro: Negative  Endocrinology: Negative   Musculoskeletal: Negative    ADDITIONAL COMMENTS:     I reviewed the patient's medications:     I reviewed the patient's pertinent clinical laboratory studies:     I reviewed the patient's pertinent imaging studies:   I reviewed the patient's ECG:

## 2023-10-27 NOTE — TELEPHONE ENCOUNTER
[facility] Call Center    Phone Message    May a detailed message be left on voicemail: no     Reason for Call: Appointment Intake    Referring Provider Name: Lyubov MORLEY  Diagnosis and/or Symptoms: abnormal stress echo-significant ECG changes during peak exercise and recovery period. 3 episodes of non-sustained VT for 3-4 beats during stage 4 exercise.     No availability at [facility] in time frame.  Please review and call pt to schedule- Emergent referral.     Action Taken: Other: cardio    Travel Screening: Not Applicable

## 2023-10-27 NOTE — PATIENT INSTRUCTIONS
Take your medicines every day, as directed     Changes made today:  Take baby ( 81 mg) aspirin daily  Take nitroglycerin as needed for chest discomfort  Take crestor 20 mg every day     Schedule angiogram at [facility]. Nurse will contact with the date of the procedure.        Cardiology Care Coordinators:      Olga BORGES RN     Cardiology Rooming staff:  Deng PIZARRO      Phone  [phone]      Fax [phone]    To Contact us     During Business Hours:  [phone]     If you are needing refills please contact your pharmacy.     For urgent after hour care please call the [facility] at [phone] or the [facility] at [phone] and ask to speak to the cardiologist on call.            HOW TO CHECK YOUR BLOOD PRESSURE AT HOME:     Avoid eating, smoking, and exercising for at least 30 minutes before taking a reading.     Be sure you have taken your BP medication at least 2-3 hours before you check it.      Sit quietly for 10 minutes before a reading.      Sit in a chair with your feet flat on the floor. Rest your  arm on a table so that the arm cuff is at the same level as your heart.     Remain still during the reading.  Record your blood pressure and pulse in a log and bring to your next appointment.       [employer] staff allows you to communicate directly with your heart team through secure messaging.  [employer] can be accessed any time on your phone, computer, or tablet.  If you need assistance, we'd be happy to help!             Keep your Heart Appointments:

## 2023-10-27 NOTE — TELEPHONE ENCOUNTER
Called pt with stress test results. ECG changes during peak exercise and recovery.   He has been taking the aspirin 81mg sporadically. He will take daily.   Placed an emergent cardiology consult.   Advised no strenuous activity.   Advised for any CP or anginal equivalents to be seen in the ER (not to drive self).   He will reach out if he does not heard from cardiology scheduling in the next 2 business days.  Lyubov Almanza PA-C

## 2023-11-02 ENCOUNTER — HOSPITAL ENCOUNTER (OUTPATIENT)
Dept: CT IMAGING | Facility: CLINIC | Age: 73
Discharge: HOME | End: 2023-11-02
Attending: INTERNAL MEDICINE
Payer: COMMERCIAL

## 2023-11-02 ENCOUNTER — HOSPITAL ENCOUNTER (OUTPATIENT)
Dept: CT IMAGING | Facility: CLINIC | Age: 73
Discharge: HOME | End: 2023-11-02
Attending: PHYSICIAN ASSISTANT
Payer: COMMERCIAL

## 2023-11-02 VITALS — DIASTOLIC BLOOD PRESSURE: 76 MMHG | HEART RATE: 67 BPM | SYSTOLIC BLOOD PRESSURE: 140 MMHG

## 2023-11-02 DIAGNOSIS — Z87.891 PERSONAL HISTORY OF TOBACCO USE: ICD-10-CM

## 2023-11-02 DIAGNOSIS — R94.39 ABNORMAL CARDIOVASCULAR STRESS TEST: ICD-10-CM

## 2023-11-02 DIAGNOSIS — I25.10 CORONARY ARTERY DISEASE INVOLVING NATIVE CORONARY ARTERY OF NATIVE HEART WITHOUT ANGINA PECTORIS: Primary | ICD-10-CM

## 2023-11-02 DIAGNOSIS — I20.89 STABLE ANGINA PECTORIS (H): ICD-10-CM

## 2023-11-02 LAB
CREAT BLD-MCNC: 1 MG/DL (ref 0.7–1.3)
EGFRCR SERPLBLD CKD-EPI 2021: >60 ML/MIN/1.73M2

## 2023-11-02 PROCEDURE — 71271 CT THORAX LUNG CANCER SCR C-: CPT | Mod: XU

## 2023-11-02 PROCEDURE — 250N000009 HC RX 250: Performed by: INTERNAL MEDICINE

## 2023-11-02 PROCEDURE — 82565 ASSAY OF CREATININE: CPT

## 2023-11-02 PROCEDURE — 75574 CT ANGIO HRT W/3D IMAGE: CPT

## 2023-11-02 PROCEDURE — 71271 CT THORAX LUNG CANCER SCR C-: CPT | Mod: 26 | Performed by: RADIOLOGY

## 2023-11-02 PROCEDURE — 250N000013 HC RX MED GY IP 250 OP 250 PS 637: Performed by: INTERNAL MEDICINE

## 2023-11-02 PROCEDURE — 250N000011 HC RX IP 250 OP 636: Mod: JZ | Performed by: INTERNAL MEDICINE

## 2023-11-02 PROCEDURE — 75574 CT ANGIO HRT W/3D IMAGE: CPT | Mod: 26 | Performed by: INTERNAL MEDICINE

## 2023-11-02 RX ORDER — IVABRADINE 5 MG/1
5-15 TABLET, FILM COATED ORAL
Status: COMPLETED | OUTPATIENT
Start: 2023-11-02 | End: 2023-11-02

## 2023-11-02 RX ORDER — METHYLPREDNISOLONE SODIUM SUCCINATE 125 MG/2ML
125 INJECTION, POWDER, LYOPHILIZED, FOR SOLUTION INTRAMUSCULAR; INTRAVENOUS
Status: DISCONTINUED | OUTPATIENT
Start: 2023-11-02 | End: 2023-11-03 | Stop reason: HOSPADM

## 2023-11-02 RX ORDER — DIPHENHYDRAMINE HYDROCHLORIDE 50 MG/ML
25-50 INJECTION INTRAMUSCULAR; INTRAVENOUS
Status: DISCONTINUED | OUTPATIENT
Start: 2023-11-02 | End: 2023-11-03 | Stop reason: HOSPADM

## 2023-11-02 RX ORDER — METOPROLOL TARTRATE 25 MG/1
25-100 TABLET, FILM COATED ORAL
Status: COMPLETED | OUTPATIENT
Start: 2023-11-02 | End: 2023-11-02

## 2023-11-02 RX ORDER — ROSUVASTATIN CALCIUM 40 MG/1
40 TABLET, COATED ORAL DAILY
Qty: 90 TABLET | Refills: 3 | Status: SHIPPED | OUTPATIENT
Start: 2023-11-02

## 2023-11-02 RX ORDER — DIPHENHYDRAMINE HCL 25 MG
25 CAPSULE ORAL
Status: DISCONTINUED | OUTPATIENT
Start: 2023-11-02 | End: 2023-11-03 | Stop reason: HOSPADM

## 2023-11-02 RX ORDER — DILTIAZEM HYDROCHLORIDE 120 MG/1
120 TABLET, FILM COATED ORAL
Status: DISCONTINUED | OUTPATIENT
Start: 2023-11-02 | End: 2023-11-03 | Stop reason: HOSPADM

## 2023-11-02 RX ORDER — ONDANSETRON 2 MG/ML
4 INJECTION INTRAMUSCULAR; INTRAVENOUS
Status: DISCONTINUED | OUTPATIENT
Start: 2023-11-02 | End: 2023-11-03 | Stop reason: HOSPADM

## 2023-11-02 RX ORDER — ACYCLOVIR 200 MG/1
0-1 CAPSULE ORAL
Status: DISCONTINUED | OUTPATIENT
Start: 2023-11-02 | End: 2023-11-03 | Stop reason: HOSPADM

## 2023-11-02 RX ORDER — NITROGLYCERIN 0.4 MG/1
.4-.8 TABLET SUBLINGUAL
Status: DISCONTINUED | OUTPATIENT
Start: 2023-11-02 | End: 2023-11-03 | Stop reason: HOSPADM

## 2023-11-02 RX ORDER — DILTIAZEM HYDROCHLORIDE 5 MG/ML
10-15 INJECTION INTRAVENOUS
Status: DISCONTINUED | OUTPATIENT
Start: 2023-11-02 | End: 2023-11-03 | Stop reason: HOSPADM

## 2023-11-02 RX ORDER — IOPAMIDOL 755 MG/ML
120 INJECTION, SOLUTION INTRAVASCULAR ONCE
Status: COMPLETED | OUTPATIENT
Start: 2023-11-02 | End: 2023-11-02

## 2023-11-02 RX ORDER — ISOSORBIDE MONONITRATE 30 MG/1
30 TABLET, EXTENDED RELEASE ORAL DAILY
Qty: 90 TABLET | Refills: 3 | Status: ON HOLD | OUTPATIENT
Start: 2023-11-02 | End: 2023-11-21

## 2023-11-02 RX ORDER — METOPROLOL TARTRATE 1 MG/ML
5-15 INJECTION, SOLUTION INTRAVENOUS
Status: DISCONTINUED | OUTPATIENT
Start: 2023-11-02 | End: 2023-11-03 | Stop reason: HOSPADM

## 2023-11-02 RX ADMIN — IOPAMIDOL 120 ML: 755 INJECTION, SOLUTION INTRAVENOUS at 13:35

## 2023-11-02 RX ADMIN — METOPROLOL TARTRATE 20 MG: 5 INJECTION INTRAVENOUS at 13:37

## 2023-11-02 RX ADMIN — NITROGLYCERIN 0.8 MG: 0.4 TABLET SUBLINGUAL at 13:20

## 2023-11-02 RX ADMIN — IVABRADINE 15 MG: 5 TABLET, FILM COATED ORAL at 12:16

## 2023-11-02 RX ADMIN — METOPROLOL TARTRATE 100 MG: 100 TABLET, FILM COATED ORAL at 12:16

## 2023-11-02 NOTE — PROGRESS NOTES
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   Keep cath appt next week.   Inc crestor to 40 mg every day  Start imdur 30 mg every day     Plan d/w patient and wife.       Aiden Krause MD    Cardiac Imaging and Prevention  [facility]  Pager: [phone]

## 2023-11-02 NOTE — PROGRESS NOTES
Pt arrived for Coronary CT angiogram. Test, meds and side effects reviewed with pt. Resting HR 90 bpm. Given 100 mg PO Metoprolol + 15 mg PO Ivabradine per verbal order. Administered 0.8 mg SL nitro and 20 mg IV metoprolol on CTA table per order. CTA completed. Patient tolerated procedure well and denies symptoms of allergic reaction. Patient did report left upper quadrant anterior flank pain post scan which he states he has experienced in the past and is not related to the CT scan nor contrast dye. Post monitoring completed and VSS. D/C instructions reviewed with pt whom verbalized understanding of need to increase PO fluids today. D/C to [facility] waiting room accompanied by staff.

## 2023-11-03 ENCOUNTER — TELEPHONE (OUTPATIENT)
Dept: CARDIOLOGY | Facility: CLINIC | Age: 73
End: 2023-11-03
Payer: COMMERCIAL

## 2023-11-03 NOTE — TELEPHONE ENCOUNTER
Instructions reviewed with patient via telephone call. Pt expressed understanding and agreeable with the plan. Instructions sent via [employer] as well.     Pre-procedure instructions - Coronary Angiogram  Patient Education    Your arrival time is 6:30 AM .  Location is [address] Waiting Room  Please plan on being at the hospital all day.  At any time, emergencies and/or urgent cases may come up which could delay the start of your procedure.    Pre-procedure instructions - Coronary Angiogram  Shower in the evening before or the morning of the procedure  No solid food for 8 hours prior and nothing to drink 2 hours prior to arrival time  You can take your morning medications (except for diabetic and blood thinners) with sips of water.  Take 325 mg of Asprin 24 hours prior to the procedure and the morning of procedure.   You will need to arrange a ride to drop you off and pick you up, as you will be unable to drive home.  Prior to discharge you may be required to lay flat for approximately 2-4 hours in the recovery unit to ensure proper clotting of the artery.              Diabetic Medication Instructions  Hold oral diabetic medication in morning of your procedure and for 48 hours after IV contrast is given  Typical instructions for insulin diabetic medication holding are below. However, please reach out to your Primary Care Provider or Endocrinologist for specific instructions  DO NOT take any oral diabetic medication, short-acting diabetes medications/insulin, humalog or regular insulin the morning of your test  Take   dose of long-acting insulin (Lantus, Levemir) the day of your test  Remember to bring your glucometer and insulin with you to take after your test if needed  DO NOT take injectable GLP-1 agonists semaglutide (Ozempic, Wegovy), dulaglutide (Trulicity), exenatide ER (Bydureon), tirzepatide (Mounjaro), or oral  semaglutide (Rybelsus) for 7 days prior your procedure  Hold once daily injectable GLP-1 agonists exenatide (Byetta), liraglutide (Saxenda, Victoza) the day before and day of your procedure                  Anticoagulation Medication Instructions   NA    You will need to follow up with one of our cardiology APPs 1-2 weeks after your procedure. If you need help scheduling or rescheduling your appointment, please call [phone]

## 2023-11-09 ENCOUNTER — APPOINTMENT (OUTPATIENT)
Dept: LAB | Facility: CLINIC | Age: 73
End: 2023-11-09
Attending: INTERNAL MEDICINE
Payer: COMMERCIAL

## 2023-11-09 ENCOUNTER — APPOINTMENT (OUTPATIENT)
Dept: ULTRASOUND IMAGING | Facility: CLINIC | Age: 73
End: 2023-11-09
Attending: PHYSICIAN ASSISTANT
Payer: COMMERCIAL

## 2023-11-09 ENCOUNTER — HOSPITAL ENCOUNTER (OUTPATIENT)
Facility: CLINIC | Age: 73
Discharge: HOME | End: 2023-11-09
Attending: INTERNAL MEDICINE | Admitting: INTERNAL MEDICINE
Payer: COMMERCIAL

## 2023-11-09 ENCOUNTER — APPOINTMENT (OUTPATIENT)
Dept: CARDIOLOGY | Facility: CLINIC | Age: 73
End: 2023-11-09
Attending: PHYSICIAN ASSISTANT
Payer: COMMERCIAL

## 2023-11-09 ENCOUNTER — APPOINTMENT (OUTPATIENT)
Dept: MEDSURG UNIT | Facility: CLINIC | Age: 73
End: 2023-11-09
Attending: INTERNAL MEDICINE
Payer: COMMERCIAL

## 2023-11-09 VITALS
DIASTOLIC BLOOD PRESSURE: 80 MMHG | BODY MASS INDEX: 32.28 KG/M2 | WEIGHT: 200.84 LBS | RESPIRATION RATE: 22 BRPM | SYSTOLIC BLOOD PRESSURE: 130 MMHG | HEIGHT: 66 IN | OXYGEN SATURATION: 97 % | HEART RATE: 72 BPM | TEMPERATURE: 96.4 F

## 2023-11-09 DIAGNOSIS — R94.39 ABNORMAL CARDIOVASCULAR STRESS TEST: ICD-10-CM

## 2023-11-09 DIAGNOSIS — R07.9 CHEST PAIN: ICD-10-CM

## 2023-11-09 DIAGNOSIS — I25.10 CORONARY ARTERY DISEASE INVOLVING NATIVE CORONARY ARTERY OF NATIVE HEART WITHOUT ANGINA PECTORIS: Primary | ICD-10-CM

## 2023-11-09 PROBLEM — Z98.890 STATUS POST CORONARY ANGIOGRAM: Status: ACTIVE | Noted: 2023-11-09

## 2023-11-09 LAB
ANION GAP SERPL CALCULATED.3IONS-SCNC: 11 MMOL/L (ref 7–15)
BUN SERPL-MCNC: 15.8 MG/DL (ref 8–23)
CALCIUM SERPL-MCNC: 9.3 MG/DL (ref 8.8–10.2)
CHLORIDE SERPL-SCNC: 106 MMOL/L (ref 98–107)
CREAT SERPL-MCNC: 0.92 MG/DL (ref 0.67–1.17)
DEPRECATED HCO3 PLAS-SCNC: 23 MMOL/L (ref 22–29)
EGFRCR SERPLBLD CKD-EPI 2021: 88 ML/MIN/1.73M2
ERYTHROCYTE [DISTWIDTH] IN BLOOD BY AUTOMATED COUNT: 12.9 % (ref 10–15)
GLUCOSE SERPL-MCNC: 120 MG/DL (ref 70–99)
HCT VFR BLD AUTO: 45.1 % (ref 40–53)
HGB BLD-MCNC: 15.2 G/DL (ref 13.3–17.7)
INR PPP: 1.12 (ref 0.85–1.15)
MCH RBC QN AUTO: 30.8 PG (ref 26.5–33)
MCHC RBC AUTO-ENTMCNC: 33.7 G/DL (ref 31.5–36.5)
MCV RBC AUTO: 92 FL (ref 78–100)
PLATELET # BLD AUTO: 234 10E3/UL (ref 150–450)
POTASSIUM SERPL-SCNC: 4.5 MMOL/L (ref 3.4–5.3)
RBC # BLD AUTO: 4.93 10E6/UL (ref 4.4–5.9)
SODIUM SERPL-SCNC: 140 MMOL/L (ref 135–145)
WBC # BLD AUTO: 8.1 10E3/UL (ref 4–11)

## 2023-11-09 PROCEDURE — 999N000134 HC STATISTIC PP CARE STAGE 3

## 2023-11-09 PROCEDURE — 93970 EXTREMITY STUDY: CPT | Mod: 26 | Performed by: RADIOLOGY

## 2023-11-09 PROCEDURE — 93454 CORONARY ARTERY ANGIO S&I: CPT | Performed by: INTERNAL MEDICINE

## 2023-11-09 PROCEDURE — 99214 OFFICE O/P EST MOD 30 MIN: CPT | Performed by: PHYSICIAN ASSISTANT

## 2023-11-09 PROCEDURE — 93454 CORONARY ARTERY ANGIO S&I: CPT | Mod: 26 | Performed by: INTERNAL MEDICINE

## 2023-11-09 PROCEDURE — 250N000009 HC RX 250: Performed by: INTERNAL MEDICINE

## 2023-11-09 PROCEDURE — 250N000011 HC RX IP 250 OP 636: Mod: JZ | Performed by: INTERNAL MEDICINE

## 2023-11-09 PROCEDURE — 80048 BASIC METABOLIC PNL TOTAL CA: CPT | Performed by: INTERNAL MEDICINE

## 2023-11-09 PROCEDURE — 999N000142 HC STATISTIC PROCEDURE PREP ONLY

## 2023-11-09 PROCEDURE — 36415 COLL VENOUS BLD VENIPUNCTURE: CPT | Performed by: INTERNAL MEDICINE

## 2023-11-09 PROCEDURE — C1894 INTRO/SHEATH, NON-LASER: HCPCS | Performed by: INTERNAL MEDICINE

## 2023-11-09 PROCEDURE — 93005 ELECTROCARDIOGRAM TRACING: CPT

## 2023-11-09 PROCEDURE — 999N000054 HC STATISTIC EKG NON-CHARGEABLE

## 2023-11-09 PROCEDURE — 272N000001 HC OR GENERAL SUPPLY STERILE: Performed by: INTERNAL MEDICINE

## 2023-11-09 PROCEDURE — 93970 EXTREMITY STUDY: CPT

## 2023-11-09 PROCEDURE — 85610 PROTHROMBIN TIME: CPT | Performed by: INTERNAL MEDICINE

## 2023-11-09 PROCEDURE — 93880 EXTRACRANIAL BILAT STUDY: CPT

## 2023-11-09 PROCEDURE — 93880 EXTRACRANIAL BILAT STUDY: CPT | Mod: 26 | Performed by: RADIOLOGY

## 2023-11-09 PROCEDURE — C1887 CATHETER, GUIDING: HCPCS | Performed by: INTERNAL MEDICINE

## 2023-11-09 PROCEDURE — 258N000003 HC RX IP 258 OP 636: Performed by: INTERNAL MEDICINE

## 2023-11-09 PROCEDURE — 85027 COMPLETE CBC AUTOMATED: CPT | Performed by: INTERNAL MEDICINE

## 2023-11-09 PROCEDURE — 99152 MOD SED SAME PHYS/QHP 5/>YRS: CPT | Performed by: INTERNAL MEDICINE

## 2023-11-09 PROCEDURE — 99152 MOD SED SAME PHYS/QHP 5/>YRS: CPT | Mod: GC | Performed by: INTERNAL MEDICINE

## 2023-11-09 RX ORDER — NICARDIPINE HYDROCHLORIDE 2.5 MG/ML
INJECTION INTRAVENOUS
Status: DISCONTINUED | OUTPATIENT
Start: 2023-11-09 | End: 2023-11-09 | Stop reason: HOSPADM

## 2023-11-09 RX ORDER — FENTANYL CITRATE 50 UG/ML
INJECTION, SOLUTION INTRAMUSCULAR; INTRAVENOUS
Status: DISCONTINUED | OUTPATIENT
Start: 2023-11-09 | End: 2023-11-09 | Stop reason: HOSPADM

## 2023-11-09 RX ORDER — LIDOCAINE 40 MG/G
CREAM TOPICAL
Status: DISCONTINUED | OUTPATIENT
Start: 2023-11-09 | End: 2023-11-09 | Stop reason: HOSPADM

## 2023-11-09 RX ORDER — IOPAMIDOL 755 MG/ML
INJECTION, SOLUTION INTRAVASCULAR
Status: DISCONTINUED | OUTPATIENT
Start: 2023-11-09 | End: 2023-11-09 | Stop reason: HOSPADM

## 2023-11-09 RX ORDER — ASPIRIN 81 MG/1
243 TABLET, CHEWABLE ORAL ONCE
Status: COMPLETED | OUTPATIENT
Start: 2023-11-09 | End: 2023-11-09

## 2023-11-09 RX ORDER — NALOXONE HYDROCHLORIDE 0.4 MG/ML
0.2 INJECTION, SOLUTION INTRAMUSCULAR; INTRAVENOUS; SUBCUTANEOUS
Status: DISCONTINUED | OUTPATIENT
Start: 2023-11-09 | End: 2023-11-09 | Stop reason: HOSPADM

## 2023-11-09 RX ORDER — ATROPINE SULFATE 0.1 MG/ML
0.5 INJECTION INTRAVENOUS
Status: DISCONTINUED | OUTPATIENT
Start: 2023-11-09 | End: 2023-11-09 | Stop reason: HOSPADM

## 2023-11-09 RX ORDER — HEPARIN SODIUM 1000 [USP'U]/ML
INJECTION, SOLUTION INTRAVENOUS; SUBCUTANEOUS
Status: DISCONTINUED | OUTPATIENT
Start: 2023-11-09 | End: 2023-11-09 | Stop reason: HOSPADM

## 2023-11-09 RX ORDER — NALOXONE HYDROCHLORIDE 0.4 MG/ML
0.4 INJECTION, SOLUTION INTRAMUSCULAR; INTRAVENOUS; SUBCUTANEOUS
Status: DISCONTINUED | OUTPATIENT
Start: 2023-11-09 | End: 2023-11-09 | Stop reason: HOSPADM

## 2023-11-09 RX ORDER — OXYCODONE HYDROCHLORIDE 5 MG/1
5 TABLET ORAL EVERY 4 HOURS PRN
Status: DISCONTINUED | OUTPATIENT
Start: 2023-11-09 | End: 2023-11-09 | Stop reason: HOSPADM

## 2023-11-09 RX ORDER — NITROGLYCERIN 5 MG/ML
VIAL (ML) INTRAVENOUS
Status: DISCONTINUED | OUTPATIENT
Start: 2023-11-09 | End: 2023-11-09 | Stop reason: HOSPADM

## 2023-11-09 RX ORDER — SODIUM CHLORIDE 9 MG/ML
INJECTION, SOLUTION INTRAVENOUS CONTINUOUS
Status: DISCONTINUED | OUTPATIENT
Start: 2023-11-09 | End: 2023-11-09 | Stop reason: HOSPADM

## 2023-11-09 RX ORDER — ASPIRIN 325 MG
325 TABLET ORAL ONCE
Status: COMPLETED | OUTPATIENT
Start: 2023-11-09 | End: 2023-11-09

## 2023-11-09 RX ORDER — FLUMAZENIL 0.1 MG/ML
0.2 INJECTION, SOLUTION INTRAVENOUS
Status: DISCONTINUED | OUTPATIENT
Start: 2023-11-09 | End: 2023-11-09 | Stop reason: HOSPADM

## 2023-11-09 RX ORDER — FENTANYL CITRATE 50 UG/ML
25 INJECTION, SOLUTION INTRAMUSCULAR; INTRAVENOUS
Status: DISCONTINUED | OUTPATIENT
Start: 2023-11-09 | End: 2023-11-09 | Stop reason: HOSPADM

## 2023-11-09 RX ORDER — OXYCODONE HYDROCHLORIDE 10 MG/1
10 TABLET ORAL EVERY 4 HOURS PRN
Status: DISCONTINUED | OUTPATIENT
Start: 2023-11-09 | End: 2023-11-09 | Stop reason: HOSPADM

## 2023-11-09 RX ADMIN — SODIUM CHLORIDE: 9 INJECTION, SOLUTION INTRAVENOUS at 08:03

## 2023-11-09 ASSESSMENT — ACTIVITIES OF DAILY LIVING (ADL)
ADLS_ACUITY_SCORE: 35

## 2023-11-09 NOTE — DISCHARGE INSTRUCTIONS
Going Home after a Coronary Angiogram      [facility] [place]:  [phone] (7 days a week)    After you go home:  Have an adult stay with you for 24 hours.  Drink plenty of fluids.  You may eat your normal diet, unless your doctor tells you otherwise.  For 24 hours:  - Relax and take it easy.  - Do NOT smoke.  - Do NOT make any important or legal decisions.  - Do NOT drive or operate machines at home or at work.  - Do NOT drink alcohol.    Remove the Band-Aid after 24 hours. If there is minor oozing, apply another Band-aid and remove it after 12 hours.  For 2 days, do NOT have sex or do any heavy exercise.  Do NOT take a bath, or use a hot tub or pool for at least 3 days. You may shower.    Care of wrist or arm site  It is normal to have soreness at the puncture site and mild tingling in your hand for up to 3 days.  For 2 days, do not use your hand or arm to support your weight (such as rising from a chair) or bend your wrist (such as lifting a garage door).  For 2 days, do not lift more than 5 pounds or exercise your arm (tennis, golf or bowling).      If you start bleeding from the site in your arm:  Sit down and press firmly on the site with your fingers for 10 minutes. Call your doctor as soon as you can.  If the bleeding stops, sit still and keep your wrist straight for 2 hours.  Medicines  There have been no changes to your current medications.  Call your doctor if:  You have a large or growing hard lump around the site.    The site is red, swollen, hot or tender.  Blood or fluid is draining from the site.  You have chills or a fever greater than 101 F (38 C).  Your leg or arm turns bluish, feels numb or cool.  You have hives, a rash or unusual itching.  Call 911 right away if you have:   Bleeding that does not stop.   Heavy bleeding.

## 2023-11-09 NOTE — PROGRESS NOTES
"/80   Pulse 72   Temp (!) 96.4  F (35.8  C) (Axillary)   Resp 22   Ht 1.676 m (5' 6\")   Wt 91.1 kg (200 lb 13.4 oz)   SpO2 97%   BMI 32.42 kg/m    Condition is stable s/p coronary angiogram. Vital Signs are stable/WNL. Right radial site clean, dry and intact, cms intact.  Discharge instructions reviewed with patient and questions answered. Patient verbalizes understanding. IV removed. Pain under control. Patient is ambulating and voiding spontaneously. Patient is tolerating regular diet and denies any N/V. Patient to be discharged to home via wife. Patient has all belongings.    "

## 2023-11-09 NOTE — CONSULTS
Cardiothoracic Surgery Consult Note    Consult Reason: Significant 3 v CAD    HPI: Mr. Mckeon is a 74 y/o male with PMH of prediabetes, previous smoking history, HLD, erectile dysfunction being worked up by cardiology for new dyspnea, stress Echo showed ST elevations, went for Cor angio 11/9 which showed significant 3v disease.    A/P:   - Recommend CABG, timing TBD.   - Will set up video visit with surgeon if patient unable to see today   - Will obtain vein mapping, bilateral carotid duplex US  - Stress Echo done 10/27 showed LVEF 55-60%, no RWMA, no significant valvular abnormalities  - Other cares per primary team  - Thank you for the opportunity to participate in the care of this patient.    Patient and plan discussed with attending, Dr. Mulvihill Alyssa Brong PA-C   Cardiothoracic Surgery   Pager #[phone]  November 9, 2023 at 9:45 AM        PMH:  Past Medical History:   Diagnosis Date    ED (erectile dysfunction)     Hx of colonic polyp 01/01/2001         PSH:  Past Surgical History:   Procedure Laterality Date    COLONOSCOPY  2001    Reported as having a polyp removed    COLONOSCOPY  2004    Clear by report    COLONOSCOPY N/A 1/29/2016    Procedure: COLONOSCOPY;  Surgeon: Juanjo Martínez MD;  Location: [facility]    COLONOSCOPY N/A 4/19/2022    Procedure: COLONOSCOPY, FLEXIBLE, WITH LESION REMOVAL USING SNARE;  Surgeon: Jian Mixon DO;  Location: [facility]    COLONOSCOPY WITH CO2 INSUFFLATION N/A 1/29/2016    Procedure: COLONOSCOPY WITH CO2 INSUFFLATION;  Surgeon: Juanjo Martínez MD;  Location: [facility]    COLONOSCOPY WITH CO2 INSUFFLATION N/A 4/19/2022    Procedure: COLONOSCOPY, WITH CO2 INSUFFLATION;  Surgeon: Jian Mixon DO;  Location: [facility]       Past Surgical History:   Procedure Laterality Date    COLONOSCOPY  2001    Reported as having a polyp removed    COLONOSCOPY  2004    Clear by report    COLONOSCOPY N/A 1/29/2016    Procedure: COLONOSCOPY;  Surgeon: Mauricio  Juanjo Fleming MD;  Location: [facility]    COLONOSCOPY N/A 4/19/2022    Procedure: COLONOSCOPY, FLEXIBLE, WITH LESION REMOVAL USING SNARE;  Surgeon: Jian Mixon DO;  [facility]    COLONOSCOPY WITH CO2 INSUFFLATION N/A 1/29/2016    Procedure: COLONOSCOPY WITH CO2 INSUFFLATION;  Surgeon: Juanjo Martínez MD;  [facility]: [facility]    COLONOSCOPY WITH CO2 INSUFFLATION N/A 4/19/2022    Procedure: COLONOSCOPY, WITH CO2 INSUFFLATION;  Surgeon: Jian Mixon DO;  Location: [facility]         FH:  Family History   Problem Relation Age of Onset    Cervical Cancer Mother     Lung Cancer Mother     Heart Disease Father         CHF    Emphysema Father     Coronary Artery Disease Father         MI in 50s    Diabetes No family hx of     Hypertension No family hx of     Breast Cancer No family hx of     Prostate Cancer No family hx of     Mental Illness No family hx of     Osteoporosis No family hx of     Obesity No family hx of        SH:  Social History     Socioeconomic History    Marital status: Single   Tobacco Use    Smoking status: Never    Smokeless tobacco: Never   Vaping Use    Vaping Use: Never used   Substance and Sexual Activity    Alcohol use: Yes     Alcohol/week: 0.0 standard drinks     Comment: Alcoholic Drinks/day: rarely    Drug use: No    Sexual activity: Not Currently     Partners: Male   Social History Narrative    Lives in [place] by self. On unemployment as of 11/2021       Home Meds:  Medications Prior to Admission   Medication Sig Dispense Refill Last Dose    aspirin 81 MG EC tablet Take 1 tablet (81 mg) by mouth daily   11/9/2023 at 0700    isosorbide mononitrate (IMDUR) 30 MG 24 hr tablet Take 1 tablet (30 mg) by mouth daily 90 tablet 3 11/9/2023 at 0700    nitroGLYcerin (NITROSTAT) 0.4 MG sublingual tablet For chest pain place 1 tablet under the tongue every 5 minutes for 3 doses. If symptoms persist 5 minutes after 1st dose call 911. 25 tablet 3 Unknown    rosuvastatin  (CRESTOR) 40 MG tablet Take 1 tablet (40 mg) by mouth daily 90 tablet 3 11/9/2023 at 0700    tamsulosin (FLOMAX) 0.4 MG capsule Take 1 capsule (0.4 mg) by mouth daily 90 capsule 3 11/9/2023 at 0700       Allergies:  Allergies   Allergen Reactions    Ampicillin        ROS: Negative unless noted in HPI    Physical Exam:  Temp:  [98.1  F (36.7  C)] 98.1  F (36.7  C)  Pulse:  [87] 87  Resp:  [16-18] 18  BP: (135)/(86) 135/86  SpO2:  [96 %] 96 %  Gen: NAD, resting comfortably in bed, conversational  Lungs: CTA in all fields  CV: RRR  Musculoskeletal: grossly intact  Neuro: AOx3  Mental: normal mood and affect, regular rate of speech    Labs:  ABG No lab results found in last 7 days.  CBC  Recent Labs   Lab 11/09/23  0759   WBC 8.1   HGB 15.2        BMP  Recent Labs   Lab 11/09/23  0759 11/02/23  1232     --    POTASSIUM 4.5  --    CHLORIDE 106  --    CO2 23  --    BUN 15.8  --    CR 0.92 1.0   *  --      LFT  No lab results found in last 7 days.  PancreasNo lab results found in last 7 days.    Imaging:  Angio Coronary Findings    Diagnostic  Dominance: Right  Left Main   The vessel is large.      Left Anterior Descending   The vessel is moderate in size.   Ost LAD to Mid LAD lesion is 70% stenosed. The lesion is severely calcified.      Left Circumflex   The vessel is moderate in size.   Mid Cx lesion is 90% stenosed. The lesion is severely calcified.      First Obtuse Marginal Branch   The vessel is small.      Second Obtuse Marginal Branch   The vessel is large.      Right Coronary Artery   Prox RCA lesion is 40% stenosed. The lesion is mildly calcified.      Right Posterior Atrioventricular Artery   Collaterals   RPAV filled by collaterals from Dist Cx.        EchoProcedure  Stress Echo Bike with two dimensional, color and spectral Doppler performed.  Contrast Definity.  ______________________________________________________________________________  Interpretation Summary     Abnormal submaximal  exercise stress echocardiogram.     Patient developed significant ECG changes during peak exercise and recovery  period. 3 episodes of non-sustained VT for 3-4 beats during stage 4 exercise.  Diffuse ST segment (~1 mm) depression in inferolateral leads (downsloping in  recovery) with ST segment elevation in AVR (~1 mm) during exercise extending  in to recovery noted.     Patient was only able to reach 81% of the age predicted maximal heart rate due  to dyspnea and no ischemia was identified by imaging at this heart rate.     Normal biventricular size, thickness, and global systolic function at  baseline, LVEF=55-60%.  Normal BP response.  With exercise, LVEF increased and LV cavity size decreased appropriately.  No regional wall motion abnormalities are present at rest or with exercise.  No angina was elicited.  No significant valvular abnormalities are noted on screening Doppler exam.  The aortic root and visualized ascending aorta are normal.     Recommend cardiology consultation.     ______________________________________________________________________________  Stress  Definity (NDC #65506-099-28) given intravenously.  Patient was given 5ml mixture of 1.5ml Definity and 8.5ml saline.  5 ml wasted.  IV start location LAC .  Peak MVO2 23.0 ml/kg/min .  Percent predicted MVO2 71 %.  RPP 52666.  Maximum workload 100 portillo.  Exercise was stopped due to dyspnea.  Target Heart Rate was not achieved due to dyspnea.  The patient did not exhibit any symptoms during exercise.  Normal blood pressure response with stress.     Stress Results                                       Maximum Predicted HR:   148 bpm             Target HR: 126 bpm        % Maximum Predicted HR: 81 %                             Stage  DurationHeart Rate  BP                                 (mm:ss)   (bpm)                         Baseline            86    134/92                           Peak    6:37     120    176/82                          Stress  Duration:   6:37 mm:ss                       Maximum Stress HR: 120 bpm  ______________________________________________________________________________  MMode/2D Measurements & Calculations  asc Aorta Diam: 3.5 cm  Asc Ao diam index BSA (cm/m2): 1.8  Asc Ao diam index Ht(cm/m): 2.1     Doppler Measurements & Calculations  MV E max ligia: 46.8 cm/sec  MV A max ligia: 66.1 cm/sec  MV E/A: 0.71  MV dec time: 0.26 sec  Ao V2 max: 119.0 cm/sec  Ao max P.7 mmHg

## 2023-11-09 NOTE — PROGRESS NOTES
D/I/A: Pt roomed on 3C in [address].  Arrived via litter and accompanied by Fabricio MENEZES On/Off: Off monitor.  VSSA.  Rhythm upon arrival NSR on monitor.  Denies pain or sob.  Reviewed activity restrictions and when to notify RN, ie-changes to breathing or increased chest pressure or chest pain.  CCL access: R radial wrist with 12cc's of air, CDI, CMS+.  P: Continue to monitor status.  Discharge to home once meeting criteria.

## 2023-11-09 NOTE — PROGRESS NOTES
Pt prepped for CORS w/possible PCI. Pt alert and oriented. VSS. Sats >92% on RA. Pt denies any shortness of breath at rest, chest pain/palpitations, nausea,dizziness, or chills. Aspirin 325 mg taken this morning. Radial pulses present. Bilateral groin sites prepped, pulses present. Awaiting labs. Consent  completed. EKG completed: Sinus rhythm. Pt's wife Farheen at bedside and will transport pt home post procedure.

## 2023-11-10 ENCOUNTER — VIRTUAL VISIT (OUTPATIENT)
Dept: CARDIOLOGY | Facility: CLINIC | Age: 73
End: 2023-11-10
Attending: STUDENT IN AN ORGANIZED HEALTH CARE EDUCATION/TRAINING PROGRAM
Payer: COMMERCIAL

## 2023-11-10 DIAGNOSIS — I25.10 CORONARY ARTERY DISEASE INVOLVING NATIVE CORONARY ARTERY OF NATIVE HEART WITHOUT ANGINA PECTORIS: Primary | ICD-10-CM

## 2023-11-10 LAB
ATRIAL RATE - MUSE: 80 BPM
DIASTOLIC BLOOD PRESSURE - MUSE: NORMAL MMHG
INTERPRETATION ECG - MUSE: NORMAL
P AXIS - MUSE: 30 DEGREES
PR INTERVAL - MUSE: 142 MS
QRS DURATION - MUSE: 88 MS
QT - MUSE: 370 MS
QTC - MUSE: 426 MS
R AXIS - MUSE: 19 DEGREES
SYSTOLIC BLOOD PRESSURE - MUSE: NORMAL MMHG
T AXIS - MUSE: 36 DEGREES
VENTRICULAR RATE- MUSE: 80 BPM

## 2023-11-10 PROCEDURE — 99214 OFFICE O/P EST MOD 30 MIN: CPT | Mod: VID | Performed by: STUDENT IN AN ORGANIZED HEALTH CARE EDUCATION/TRAINING PROGRAM

## 2023-11-10 NOTE — LETTER
11/10/2023      RE: Aquiles Mckeon  [address] [facility] [address]       Dear Colleague,    Thank you for the opportunity to participate in the care of your patient, Aquiles Mckeon, at the [facility]. Please see a copy of my visit note below.    Cardiac Surgery Consultation      Aquiles Mckeon MRN# [id number]   YOB: 1950 Age: 73 year old   Date of Admission: Outpatient consultation     Reason for consult: Surgical revascularization           Assessment and Plan:   Mr. Mckeon is a 73-year-old man with severe multivessel coronary artery disease and preserved LVEF. I recommend coronary artery bypass grafting. I described the technical details, as well as the expected postoperative course and recovery to the patient. I also discussed the risks and benefits of the procedure. The risks include but are not limited to bleeding, infection, stroke, heart or graft failure with myocardial infarction, dysrhythmia, respiratory failure, kidney or liver injury, bowel or limb ischemia, and death. The calculated STS risk of mortality is <1%, and the calculated STS risk of major morbidity or mortality is 3%. The patient understands these risks and wishes to undergo the operation.     Surgery will be scheduled in the near future.      Procedure Type: Isolated CABG  Perioperative Outcome Estimate %  Operative Mortality 0.472%  Morbidity & Mortality 3.21%  Stroke 0.474%  Renal Failure 0.394%  Reoperation 1.44%  Prolonged Ventilation 1.58%  Deep Sternal Wound Infection 0.105%  Long Hospital Stay (>14 days) 1.25%  Short Hospital Stay (<6 days) 68.7%    Clinical Summary  Planned Surgery: Isolated CABG, Elective, First cardiovascular surgery  Demographics: 73 year old, White, male, 91kg, 167cm, BMI: 32.6 kg/m   Lab Values: Creatinine: 0.92 mg/dL, Hematocrit: 45%, WBC Count: 8.1 10 /?L, Platelet Count: 995081 cells/?L  Substance  Abuse: Former smoker  Risk Factors / Comorbidities: Hypertension, Family Hx of CAD  Cardiac Status: NYHA Class I, Ejection Fraction = 55%  Coronary Artery Disease: 2 vessel disease, Proximal LAD Stenosis ? 70%, Stable Angina           History of Present Illness:   This patient is a 73 year old male who presents with glucose resistance, prior smoker, HLD evaluated for new [place], some chest discomfort, had stress test positive for ischemia. Referred for angiography which revealed mvCAD. Stable disease, returned home, I met with him and his partner via video visit.                Past Medical History:   I have reviewed and updated this patient's past medical history  Past Medical History:   Diagnosis Date    ED (erectile dysfunction)     Hx of colonic polyp 2001             Past Surgical History:   I have reviewed this patient's past surgical history  Past Surgical History:   Procedure Laterality Date    COLONOSCOPY      Reported as having a polyp removed    COLONOSCOPY      Clear by report    COLONOSCOPY N/A 2016    Procedure: COLONOSCOPY;  Surgeon: Juanjo Martínez MD;  Location: [facility]    COLONOSCOPY N/A 2022    Procedure: COLONOSCOPY, FLEXIBLE, WITH LESION REMOVAL USING SNARE;  Surgeon: Jian Mixon DO;  Location: [facility]    COLONOSCOPY WITH CO2 INSUFFLATION N/A 2016    Procedure: COLONOSCOPY WITH CO2 INSUFFLATION;  Surgeon: Juanjo Martínez MD;  Location: [facility]    COLONOSCOPY WITH CO2 INSUFFLATION N/A 2022    Procedure: COLONOSCOPY, WITH CO2 INSUFFLATION;  Surgeon: Jian Mixon DO;  Location: [facility]               Social History:   I have reviewed this patient's social history  Social History     Tobacco Use    Smoking status: Former     Packs/day: 0.80     Years: 40.00     Additional pack years: 0.00     Total pack years: 32.00     Types: Cigarettes     Quit date: 2015     Years since quittin.5     Passive exposure: Never    Smokeless  tobacco: Never    Tobacco comments:     quit smoking 5/01/2015   Substance Use Topics    Alcohol use: Yes     Alcohol/week: 6.0 standard drinks of alcohol     Types: 6 Standard drinks or equivalent per week     Comment: Beer mainly- 5-6 per week             Family History:   I have reviewed this patient's family history  Family History   Problem Relation Age of Onset    Cervical Cancer Mother     Lung Cancer Mother     Heart Disease Father         CHF    Emphysema Father     Coronary Artery Disease Father         MI in 50s    Diabetes No family hx of     Hypertension No family hx of     Breast Cancer No family hx of     Prostate Cancer No family hx of     Mental Illness No family hx of     Osteoporosis No family hx of     Obesity No family hx of              Allergies:     Allergies   Allergen Reactions    Ampicillin              Medications:     Current Outpatient Medications Ordered in Epic   Medication    aspirin 81 MG EC tablet    isosorbide mononitrate (IMDUR) 30 MG 24 hr tablet    nitroGLYcerin (NITROSTAT) 0.4 MG sublingual tablet    rosuvastatin (CRESTOR) 40 MG tablet    tamsulosin (FLOMAX) 0.4 MG capsule     No current Epic-ordered facility-administered medications on file.             Review of Systems:     The 10 point Review of Systems is negative other than noted in the HPI            Physical Exam:     Vitals stable from catheterization lab  Video visit  Alert, comfortable, in NAD         Data:     Lab Results   Component Value Date    WBC 8.1 11/09/2023    HGB 15.2 11/09/2023    HCT 45.1 11/09/2023     11/09/2023     11/09/2023    POTASSIUM 4.5 11/09/2023    CHLORIDE 106 11/09/2023    CO2 23 11/09/2023    BUN 15.8 11/09/2023    CR 0.92 11/09/2023     (H) 11/09/2023    DD 0.48 09/20/2023    AST 25 09/20/2023    ALT 19 09/20/2023    ALKPHOS 65 09/20/2023    BILITOTAL 0.4 09/20/2023    INR 1.12 11/09/2023     CARDIAC CATHETERIZATION:      Mid Cx lesion is 90% stenosed.    Ost LAD to  Mid LAD lesion is 70% stenosed.    Prox RCA lesion is 40% stenosed.    Left Main   The vessel is large.      Left Anterior Descending   The vessel is moderate in size.   Ost LAD to Mid LAD lesion is 70% stenosed. The lesion is severely calcified.      Left Circumflex   The vessel is moderate in size.   Mid Cx lesion is 90% stenosed. The lesion is severely calcified.      First Obtuse Marginal Branch   The vessel is small.      Second Obtuse Marginal Branch   The vessel is large.      Right Coronary Artery   Prox RCA lesion is 40% stenosed. The lesion is mildly calcified.      Right Posterior Atrioventricular Artery   Collaterals   RPAV filled by collaterals from Dist Cx.          TRANSTHORACIC ECHOCARDIOGRAPHY:  Abnormal submaximal exercise stress echocardiogram.     Patient developed significant ECG changes during peak exercise and recovery  period. 3 episodes of non-sustained VT for 3-4 beats during stage 4 exercise.  Diffuse ST segment (~1 mm) depression in inferolateral leads (downsloping in  recovery) with ST segment elevation in AVR (~1 mm) during exercise extending  in to recovery noted.     Patient was only able to reach 81% of the age predicted maximal heart rate due  to dyspnea and no ischemia was identified by imaging at this heart rate.     Normal biventricular size, thickness, and global systolic function at  baseline, LVEF=55-60%.  Normal BP response.  With exercise, LVEF increased and LV cavity size decreased appropriately.  No regional wall motion abnormalities are present at rest or with exercise.  No angina was elicited.  No significant valvular abnormalities are noted on screening Doppler exam.  The aortic root and visualized ascending aorta are normal.    EKG:      Sinus rhythm  No previous ECGs available       CAROTID US:    IMPRESSION:     1. RIGHT ICA: Less than 50% diameter narrowing by grayscale imaging  and sonographic velocity criteria.     2. LEFT ICA:  Less than 50% diameter narrowing by  grayscale imaging  and sonographic velocity criteria.    CTA (coronary angiography)    ADDITIONAL FINDINGS:      The proximal ascending aorta is normal in size.   Normal pulmonary venous anatomy. All four pulmonary veins drain into  the left atrium.    There is no left ventricular mass or thrombus.   Normal pericardial thickness. There is no pericardial effusion.  Please review the separate Radiology report for incidental noncardiac  findings.        Please do not hesitate to contact me if you have any questions/concerns.     Sincerely,     Michael Mulvihill, MD

## 2023-11-10 NOTE — TELEPHONE ENCOUNTER
Patient contacted for post angio follow-up. Patient reported feeling fine, incision site ( right wrist) looks good,  no concerns at the moment. Patient was referred for surgery and scheduled for a consult this afternoon.

## 2023-11-13 ENCOUNTER — PREP FOR PROCEDURE (OUTPATIENT)
Dept: CARDIOLOGY | Facility: CLINIC | Age: 73
End: 2023-11-13
Payer: COMMERCIAL

## 2023-11-13 ENCOUNTER — TELEPHONE (OUTPATIENT)
Dept: CARDIOLOGY | Facility: CLINIC | Age: 73
End: 2023-11-13
Payer: COMMERCIAL

## 2023-11-13 DIAGNOSIS — I25.10 CORONARY ARTERY DISEASE INVOLVING NATIVE CORONARY ARTERY OF NATIVE HEART WITHOUT ANGINA PECTORIS: Primary | ICD-10-CM

## 2023-11-13 DIAGNOSIS — I25.10 CAD (CORONARY ARTERY DISEASE): Primary | ICD-10-CM

## 2023-11-13 NOTE — PROGRESS NOTES
Cardiac Surgery Consultation      Aquiles Mckeon MRN# [id number]   YOB: 1950 Age: 73 year old   Date of Admission: Outpatient consultation     Reason for consult: Surgical revascularization           Assessment and Plan:   Mr. Mckeon is a 73-year-old man with severe multivessel coronary artery disease and preserved LVEF. I recommend coronary artery bypass grafting. I described the technical details, as well as the expected postoperative course and recovery to the patient. I also discussed the risks and benefits of the procedure. The risks include but are not limited to bleeding, infection, stroke, heart or graft failure with myocardial infarction, dysrhythmia, respiratory failure, kidney or liver injury, bowel or limb ischemia, and death. The calculated STS risk of mortality is <1%, and the calculated STS risk of major morbidity or mortality is 3%. The patient understands these risks and wishes to undergo the operation.     Surgery will be scheduled in the near future.      Procedure Type: Isolated CABG  Perioperative Outcome Estimate %  Operative Mortality 0.472%  Morbidity & Mortality 3.21%  Stroke 0.474%  Renal Failure 0.394%  Reoperation 1.44%  Prolonged Ventilation 1.58%  Deep Sternal Wound Infection 0.105%  Long Hospital Stay (>14 days) 1.25%  Short Hospital Stay (<6 days) 68.7%    Clinical Summary  Planned Surgery: Isolated CABG, Elective, First cardiovascular surgery  Demographics: 73 year old, White, male, 91kg, 167cm, BMI: 32.6 kg/m   Lab Values: Creatinine: 0.92 mg/dL, Hematocrit: 45%, WBC Count: 8.1 10 /?L, Platelet Count: 573020 cells/?L  Substance Abuse: Former smoker  Risk Factors / Comorbidities: Hypertension, Family Hx of CAD  Cardiac Status: NYHA Class I, Ejection Fraction = 55%  Coronary Artery Disease: 2 vessel disease, Proximal LAD Stenosis ? 70%, Stable Angina           History of Present Illness:   This patient is a 73 year old male who presents with glucose resistance, prior  smoker, HLD evaluated for new [place], some chest discomfort, had stress test positive for ischemia. Referred for angiography which revealed mvCAD. Stable disease, returned home, I met with him and his partner via video visit.                Past Medical History:   I have reviewed and updated this patient's past medical history  Past Medical History:   Diagnosis Date    ED (erectile dysfunction)     Hx of colonic polyp 2001             Past Surgical History:   I have reviewed this patient's past surgical history  Past Surgical History:   Procedure Laterality Date    COLONOSCOPY      Reported as having a polyp removed    COLONOSCOPY      Clear by report    COLONOSCOPY N/A 2016    Procedure: COLONOSCOPY;  Surgeon: Juanjo Martínez MD;  Location: [facility]    COLONOSCOPY N/A 2022    Procedure: COLONOSCOPY, FLEXIBLE, WITH LESION REMOVAL USING SNARE;  Surgeon: Jian Mixon DO;  Location: [facility]    COLONOSCOPY WITH CO2 INSUFFLATION N/A 2016    Procedure: COLONOSCOPY WITH CO2 INSUFFLATION;  Surgeon: Juanjo Martínez MD;  Location: [facility]    COLONOSCOPY WITH CO2 INSUFFLATION N/A 2022    Procedure: COLONOSCOPY, WITH CO2 INSUFFLATION;  Surgeon: Jian Mixon DO;  Location: [facility] OR               Social History:   I have reviewed this patient's social history  Social History     Tobacco Use    Smoking status: Former     Packs/day: 0.80     Years: 40.00     Additional pack years: 0.00     Total pack years: 32.00     Types: Cigarettes     Quit date: 2015     Years since quittin.5     Passive exposure: Never    Smokeless tobacco: Never    Tobacco comments:     quit smoking 2015   Substance Use Topics    Alcohol use: Yes     Alcohol/week: 6.0 standard drinks of alcohol     Types: 6 Standard drinks or equivalent per week     Comment: Beer mainly- 5-6 per week             Family History:   I have reviewed this patient's family history  Family History    Problem Relation Age of Onset    Cervical Cancer Mother     Lung Cancer Mother     Heart Disease Father         CHF    Emphysema Father     Coronary Artery Disease Father         MI in 50s    Diabetes No family hx of     Hypertension No family hx of     Breast Cancer No family hx of     Prostate Cancer No family hx of     Mental Illness No family hx of     Osteoporosis No family hx of     Obesity No family hx of              Allergies:     Allergies   Allergen Reactions    Ampicillin              Medications:     Current Outpatient Medications Ordered in Epic   Medication    aspirin 81 MG EC tablet    isosorbide mononitrate (IMDUR) 30 MG 24 hr tablet    nitroGLYcerin (NITROSTAT) 0.4 MG sublingual tablet    rosuvastatin (CRESTOR) 40 MG tablet    tamsulosin (FLOMAX) 0.4 MG capsule     No current Epic-ordered facility-administered medications on file.             Review of Systems:     The 10 point Review of Systems is negative other than noted in the HPI            Physical Exam:     Vitals stable from catheterization lab  Video visit  Alert, comfortable, in NAD         Data:     Lab Results   Component Value Date    WBC 8.1 11/09/2023    HGB 15.2 11/09/2023    HCT 45.1 11/09/2023     11/09/2023     11/09/2023    POTASSIUM 4.5 11/09/2023    CHLORIDE 106 11/09/2023    CO2 23 11/09/2023    BUN 15.8 11/09/2023    CR 0.92 11/09/2023     (H) 11/09/2023    DD 0.48 09/20/2023    AST 25 09/20/2023    ALT 19 09/20/2023    ALKPHOS 65 09/20/2023    BILITOTAL 0.4 09/20/2023    INR 1.12 11/09/2023     CARDIAC CATHETERIZATION:      Mid Cx lesion is 90% stenosed.    Ost LAD to Mid LAD lesion is 70% stenosed.    Prox RCA lesion is 40% stenosed.    Left Main   The vessel is large.      Left Anterior Descending   The vessel is moderate in size.   Ost LAD to Mid LAD lesion is 70% stenosed. The lesion is severely calcified.      Left Circumflex   The vessel is moderate in size.   Mid Cx lesion is 90% stenosed. The  lesion is severely calcified.      First Obtuse Marginal Branch   The vessel is small.      Second Obtuse Marginal Branch   The vessel is large.      Right Coronary Artery   Prox RCA lesion is 40% stenosed. The lesion is mildly calcified.      Right Posterior Atrioventricular Artery   Collaterals   RPAV filled by collaterals from Dist Cx.          TRANSTHORACIC ECHOCARDIOGRAPHY:  Abnormal submaximal exercise stress echocardiogram.     Patient developed significant ECG changes during peak exercise and recovery  period. 3 episodes of non-sustained VT for 3-4 beats during stage 4 exercise.  Diffuse ST segment (~1 mm) depression in inferolateral leads (downsloping in  recovery) with ST segment elevation in AVR (~1 mm) during exercise extending  in to recovery noted.     Patient was only able to reach 81% of the age predicted maximal heart rate due  to dyspnea and no ischemia was identified by imaging at this heart rate.     Normal biventricular size, thickness, and global systolic function at  baseline, LVEF=55-60%.  Normal BP response.  With exercise, LVEF increased and LV cavity size decreased appropriately.  No regional wall motion abnormalities are present at rest or with exercise.  No angina was elicited.  No significant valvular abnormalities are noted on screening Doppler exam.  The aortic root and visualized ascending aorta are normal.    EKG:      Sinus rhythm  No previous ECGs available       CAROTID US:    IMPRESSION:     1. RIGHT ICA: Less than 50% diameter narrowing by grayscale imaging  and sonographic velocity criteria.     2. LEFT ICA:  Less than 50% diameter narrowing by grayscale imaging  and sonographic velocity criteria.    CTA (coronary angiography)    ADDITIONAL FINDINGS:      The proximal ascending aorta is normal in size.   Normal pulmonary venous anatomy. All four pulmonary veins drain into  the left atrium.    There is no left ventricular mass or thrombus.   Normal pericardial thickness. There  is no pericardial effusion.  Please review the separate Radiology report for incidental noncardiac  findings.

## 2023-11-13 NOTE — TELEPHONE ENCOUNTER
Scheduled pre ops appts. Spoke with pt and went over times and locations for schedule. Pt agreed to the plan

## 2023-11-13 NOTE — TELEPHONE ENCOUNTER
RNCC spoke with patient regarding pre operative testing and instructions/education. Patient will have surgery with Dr. López at [facility].

## 2023-11-13 NOTE — TELEPHONE ENCOUNTER
FUTURE VISIT INFORMATION      SURGERY INFORMATION:   Dr. López   Consult: virtual visit 11/10/23    RECORDS REQUESTED FROM:       Primary Care Provider:   Lyubov Almanza PA-C   - marty    Most recent EKG+ Tracin23    Most recent ECHO: 23    Most recent Coronary Angiogram: 23

## 2023-11-14 ENCOUNTER — TELEPHONE (OUTPATIENT)
Dept: CARDIOLOGY | Facility: CLINIC | Age: 73
End: 2023-11-14

## 2023-11-14 ENCOUNTER — PRE VISIT (OUTPATIENT)
Dept: SURGERY | Facility: CLINIC | Age: 73
End: 2023-11-14

## 2023-11-14 ENCOUNTER — ANESTHESIA EVENT (OUTPATIENT)
Dept: SURGERY | Facility: CLINIC | Age: 73
DRG: 236 | End: 2023-11-14
Payer: COMMERCIAL

## 2023-11-14 ENCOUNTER — LAB (OUTPATIENT)
Dept: LAB | Facility: CLINIC | Age: 73
End: 2023-11-14
Attending: PHYSICIAN ASSISTANT
Payer: COMMERCIAL

## 2023-11-14 ENCOUNTER — OFFICE VISIT (OUTPATIENT)
Dept: SURGERY | Facility: CLINIC | Age: 73
End: 2023-11-14
Payer: COMMERCIAL

## 2023-11-14 ENCOUNTER — HOSPITAL ENCOUNTER (OUTPATIENT)
Dept: GENERAL RADIOLOGY | Facility: CLINIC | Age: 73
Discharge: HOME | End: 2023-11-14
Attending: STUDENT IN AN ORGANIZED HEALTH CARE EDUCATION/TRAINING PROGRAM
Payer: COMMERCIAL

## 2023-11-14 ENCOUNTER — HOSPITAL ENCOUNTER (OUTPATIENT)
Dept: CARDIOLOGY | Facility: CLINIC | Age: 73
Discharge: HOME | End: 2023-11-14
Attending: STUDENT IN AN ORGANIZED HEALTH CARE EDUCATION/TRAINING PROGRAM
Payer: COMMERCIAL

## 2023-11-14 VITALS
TEMPERATURE: 97.7 F | OXYGEN SATURATION: 97 % | HEIGHT: 66 IN | HEART RATE: 82 BPM | RESPIRATION RATE: 16 BRPM | DIASTOLIC BLOOD PRESSURE: 81 MMHG | WEIGHT: 206 LBS | SYSTOLIC BLOOD PRESSURE: 127 MMHG | BODY MASS INDEX: 33.11 KG/M2

## 2023-11-14 DIAGNOSIS — I25.10 CORONARY ARTERY DISEASE INVOLVING NATIVE CORONARY ARTERY OF NATIVE HEART WITHOUT ANGINA PECTORIS: ICD-10-CM

## 2023-11-14 DIAGNOSIS — R97.20 ELEVATED PROSTATE SPECIFIC ANTIGEN (PSA): ICD-10-CM

## 2023-11-14 DIAGNOSIS — Z01.818 PRE-OP EVALUATION: Primary | ICD-10-CM

## 2023-11-14 LAB
ALBUMIN UR-MCNC: NEGATIVE MG/DL
APPEARANCE UR: CLEAR
BILIRUB UR QL STRIP: NEGATIVE
COLOR UR AUTO: ABNORMAL
GLUCOSE UR STRIP-MCNC: NEGATIVE MG/DL
HGB UR QL STRIP: NEGATIVE
KETONES UR STRIP-MCNC: NEGATIVE MG/DL
LEUKOCYTE ESTERASE UR QL STRIP: NEGATIVE
LVEF ECHO: NORMAL
MUCOUS THREADS #/AREA URNS LPF: PRESENT /LPF
NITRATE UR QL: NEGATIVE
PH UR STRIP: 6 [PH] (ref 5–7)
RBC URINE: <1 /HPF
SP GR UR STRIP: 1.01 (ref 1–1.03)
UROBILINOGEN UR STRIP-MCNC: NORMAL MG/DL
WBC URINE: 1 /HPF

## 2023-11-14 PROCEDURE — 99204 OFFICE O/P NEW MOD 45 MIN: CPT | Performed by: NURSE PRACTITIONER

## 2023-11-14 PROCEDURE — 71046 X-RAY EXAM CHEST 2 VIEWS: CPT | Mod: 26 | Performed by: RADIOLOGY

## 2023-11-14 PROCEDURE — 71046 X-RAY EXAM CHEST 2 VIEWS: CPT

## 2023-11-14 PROCEDURE — 93306 TTE W/DOPPLER COMPLETE: CPT | Mod: 26 | Performed by: INTERNAL MEDICINE

## 2023-11-14 PROCEDURE — 93306 TTE W/DOPPLER COMPLETE: CPT

## 2023-11-14 PROCEDURE — 81003 URINALYSIS AUTO W/O SCOPE: CPT

## 2023-11-14 ASSESSMENT — PAIN SCALES - GENERAL: PAINLEVEL: NO PAIN (0)

## 2023-11-14 ASSESSMENT — LIFESTYLE VARIABLES: TOBACCO_USE: 1

## 2023-11-14 NOTE — PATIENT INSTRUCTIONS
Preparing for Your Surgery      Name:  Aquiles Mckeon   MRN:  [id number]   :  1950   Today's Date:  2023       Arriving for surgery:  Surgery date:  23  Arrival time:  To be determined    Please come to:     Please come to:      [facility] [address]  [address] [place]  [address]      The [facility] Patient /Visitor Ramp is located at [address]. Patients and visitors who self-park will receive the reduced hospital parking rate. If the Patient /Visitor Ramp is full, please follow the signs to the  parking located at the main hospital entrance.     parking is available ( 24 hours/ 7 days a week)    Discounted parking pass options are available for patients and visitors. They can be purchased at the [employer] desk at the main hospital entrance.    -    Stop at the security desk and they will direct surgery patients to the 3rd floor Surgery Waiting Room. [phone]     -  If you are in need of directions, wheelchair or escort please stop at the Information/security desk in the lobby.       What can I eat or drink?  -  You may eat and drink normally up to 8 hours prior to arrival time.   -  You may have clear liquids until 2 hours prior to arrival time.     Examples of clear liquids:  Water  Clear broth  Juices (apple, white grape, white cranberry  and cider) without pulp  Noncarbonated, powder based beverages  (lemonade and Satish-Aid)  Sodas (Sprite, 7-Up, ginger ale and seltzer)  Coffee or tea (without milk or cream)  Gatorade    -  No Alcohol or cannabis products for at least 24 hours before surgery.     Which medicines can I take?    Hold Multivitamins for 7 days before surgery.  Hold Supplements for 7 days before surgery.  Hold Ibuprofen (Advil, Motrin) for 7 day(s) before surgery--unless otherwise directed by surgeon.  Hold Naproxen (Aleve) for 7 days before surgery.    -  DO NOT take these  medications the day of surgery:  Aspirin    -  PLEASE TAKE these medications per your usual routine:  Isosorbide (Imdur)  Rosuvastatin (Crestor)  Tamsulosin (Flomax)    How do I prepare myself?  - Please take 2 showers (one the night prior to surgery and one the morning of surgery) using Scrubcare or Hibiclens soap.    Use this soap only from the neck to your toes.     Leave the soap on your skin for one minute--then rinse thoroughly.      You may use your own shampoo and conditioner. No other hair products.   - Please remove all jewelry and body piercings.  - No lotions, deodorants or fragrance.  - No makeup or fingernail polish.   - Bring your ID and insurance card.    -If you use a CPAP Machine please bring the CPAP Machine and tubing to hospital.    -If you have a Deep Brain Stimulator, Spinal Cord Stimulator, or any Neuro Stimulator device---you must bring the remote control to the hospital.      ALL PATIENTS GOING HOME THE SAME DAY OF SURGERY ARE REQUIRED TO HAVE A RESPONSIBLE ADULT TO DRIVE AND BE IN ATTENDANCE WITH THEM FOR 24 HOURS FOLLOWING SURGERY.    Covid testing policy as of 12/06/2022  Your surgeon will notify and schedule you for a COVID test if one is needed before surgery--please direct any questions or COVID symptoms to your surgeon      Questions or Concerns:    - For any questions regarding the day of surgery or your hospital stay, please contact the Pre Admission Nursing Office at [phone].       - If you have health changes between today and your surgery, please call your surgeon.       - For questions after surgery, please call your surgeons office.           Current Visitor Guidelines    You may have 2 visitors in the pre op area.    Visiting hours: 8 a.m. to 8:30 p.m.    You may have four visitors during your inpatient hospital stay.    Patients confirmed or suspected to have symptoms of COVID 19 or flu:     No visitors allowed for adult patients.   Children (under age 18) can have 1  named visitor.     People who are sick or showing symptoms of COVID 19 or flu:    Are not allowed to visit patients--we can only make exceptions in special situations.       Please follow these guidelines for your visit:          Please maintain social distance          Masking is optional--however at times you may be asked to wear a mask for the safety of yourself and others     Clean your hands with alcohol hand . Do this when you arrive at and leave the building and patient room,    And again after you touch your mask or anything in the room.     Go directly to and from the room you are visiting.     Stay in the patient s room during your visit. Limit going to other places in the hospital as much as possible     Leave bags and jackets at home or in the car.     For everyone s health, please don t come and go during your visit. That includes for smoking   during your visit.

## 2023-11-14 NOTE — TELEPHONE ENCOUNTER
Per task, call pt and confirm 11/16 surgery with Dr. Mulvihill. Talked with pt and confirmed. Will call if anything changes

## 2023-11-14 NOTE — H&P
Pre-Operative H & P     CC:  Preoperative exam to assess for increased cardiopulmonary risk while undergoing surgery and anesthesia.    Date of Encounter: 11/14/2023  Primary Care Physician:  Lyubov Almanza     Reason for visit: Pre-operative evaluation    HPI  Aquiles Mckeon is a 73 year old male who presents for pre-operative H & P in preparation for  Procedure Information       Case: [id number] Date: 11/16/23    Procedure: CORONARY ARTERY BYPASS GRAFT (CABG) WITH Left internal mammary artery AND associated procedures (Chest)    Anesthesia type: General    Diagnosis: CAD (coronary artery disease) [I25.10]    Pre-op diagnosis: CAD (coronary artery disease) [I25.10]    [facility]: [facility]    Providers: Mulvihill, Michael, MD            Karin Mckeon is a 73 year old male with PMH significant for HL, previous smoker, glucose resistance and new onset OLIVO, with some chest discomfort. He had a stress test that was positive for ischemia. Angiogram showed  severe multivessel coronary artery disease and preserved LVEF. He has consulted with Dr. Mulvihill and presents today in preparation for the above recommened procedure.            History is obtained from the patient and chart review    Hx of abnormal bleeding or anti-platelet use: yes - aspirin      Past Medical History  Past Medical History:   Diagnosis Date    ED (erectile dysfunction)     Hx of colonic polyp 01/01/2001       Past Surgical History  Past Surgical History:   Procedure Laterality Date    COLONOSCOPY  2001    Reported as having a polyp removed    COLONOSCOPY  2004    Clear by report    COLONOSCOPY N/A 1/29/2016    Procedure: COLONOSCOPY;  Surgeon: Juanjo Martínez MD;  Location: [facility]    COLONOSCOPY N/A 4/19/2022    Procedure: COLONOSCOPY, FLEXIBLE, WITH LESION REMOVAL USING SNARE;  Surgeon: Jain Mixon DO;  Location: [facility]    COLONOSCOPY WITH CO2 INSUFFLATION N/A 1/29/2016    Procedure: COLONOSCOPY WITH CO2 INSUFFLATION;   Surgeon: Juanjo Martínez MD;  Location: [facility]    COLONOSCOPY WITH CO2 INSUFFLATION N/A 2022    Procedure: COLONOSCOPY, WITH CO2 INSUFFLATION;  Surgeon: Jian Mixon DO;  Location: [facility]       Prior to Admission Medications  Current Outpatient Medications   Medication Sig Dispense Refill    aspirin 81 MG EC tablet Take 1 tablet (81 mg) by mouth daily (Patient taking differently: Take 81 mg by mouth every morning)      isosorbide mononitrate (IMDUR) 30 MG 24 hr tablet Take 1 tablet (30 mg) by mouth daily (Patient taking differently: Take 30 mg by mouth every morning) 90 tablet 3    nitroGLYcerin (NITROSTAT) 0.4 MG sublingual tablet For chest pain place 1 tablet under the tongue every 5 minutes for 3 doses. If symptoms persist 5 minutes after 1st dose call 911. 25 tablet 3    rosuvastatin (CRESTOR) 40 MG tablet Take 1 tablet (40 mg) by mouth daily (Patient taking differently: Take 40 mg by mouth every morning) 90 tablet 3    tamsulosin (FLOMAX) 0.4 MG capsule Take 1 capsule (0.4 mg) by mouth daily (Patient taking differently: Take 0.4 mg by mouth every morning) 90 capsule 3       Allergies  Allergies   Allergen Reactions    Ampicillin        Social History  Social History     Socioeconomic History    Marital status:      Spouse name: Not on file    Number of children: Not on file    Years of education: Not on file    Highest education level: Not on file   Occupational History    Not on file   Tobacco Use    Smoking status: Former     Packs/day: 0.80     Years: 40.00     Additional pack years: 0.00     Total pack years: 32.00     Types: Cigarettes     Quit date: 2015     Years since quittin.5     Passive exposure: Never    Smokeless tobacco: Never    Tobacco comments:     quit smoking 2015   Vaping Use    Vaping Use: Never used   Substance and Sexual Activity    Alcohol use: Yes     Alcohol/week: 10.0 standard drinks of alcohol     Types: 10 Standard drinks or equivalent  "per week    Drug use: No    Sexual activity: Not Currently     Partners: Female     Birth control/protection: Male Surgical     Comment: Vasectomy   Other Topics Concern    Parent/sibling w/ CABG, MI or angioplasty before 65F 55M? No   Social History Narrative    Not on file     Social Determinants of Health     Financial Resource Strain: Not on file   Food Insecurity: Not on file   Transportation Needs: Not on file   Physical Activity: Not on file   Stress: Not on file   Social Connections: Not on file   Interpersonal Safety: Low Risk  (9/20/2023)    Interpersonal Safety     Do you feel physically and emotionally safe where you currently live?: Yes     Within the past 12 months, have you been hit, slapped, kicked or otherwise physically hurt by someone?: No     Within the past 12 months, have you been humiliated or emotionally abused in other ways by your partner or ex-partner?: No   Housing Stability: Not on file       Family History  Family History   Problem Relation Age of Onset    Cervical Cancer Mother     Lung Cancer Mother     Heart Disease Father         CHF    Emphysema Father     Coronary Artery Disease Father         MI in 50s    Diabetes No family hx of     Hypertension No family hx of     Breast Cancer No family hx of     Prostate Cancer No family hx of     Mental Illness No family hx of     Osteoporosis No family hx of     Obesity No family hx of        Review of Systems  The complete review of systems is negative other than noted in the HPI or here.   Anesthesia Evaluation   Pt has had prior anesthetic. Type: General and MAC.    No history of anesthetic complications       ROS/MED HX  ENT/Pulmonary:     (+)                tobacco use, Past use, 1 packs/day, 32  Pack-Year Hx,                     Neurologic: Comment: Hx of [place] palsy (2021) residual left side facial \" sense of heaviness\"      Cardiovascular:     (+) Dyslipidemia - -  CAD -  - -   Taking blood thinners Pt has received instructions: " "Instructions Given to patient: Hold asa DOS.      OLIVO.                      Previous cardiac testing   Echo: Date: 11/2023 Results:    Stress Test:  Date: Results:    ECG Reviewed:  Date: 11/2023 Results:    Cath:  Date: 10/2023 Results:      METS/Exercise Tolerance: 4 - Raking leaves, gardening    Hematologic:  - neg hematologic  ROS     Musculoskeletal: Comment: Right knee replacement 2016    (+)  arthritis,   fracture, Fractures: Fracture jaw >40 years ago.         GI/Hepatic:  - neg GI/hepatic ROS     Renal/Genitourinary:  - neg Renal ROS     Endo:  - neg endo ROS     Psychiatric/Substance Use:  - neg psychiatric ROS     Infectious Disease:  - neg infectious disease ROS     Malignancy:  - neg malignancy ROS     Other:            /81 (BP Location: Right arm, Patient Position: Sitting, Cuff Size: Adult Regular)   Pulse 82   Temp 97.7  F (36.5  C) (Oral)   Resp 16   Ht 1.664 m (5' 5.5\")   Wt 93.4 kg (206 lb)   SpO2 97%   BMI 33.76 kg/m      Physical Exam   Constitutional: Awake, alert, cooperative, no apparent distress, and appears stated age.  Eyes: Pupils equal, round and reactive to light, extra ocular muscles intact, sclera clear, conjunctiva normal.  HENT: Normocephalic, oral pharynx with moist mucus membranes, good dentition. No goiter appreciated.   Respiratory: Clear to auscultation bilaterally, no crackles or wheezing.  Cardiovascular: Regular rate and rhythm, normal S1 and S2, and no murmur noted.  Carotids +2, no bruits. No edema. Palpable pulses to radial  DP and PT arteries.   GI: Normal bowel sounds, soft, non-distended, non-tender, no masses palpated, no hepatosplenomegaly.  Surgical scars: healed  Lymph/Hematologic: No cervical lymphadenopathy and no supraclavicular lymphadenopathy.  Genitourinary:  deferred   Skin: Warm and dry.  No rashes at anticipated surgical site.   Musculoskeletal: Full ROM of neck. There is no redness, warmth, or swelling of the joints. Gross motor strength is " normal.    Neurologic: Awake, alert, oriented to name, place and time. Cranial nerves II-XII are grossly intact. Gait is normal.   Neuropsychiatric: Calm, cooperative. Normal affect.     Prior Labs/Diagnostic Studies   All labs and imaging personally reviewed     CARDIAC CATHETERIZATION: 2023    Mid Cx lesion is 90% stenosed.     Ost LAD to Mid LAD lesion is 70% stenosed.     Prox RCA lesion is 40% stenosed.   Left Main   The vessel is large.   Left Anterior Descending The vessel is moderate in size. Ost LAD to Mid LAD lesion is 70% stenosed. The lesion is severely calcified.   Left Circumflex The vessel is moderate in size.   Mid Cx lesion is 90% stenosed. The lesion is severely calcified.   First Obtuse Marginal Branch The vessel is small.   Second Obtuse Marginal Branch The vessel is large.   Right Coronary Artery Prox RCA lesion is 40% stenosed. The lesion is mildly calcified.   Right Posterior Atrioventricular Artery Collaterals   RPAV filled by collaterals from Dist Cx.       CTA angiogram 2023  IMPRESSION:  1.  Severe coronary atherosclerosis with subtotal occlusion of the  mid-distal RCA and moderate stenosis in the LCx and LAD. The patient  is currently scheduled to undergo angiography 23.  2.  Total Agatston score 4297 placing the patient in the 98th  percentile when compared to an age- and gender-matched control group.  3.  Please review the separate Radiology report for incidental  noncardiac findings.     FINDINGS:     CORONARY CALCIUM SCORE     Total Agatston calcium score: 4297   Left main: 349  Left anterior descendin  Left circumflex: 888  Right coronary artery: 1947   This places the patient in the 98th  percentile when compared to an  age- and gender-matched control group.       TRANSTHORACIC ECHOCARDIOGRAPHY: 10/2023  Abnormal submaximal exercise stress echocardiogram. Patient developed significant ECG changes during peak exercise and recovery period. 3 episodes of  non-sustained VT for 3-4 beats during stage 4 exercise. Diffuse ST segment (~1 mm) depression in inferolateral leads (downsloping in recovery) with ST segment elevation in AVR (~1 mm) during exercise extending in to recovery noted.   Patient was only able to reach 81% of the age predicted maximal heart rate due to dyspnea and no ischemia was identified by imaging at this heart rate. Normal biventricular size, thickness, and global systolic function at baseline, LVEF=55-60%. Normal BP response. With exercise, LVEF increased and LV cavity size decreased appropriately. No regional wall motion abnormalities are present at rest or with exercise. No angina was elicited. No significant valvular abnormalities are noted on screening Doppler exam. The aortic root and visualized ascending aorta are normal.     EKG:   Sinus rhythm  No previous ECGs available      CAROTID US:   IMPRESSION:   1. RIGHT ICA: Less than 50% diameter narrowing by grayscale imaging and sonographic velocity criteria.   2. LEFT ICA:  Less than 50% diameter narrowing by grayscale imaging and sonographic velocity criteria.   CTA (coronary angiography)   ADDITIONAL FINDINGS:    The proximal ascending aorta is normal in size.  Normal pulmonary venous anatomy. All four pulmonary veins drain into the left atrium.   There is no left ventricular mass or thrombus.  Normal pericardial thickness. There is no pericardial     EKG/ stress test - if available please see in ROS above   No results found.    The patient's records and results personally reviewed by this provider.     Outside records reviewed from: Care Everywhere    LAB/DIAGNOSTIC STUDIES TODAY:    Labs ordered by surgical team  Patient lab scheduled at [facility] later today  Type and Screen   CBC   BMP    Assessment    Aquiles Mckeon is a 73 year old male seen as a PAC referral for risk assessment and optimization for anesthesia.    Plan/Recommendations  Pt will be optimized for the proposed procedure.   "See below for details on the assessment, risk, and preoperative recommendations    NEUROLOGY  - No history of TIA, CVA or seizure  Hx of bells palsy ( 2021) residual left side facial \" sense of heaviness\"  -Post Op delirium risk factors:  No risk identified    ENT  - No current airway concerns.  Will need to be reassessed day of surgery.  Mallampati: II  TM: > 3    CARDIAC  - No history of Hypertension and Afib  - CAD  Multi-vessel disease - .procedure scheduled as above  Taking baby asa daily. Will hold DOS  - Hyperlipidemia- on statins  - OLIVO- anginal equivalent- managed on Isosorbide with improvement in symptoms.     - METS (Metabolic Equivalents) Has limited his activity since recent diagnosis.   Patient performs 4 or more METS exercise without symptoms            Total Score: 0          PULMONARY    APRIL Low Risk  *This score is based on incomplete data *           Total Score: 2*    APRIL: Over 50 ys old    APRIL: Male        Criteria with incomplete data:    APRIL: Observed stopped breathing      - Denies asthma or inhaler use  - Tobacco History    History   Smoking Status    Former    Packs/day: 0.80    Years: 40.00    Types: Cigarettes    Quit date: 5/1/2015   Smokeless Tobacco    Never       GI    PONV Medium Risk  Total Score: 2           1 AN PONV: Patient is not a current smoker    1 AN PONV: Intended Post Op Opioids        /RENAL  - Baseline Creatinine  WNL    ENDOCRINE    - BMI: Estimated body mass index is 33.76 kg/m  as calculated from the following:    Height as of this encounter: 1.664 m (5' 5.5\").    Weight as of this encounter: 93.4 kg (206 lb).  Obesity (BMI >30)  - No history of Diabetes Mellitus    HEME  VTE Low Risk 0.5%            Total Score: 3    VTE: Greater than 59 yrs old    VTE: Male      - Platelet disfunction second to Aspirin (Joan, many others)  Hold DOS per Surgical team recommendations    MSK  Patient is NOT Frail            Total Score: 0      Hx of arthritis s/p Right knee " replacement 2016    Different anesthesia methods/types have been discussed with the patient, but they are aware that the final plan will be decided by the assigned anesthesia provider on the date of service.      The patient is optimized for their procedure. AVS with information on surgery time/arrival time, meds and NPO status given by nursing staff. No further diagnostic testing indicated.      On the day of service:     Prep time: 15 minutes  Visit time: 20 minutes  Documentation time: 10 minutes  ------------------------------------------  Total time: 45 minutes      DOUG Ramirez CNP  Preoperative Assessment Center  [facility]  Phone: [phone]  Fax: [phone]

## 2023-11-15 NOTE — ANESTHESIA PREPROCEDURE EVALUATION
Pre-Operative H & P     CC:  Preoperative exam to assess for increased cardiopulmonary risk while undergoing surgery and anesthesia.    Date of Encounter: 11/14/2023  Primary Care Physician:  Lyubov Almanza     Reason for visit: Pre-operative evaluation    HPI  Aquiles Mckeon is a 73 year old male who presents for pre-operative H & P in preparation for  Procedure Information       Case: [id number] Date/Time: 11/16/23 0730    Procedure: CORONARY ARTERY BYPASS GRAFT (CABG) WITH Left internal mammary artery AND associated procedures (Chest)    Anesthesia type: General    Diagnosis: CAD (coronary artery disease) [I25.10]    Pre-op diagnosis: CAD (coronary artery disease) [I25.10]    Location:  OR  /  OR    Providers: Mulvihill, Michael, MD            Karin Mckeon is a 73 year old male with PMH significant for HL, previous smoker, glucose resistance and new onset OLIVO, with some chest discomfort. He had a stress test that was positive for ischemia. Angiogram showed  severe multivessel coronary artery disease and preserved LVEF. He has consulted with Dr. Mulvihill and presents today in preparation for the above recommened procedure.            History is obtained from the patient and chart review    Hx of abnormal bleeding or anti-platelet use: yes - aspirin      Past Medical History  Past Medical History:   Diagnosis Date    ED (erectile dysfunction)     Hx of colonic polyp 01/01/2001       Past Surgical History  Past Surgical History:   Procedure Laterality Date    COLONOSCOPY  2001    Reported as having a polyp removed    COLONOSCOPY  2004    Clear by report    COLONOSCOPY N/A 1/29/2016    Procedure: COLONOSCOPY;  Surgeon: Juanjo Martínez MD;  Location: [facility] OR    COLONOSCOPY N/A 4/19/2022    Procedure: COLONOSCOPY, FLEXIBLE, WITH LESION REMOVAL USING SNARE;  Surgeon: Jian Mixon DO;  Location: [facility] OR    COLONOSCOPY WITH CO2 INSUFFLATION N/A 1/29/2016    Procedure: COLONOSCOPY WITH CO2  INSUFFLATION;  Surgeon: Juanjo Martínez MD;  Location: [facility] OR    COLONOSCOPY WITH CO2 INSUFFLATION N/A 2022    Procedure: COLONOSCOPY, WITH CO2 INSUFFLATION;  Surgeon: Jian Mixon DO;  Location: [facility] OR       Prior to Admission Medications  No current outpatient medications on file.       Allergies  Allergies   Allergen Reactions    Ampicillin        Social History  Social History     Socioeconomic History    Marital status:      Spouse name: Not on file    Number of children: Not on file    Years of education: Not on file    Highest education level: Not on file   Occupational History    Not on file   Tobacco Use    Smoking status: Former     Packs/day: 0.80     Years: 40.00     Additional pack years: 0.00     Total pack years: 32.00     Types: Cigarettes     Quit date: 2015     Years since quittin.5     Passive exposure: Never    Smokeless tobacco: Never    Tobacco comments:     quit smoking 2015   Vaping Use    Vaping Use: Never used   Substance and Sexual Activity    Alcohol use: Yes     Alcohol/week: 10.0 standard drinks of alcohol     Types: 10 Standard drinks or equivalent per week    Drug use: No    Sexual activity: Not Currently     Partners: Female     Birth control/protection: Male Surgical     Comment: Vasectomy   Other Topics Concern    Parent/sibling w/ CABG, MI or angioplasty before 65F 55M? No   Social History Narrative    Not on file     Social Determinants of Health     Financial Resource Strain: Not on file   Food Insecurity: Not on file   Transportation Needs: Not on file   Physical Activity: Not on file   Stress: Not on file   Social Connections: Not on file   Interpersonal Safety: Low Risk  (2023)    Interpersonal Safety     Do you feel physically and emotionally safe where you currently live?: Yes     Within the past 12 months, have you been hit, slapped, kicked or otherwise physically hurt by someone?: No     Within the past 12 months, have you  "been humiliated or emotionally abused in other ways by your partner or ex-partner?: No   Housing Stability: Not on file       Family History  Family History   Problem Relation Age of Onset    Cervical Cancer Mother     Lung Cancer Mother     Heart Disease Father         CHF    Emphysema Father     Coronary Artery Disease Father         MI in 50s    Diabetes No family hx of     Hypertension No family hx of     Breast Cancer No family hx of     Prostate Cancer No family hx of     Mental Illness No family hx of     Osteoporosis No family hx of     Obesity No family hx of        Review of Systems  The complete review of systems is negative other than noted in the HPI or here.   Anesthesia Evaluation    Type: General and MAC.        ROS/MED HX  ENT/Pulmonary:     (+)                tobacco use, Past use, 1 packs/day, 32  Pack-Year Hx,                     Neurologic: Comment: Hx of [place] palsy (2021) residual left side facial \" sense of heaviness\"      Cardiovascular:     (+) Dyslipidemia - -  CAD -  - -   Taking blood thinners Pt has received instructions: Instructions Given to patient: Hold asa DOS.      OLIVO.                      Previous cardiac testing   Echo: Date: 11/2023 Results:    Stress Test:  Date: Results:    ECG Reviewed:  Date: 11/2023 Results:    Cath:  Date: 10/2023 Results:      METS/Exercise Tolerance: 4 - Raking leaves, gardening    Hematologic:  - neg hematologic  ROS     Musculoskeletal: Comment: Right knee replacement 2016    (+)  arthritis,   fracture, Fractures: Fracture jaw >40 years ago.         GI/Hepatic:  - neg GI/hepatic ROS     Renal/Genitourinary:  - neg Renal ROS     Endo:  - neg endo ROS     Psychiatric/Substance Use:  - neg psychiatric ROS     Infectious Disease:  - neg infectious disease ROS     Malignancy:  - neg malignancy ROS     Other:            BP (!) 120/91   Pulse 87   Temp 36.7  C (98.1  F) (Oral)   Resp 18   Ht 1.664 m (5' 5.5\")   Wt 91.8 kg (202 lb 6.1 oz)   SpO2 97%   " BMI 33.17 kg/m      Physical Exam   Constitutional: Awake, alert, cooperative, no apparent distress, and appears stated age.  Eyes: Pupils equal, round and reactive to light, extra ocular muscles intact, sclera clear, conjunctiva normal.  HENT: Normocephalic, oral pharynx with moist mucus membranes, good dentition. No goiter appreciated.   Respiratory: Clear to auscultation bilaterally, no crackles or wheezing.  Cardiovascular: Regular rate and rhythm, normal S1 and S2, and no murmur noted.  Carotids +2, no bruits. No edema. Palpable pulses to radial  DP and PT arteries.   GI: Normal bowel sounds, soft, non-distended, non-tender, no masses palpated, no hepatosplenomegaly.  Surgical scars: healed  Lymph/Hematologic: No cervical lymphadenopathy and no supraclavicular lymphadenopathy.  Genitourinary:  deferred   Skin: Warm and dry.  No rashes at anticipated surgical site.   Musculoskeletal: Full ROM of neck. There is no redness, warmth, or swelling of the joints. Gross motor strength is normal.    Neurologic: Awake, alert, oriented to name, place and time. Cranial nerves II-XII are grossly intact. Gait is normal.   Neuropsychiatric: Calm, cooperative. Normal affect.     Prior Labs/Diagnostic Studies   All labs and imaging personally reviewed     CARDIAC CATHETERIZATION: 11/9/2023    Mid Cx lesion is 90% stenosed.     Ost LAD to Mid LAD lesion is 70% stenosed.     Prox RCA lesion is 40% stenosed.   Left Main   The vessel is large.   Left Anterior Descending The vessel is moderate in size. Ost LAD to Mid LAD lesion is 70% stenosed. The lesion is severely calcified.   Left Circumflex The vessel is moderate in size.   Mid Cx lesion is 90% stenosed. The lesion is severely calcified.   First Obtuse Marginal Branch The vessel is small.   Second Obtuse Marginal Branch The vessel is large.   Right Coronary Artery Prox RCA lesion is 40% stenosed. The lesion is mildly calcified.   Right Posterior Atrioventricular Artery  Collaterals   RPAV filled by collaterals from Dist Cx.       CTA angiogram 2023  IMPRESSION:  1.  Severe coronary atherosclerosis with subtotal occlusion of the  mid-distal RCA and moderate stenosis in the LCx and LAD. The patient  is currently scheduled to undergo angiography 23.  2.  Total Agatston score 4297 placing the patient in the 98th  percentile when compared to an age- and gender-matched control group.  3.  Please review the separate Radiology report for incidental  noncardiac findings.     FINDINGS:     CORONARY CALCIUM SCORE     Total Agatston calcium score: 4297   Left main: 349  Left anterior descendin  Left circumflex: 888  Right coronary artery: 1947   This places the patient in the 98th  percentile when compared to an  age- and gender-matched control group.       TRANSTHORACIC ECHOCARDIOGRAPHY: 10/2023  Abnormal submaximal exercise stress echocardiogram. Patient developed significant ECG changes during peak exercise and recovery period. 3 episodes of non-sustained VT for 3-4 beats during stage 4 exercise. Diffuse ST segment (~1 mm) depression in inferolateral leads (downsloping in recovery) with ST segment elevation in AVR (~1 mm) during exercise extending in to recovery noted.   Patient was only able to reach 81% of the age predicted maximal heart rate due to dyspnea and no ischemia was identified by imaging at this heart rate. Normal biventricular size, thickness, and global systolic function at baseline, LVEF=55-60%. Normal BP response. With exercise, LVEF increased and LV cavity size decreased appropriately. No regional wall motion abnormalities are present at rest or with exercise. No angina was elicited. No significant valvular abnormalities are noted on screening Doppler exam. The aortic root and visualized ascending aorta are normal.     EKG:   Sinus rhythm  No previous ECGs available      CAROTID US:   IMPRESSION:   1. RIGHT ICA: Less than 50% diameter narrowing by  "grayscale imaging and sonographic velocity criteria.   2. LEFT ICA:  Less than 50% diameter narrowing by grayscale imaging and sonographic velocity criteria.   CTA (coronary angiography)   ADDITIONAL FINDINGS:    The proximal ascending aorta is normal in size.  Normal pulmonary venous anatomy. All four pulmonary veins drain into the left atrium.   There is no left ventricular mass or thrombus.  Normal pericardial thickness. There is no pericardial     EKG/ stress test - if available please see in ROS above   No results found.    The patient's records and results personally reviewed by this provider.     Outside records reviewed from: Care Everywhere    LAB/DIAGNOSTIC STUDIES TODAY:    Labs ordered by surgical team  Patient lab scheduled at [facility] later today  Type and Screen   CBC   BMP    Assessment    Aquiles Mckeon is a 73 year old male seen as a PAC referral for risk assessment and optimization for anesthesia.    Plan/Recommendations  Pt will be optimized for the proposed procedure.  See below for details on the assessment, risk, and preoperative recommendations    NEUROLOGY  - No history of TIA, CVA or seizure  Hx of bells palsy ( 2021) residual left side facial \" sense of heaviness\"  -Post Op delirium risk factors:  No risk identified    ENT  - No current airway concerns.  Will need to be reassessed day of surgery.  Mallampati: II  TM: > 3    CARDIAC  - No history of Hypertension and Afib  - CAD  Multi-vessel disease - .procedure scheduled as above  Taking baby asa daily. Will hold DOS  - Hyperlipidemia- on statins  - OLIVO- anginal equivalent- managed on Isosorbide with improvement in symptoms.     - METS (Metabolic Equivalents) Has limited his activity since recent diagnosis.   Patient performs 4 or more METS exercise without symptoms            Total Score: 0          PULMONARY    APRIL Low Risk  *This score is based on incomplete data *           Total Score: 2*    APRIL: Over 50 ys old    APRIL: Male        " "Criteria with incomplete data:    APRIL: Observed stopped breathing      - Denies asthma or inhaler use  - Tobacco History    History   Smoking Status    Former    Packs/day: 0.80    Years: 40.00    Types: Cigarettes    Quit date: 5/1/2015   Smokeless Tobacco    Never       GI    PONV Medium Risk  Total Score: 2           1 AN PONV: Patient is not a current smoker    1 AN PONV: Intended Post Op Opioids        /RENAL  - Baseline Creatinine  WNL    ENDOCRINE    - BMI: Estimated body mass index is 33.17 kg/m  as calculated from the following:    Height as of this encounter: 1.664 m (5' 5.5\").    Weight as of this encounter: 91.8 kg (202 lb 6.1 oz).  Obesity (BMI >30)  - No history of Diabetes Mellitus    HEME  VTE Low Risk 0.5%            Total Score: 3    VTE: Greater than 59 yrs old    VTE: Male      - Platelet disfunction second to Aspirin (Joan, many others)  Hold DOS per Surgical team recommendations    MSK  Patient is NOT Frail            Total Score: 0      Hx of arthritis s/p Right knee replacement 2016    Different anesthesia methods/types have been discussed with the patient, but they are aware that the final plan will be decided by the assigned anesthesia provider on the date of service.      The patient is optimized for their procedure. AVS with information on surgery time/arrival time, meds and NPO status given by nursing staff. No further diagnostic testing indicated.      On the day of service:     Prep time: 15 minutes  Visit time: 20 minutes  Documentation time: 10 minutes  ------------------------------------------  Total time: 45 minutes      DOUG Ramirez CNP  Preoperative Assessment Center  [facility]  Phone: [phone]  Fax: [phone]    Physical Exam    Airway        Mallampati: I   TM distance: > 3 FB   Neck ROM: full   Mouth opening: > 3 cm    Respiratory Devices and Support         Dental       (+) Minor Abnormalities - some " fillings, tiny chips      Cardiovascular          Rhythm and rate: regular and normal     Pulmonary           (+) decreased breath sounds           Anesthesia Plan    ASA Status:  4    NPO Status:  NPO Appropriate    Anesthesia Type: General.     - Airway: ETT   Induction: Intravenous.      Techniques and Equipment:     - Lines/Monitors: 2nd IV, Arterial Line, Central Line, PAC, CVP, BIS, NIRS, LAYNE            LAYNE Absolute Contra-indication: NONE            LAYNE Relative Contra-indication: NONE     Consents    Anesthesia Plan(s) and associated risks, benefits, and realistic alternatives discussed. Questions answered and patient/representative(s) expressed understanding.     - Discussed:     - Discussed with:  Patient      - Extended Intubation/Ventilatory Support Discussed: Yes.      - Patient is DNR/DNI Status: No     Use of blood products discussed: No .     Postoperative Care    Pain management: Multi-modal analgesia.   PONV prophylaxis: Ondansetron (or other 5HT-3)     Comments:

## 2023-11-16 ENCOUNTER — TELEPHONE (OUTPATIENT)
Dept: CARDIOLOGY | Facility: CLINIC | Age: 73
End: 2023-11-16

## 2023-11-16 ENCOUNTER — APPOINTMENT (OUTPATIENT)
Dept: GENERAL RADIOLOGY | Facility: CLINIC | Age: 73
DRG: 236 | End: 2023-11-16
Attending: PHYSICIAN ASSISTANT
Payer: COMMERCIAL

## 2023-11-16 ENCOUNTER — ANESTHESIA (OUTPATIENT)
Dept: SURGERY | Facility: CLINIC | Age: 73
DRG: 236 | End: 2023-11-16
Payer: COMMERCIAL

## 2023-11-16 ENCOUNTER — HOSPITAL ENCOUNTER (INPATIENT)
Facility: CLINIC | Age: 73
LOS: 5 days | Discharge: HOME | DRG: 236 | End: 2023-11-21
Attending: STUDENT IN AN ORGANIZED HEALTH CARE EDUCATION/TRAINING PROGRAM | Admitting: STUDENT IN AN ORGANIZED HEALTH CARE EDUCATION/TRAINING PROGRAM
Payer: COMMERCIAL

## 2023-11-16 DIAGNOSIS — Z95.1 S/P CABG (CORONARY ARTERY BYPASS GRAFT): ICD-10-CM

## 2023-11-16 DIAGNOSIS — I25.10 CORONARY ARTERY DISEASE INVOLVING NATIVE CORONARY ARTERY OF NATIVE HEART, UNSPECIFIED WHETHER ANGINA PRESENT: Primary | ICD-10-CM

## 2023-11-16 LAB
ABO/RH(D): NORMAL
ALBUMIN SERPL BCG-MCNC: 3 G/DL (ref 3.5–5.2)
ALLEN'S TEST: ABNORMAL
ALP SERPL-CCNC: 41 U/L (ref 40–150)
ALT SERPL W P-5'-P-CCNC: 28 U/L (ref 0–70)
ANION GAP SERPL CALCULATED.3IONS-SCNC: 12 MMOL/L (ref 7–15)
ANTIBODY SCREEN: NEGATIVE
APTT PPP: 25 SECONDS (ref 22–38)
APTT PPP: 29 SECONDS (ref 22–38)
AST SERPL W P-5'-P-CCNC: 39 U/L (ref 0–45)
BASE EXCESS BLDA CALC-SCNC: -0.5 MMOL/L (ref -9.6–2)
BASE EXCESS BLDA CALC-SCNC: -0.5 MMOL/L (ref -9.6–2)
BASE EXCESS BLDA CALC-SCNC: -1.5 MMOL/L (ref -9.6–2)
BASE EXCESS BLDA CALC-SCNC: -1.7 MMOL/L (ref -9.6–2)
BASE EXCESS BLDA CALC-SCNC: -1.8 MMOL/L (ref -9.6–2)
BASE EXCESS BLDA CALC-SCNC: -2.4 MMOL/L (ref -9.6–2)
BASE EXCESS BLDA CALC-SCNC: -2.4 MMOL/L (ref -9.6–2)
BASE EXCESS BLDA CALC-SCNC: -3.9 MMOL/L (ref -9.6–2)
BASE EXCESS BLDA CALC-SCNC: -4.8 MMOL/L (ref -9.6–2)
BASE EXCESS BLDA CALC-SCNC: -5 MMOL/L (ref -9–1.8)
BASE EXCESS BLDA CALC-SCNC: -7.2 MMOL/L (ref -9–1.8)
BASE EXCESS BLDA CALC-SCNC: -8.1 MMOL/L (ref -9–1.8)
BASE EXCESS BLDA CALC-SCNC: 0.4 MMOL/L (ref -9–1.8)
BASE EXCESS BLDV CALC-SCNC: 0.2 MMOL/L (ref -8.1–1.9)
BILIRUB SERPL-MCNC: 0.6 MG/DL
BLD PROD TYP BPU: NORMAL
BLOOD COMPONENT TYPE: NORMAL
BUN SERPL-MCNC: 13.5 MG/DL (ref 8–23)
CA-I BLD-MCNC: 3.7 MG/DL (ref 4.4–5.2)
CA-I BLD-MCNC: 3.7 MG/DL (ref 4.4–5.2)
CA-I BLD-MCNC: 3.8 MG/DL (ref 4.4–5.2)
CA-I BLD-MCNC: 3.9 MG/DL (ref 4.4–5.2)
CA-I BLD-MCNC: 3.9 MG/DL (ref 4.4–5.2)
CA-I BLD-MCNC: 4 MG/DL (ref 4.4–5.2)
CA-I BLD-MCNC: 4.1 MG/DL (ref 4.4–5.2)
CA-I BLD-MCNC: 4.4 MG/DL (ref 4.4–5.2)
CA-I BLD-MCNC: 4.6 MG/DL (ref 4.4–5.2)
CA-I BLD-MCNC: 4.7 MG/DL (ref 4.4–5.2)
CA-I BLD-MCNC: 4.9 MG/DL (ref 4.4–5.2)
CALCIUM SERPL-MCNC: 8.2 MG/DL (ref 8.8–10.2)
CHLORIDE SERPL-SCNC: 105 MMOL/L (ref 98–107)
CLOT INIT KAOL IND TO POST HEP NEUT TRTO: 1 {RATIO}
CLOT INIT KAOL IND TO POST HEP NEUT TRTO: 1.1 {RATIO}
CLOT INIT KAOL IND TO POST HEP NEUT TRTO: 1.2 {RATIO}
CLOT INIT KAOLIN IND BLD US: 143 SEC (ref 113–166)
CLOT INIT KAOLIN IND BLD US: 146 SEC (ref 113–166)
CLOT INIT KAOLIN IND BLD US: 151 SEC (ref 113–166)
CLOT INIT KAOLIN IND P HEP NEUT BLD US: 124 SEC (ref 103–153)
CLOT INIT KAOLIN IND P HEP NEUT BLD US: 136 SEC (ref 103–153)
CLOT INIT KAOLIN IND P HEP NEUT BLD US: 147 SEC (ref 103–153)
CLOT STIFF PLT CONT BLD CALC: 10.6 HPA (ref 11.9–29.8)
CLOT STIFF PLT CONT BLD CALC: 13.4 HPA (ref 11.9–29.8)
CLOT STIFF PLT CONT BLD CALC: 15.5 HPA (ref 11.9–29.8)
CLOT STIFF TF IND P HEP NEUT BLD US: 12.1 HPA (ref 13–33.2)
CLOT STIFF TF IND P HEP NEUT BLD US: 15.2 HPA (ref 13–33.2)
CLOT STIFF TF IND P HEP NEUT BLD US: 17.5 HPA (ref 13–33.2)
CLOT STIFF TF IND+IIB-IIIA INH P HEP NEU: 1.5 HPA (ref 1–3.7)
CLOT STIFF TF IND+IIB-IIIA INH P HEP NEU: 1.8 HPA (ref 1–3.7)
CLOT STIFF TF IND+IIB-IIIA INH P HEP NEU: 2 HPA (ref 1–3.7)
CODING SYSTEM: NORMAL
CREAT SERPL-MCNC: 0.88 MG/DL (ref 0.67–1.17)
CROSSMATCH: NORMAL
CROSSMATCH: NORMAL
DEPRECATED HCO3 PLAS-SCNC: 24 MMOL/L (ref 22–29)
EGFRCR SERPLBLD CKD-EPI 2021: >90 ML/MIN/1.73M2
ERYTHROCYTE [DISTWIDTH] IN BLOOD BY AUTOMATED COUNT: 12.9 % (ref 10–15)
FIBRINOGEN PPP-MCNC: 196 MG/DL (ref 170–490)
GLUCOSE BLD-MCNC: 120 MG/DL (ref 70–99)
GLUCOSE BLD-MCNC: 128 MG/DL (ref 70–99)
GLUCOSE BLD-MCNC: 140 MG/DL (ref 70–99)
GLUCOSE BLD-MCNC: 140 MG/DL (ref 70–99)
GLUCOSE BLD-MCNC: 159 MG/DL (ref 70–99)
GLUCOSE BLD-MCNC: 171 MG/DL (ref 70–99)
GLUCOSE BLD-MCNC: 175 MG/DL (ref 70–99)
GLUCOSE BLD-MCNC: 178 MG/DL (ref 70–99)
GLUCOSE BLD-MCNC: 179 MG/DL (ref 70–99)
GLUCOSE BLD-MCNC: 184 MG/DL (ref 70–99)
GLUCOSE BLDC GLUCOMTR-MCNC: 116 MG/DL (ref 70–99)
GLUCOSE BLDC GLUCOMTR-MCNC: 117 MG/DL (ref 70–99)
GLUCOSE BLDC GLUCOMTR-MCNC: 121 MG/DL (ref 70–99)
GLUCOSE BLDC GLUCOMTR-MCNC: 123 MG/DL (ref 70–99)
GLUCOSE BLDC GLUCOMTR-MCNC: 125 MG/DL (ref 70–99)
GLUCOSE BLDC GLUCOMTR-MCNC: 131 MG/DL (ref 70–99)
GLUCOSE SERPL-MCNC: 132 MG/DL (ref 70–99)
HCO3 BLD-SCNC: 17 MMOL/L (ref 21–28)
HCO3 BLD-SCNC: 18 MMOL/L (ref 21–28)
HCO3 BLD-SCNC: 19 MMOL/L (ref 21–28)
HCO3 BLD-SCNC: 25 MMOL/L (ref 21–28)
HCO3 BLDA-SCNC: 20 MMOL/L (ref 21–28)
HCO3 BLDA-SCNC: 24 MMOL/L (ref 21–28)
HCO3 BLDA-SCNC: 25 MMOL/L (ref 21–28)
HCO3 BLDA-SCNC: 26 MMOL/L (ref 21–28)
HCO3 BLDV-SCNC: 28 MMOL/L (ref 21–28)
HCT VFR BLD AUTO: 33.2 % (ref 40–53)
HGB BLD-MCNC: 10 G/DL (ref 13.3–17.7)
HGB BLD-MCNC: 10 G/DL (ref 13.3–17.7)
HGB BLD-MCNC: 10.3 G/DL (ref 13.3–17.7)
HGB BLD-MCNC: 10.9 G/DL (ref 13.3–17.7)
HGB BLD-MCNC: 12.4 G/DL (ref 13.3–17.7)
HGB BLD-MCNC: 13.5 G/DL (ref 13.3–17.7)
HGB BLD-MCNC: 8.9 G/DL (ref 13.3–17.7)
HGB BLD-MCNC: 9.5 G/DL (ref 13.3–17.7)
HGB BLD-MCNC: 9.6 G/DL (ref 13.3–17.7)
HGB BLD-MCNC: 9.6 G/DL (ref 13.3–17.7)
HGB BLD-MCNC: 9.8 G/DL (ref 13.3–17.7)
INR PPP: 1.44 (ref 0.85–1.15)
INR PPP: 1.68 (ref 0.85–1.15)
ISSUE DATE AND TIME: NORMAL
LACTATE BLD-SCNC: 1.1 MMOL/L
LACTATE BLD-SCNC: 1.2 MMOL/L
LACTATE BLD-SCNC: 1.2 MMOL/L
LACTATE BLD-SCNC: 1.3 MMOL/L
LACTATE BLD-SCNC: 1.5 MMOL/L
LACTATE BLD-SCNC: 1.7 MMOL/L
LACTATE BLD-SCNC: 1.7 MMOL/L
LACTATE BLD-SCNC: 2 MMOL/L
LACTATE BLD-SCNC: 2.4 MMOL/L
LACTATE BLD-SCNC: 2.9 MMOL/L
LACTATE SERPL-SCNC: 1.9 MMOL/L (ref 0.7–2)
LACTATE SERPL-SCNC: 2.5 MMOL/L (ref 0.7–2)
LACTATE SERPL-SCNC: 3 MMOL/L (ref 0.7–2)
LACTATE SERPL-SCNC: 3.7 MMOL/L (ref 0.7–2)
MAGNESIUM SERPL-MCNC: 2.5 MG/DL (ref 1.7–2.3)
MCH RBC QN AUTO: 30.5 PG (ref 26.5–33)
MCHC RBC AUTO-ENTMCNC: 32.8 G/DL (ref 31.5–36.5)
MCV RBC AUTO: 93 FL (ref 78–100)
O2/TOTAL GAS SETTING VFR VENT: 0 %
O2/TOTAL GAS SETTING VFR VENT: 100 %
O2/TOTAL GAS SETTING VFR VENT: 100 %
O2/TOTAL GAS SETTING VFR VENT: 21 %
O2/TOTAL GAS SETTING VFR VENT: 40 %
O2/TOTAL GAS SETTING VFR VENT: 66 %
O2/TOTAL GAS SETTING VFR VENT: 70 %
O2/TOTAL GAS SETTING VFR VENT: 70 %
O2/TOTAL GAS SETTING VFR VENT: 80 %
OXYHGB MFR BLD: 95 % (ref 92–100)
OXYHGB MFR BLD: 96 % (ref 92–100)
OXYHGB MFR BLD: 97 % (ref 92–100)
OXYHGB MFR BLD: 98 % (ref 92–100)
OXYHGB MFR BLDA: 99 % (ref 92–100)
PCO2 BLD: 32 MM HG (ref 35–45)
PCO2 BLD: 33 MM HG (ref 35–45)
PCO2 BLD: 34 MM HG (ref 35–45)
PCO2 BLD: 40 MM HG (ref 35–45)
PCO2 BLDA: 36 MM HG (ref 35–45)
PCO2 BLDA: 44 MM HG (ref 35–45)
PCO2 BLDA: 45 MM HG (ref 35–45)
PCO2 BLDA: 46 MM HG (ref 35–45)
PCO2 BLDA: 51 MM HG (ref 35–45)
PCO2 BLDA: 51 MM HG (ref 35–45)
PCO2 BLDA: 52 MM HG (ref 35–45)
PCO2 BLDA: 54 MM HG (ref 35–45)
PCO2 BLDA: 59 MM HG (ref 35–45)
PCO2 BLDV: 59 MM HG (ref 40–50)
PH BLD: 7.31 [PH] (ref 7.35–7.45)
PH BLD: 7.34 [PH] (ref 7.35–7.45)
PH BLD: 7.39 [PH] (ref 7.35–7.45)
PH BLD: 7.41 [PH] (ref 7.35–7.45)
PH BLDA: 7.23 [PH] (ref 7.35–7.45)
PH BLDA: 7.29 [PH] (ref 7.35–7.45)
PH BLDA: 7.29 [PH] (ref 7.35–7.45)
PH BLDA: 7.3 [PH] (ref 7.35–7.45)
PH BLDA: 7.3 [PH] (ref 7.35–7.45)
PH BLDA: 7.34 [PH] (ref 7.35–7.45)
PH BLDA: 7.34 [PH] (ref 7.35–7.45)
PH BLDA: 7.36 [PH] (ref 7.35–7.45)
PH BLDA: 7.36 [PH] (ref 7.35–7.45)
PH BLDV: 7.28 [PH] (ref 7.32–7.43)
PHOSPHATE SERPL-MCNC: 4.3 MG/DL (ref 2.5–4.5)
PLATELET # BLD AUTO: 134 10E3/UL (ref 150–450)
PLATELET # BLD AUTO: 140 10E3/UL (ref 150–450)
PO2 BLD: 100 MM HG (ref 80–105)
PO2 BLD: 127 MM HG (ref 80–105)
PO2 BLD: 79 MM HG (ref 80–105)
PO2 BLD: 90 MM HG (ref 80–105)
PO2 BLDA: 167 MM HG (ref 80–105)
PO2 BLDA: 304 MM HG (ref 80–105)
PO2 BLDA: 307 MM HG (ref 80–105)
PO2 BLDA: 341 MM HG (ref 80–105)
PO2 BLDA: 344 MM HG (ref 80–105)
PO2 BLDA: 375 MM HG (ref 80–105)
PO2 BLDA: 396 MM HG (ref 80–105)
PO2 BLDA: 401 MM HG (ref 80–105)
PO2 BLDA: 99 MM HG (ref 80–105)
PO2 BLDV: 47 MM HG (ref 25–47)
POTASSIUM BLD-SCNC: 3.6 MMOL/L (ref 3.5–5)
POTASSIUM BLD-SCNC: 4.4 MMOL/L (ref 3.5–5)
POTASSIUM BLD-SCNC: 4.6 MMOL/L (ref 3.5–5)
POTASSIUM BLD-SCNC: 5.4 MMOL/L (ref 3.5–5)
POTASSIUM BLD-SCNC: 5.5 MMOL/L (ref 3.5–5)
POTASSIUM BLD-SCNC: 5.5 MMOL/L (ref 3.5–5)
POTASSIUM BLD-SCNC: 5.6 MMOL/L (ref 3.5–5)
POTASSIUM BLD-SCNC: 5.6 MMOL/L (ref 3.5–5)
POTASSIUM BLD-SCNC: 5.7 MMOL/L (ref 3.5–5)
POTASSIUM BLD-SCNC: 6 MMOL/L (ref 3.5–5)
POTASSIUM SERPL-SCNC: 4.7 MMOL/L (ref 3.4–5.3)
PROT SERPL-MCNC: 4.5 G/DL (ref 6.4–8.3)
RBC # BLD AUTO: 3.57 10E6/UL (ref 4.4–5.9)
SODIUM BLD-SCNC: 136 MMOL/L (ref 135–145)
SODIUM BLD-SCNC: 137 MMOL/L (ref 135–145)
SODIUM BLD-SCNC: 137 MMOL/L (ref 135–145)
SODIUM BLD-SCNC: 139 MMOL/L (ref 135–145)
SODIUM BLD-SCNC: 140 MMOL/L (ref 135–145)
SODIUM BLD-SCNC: 140 MMOL/L (ref 135–145)
SODIUM SERPL-SCNC: 141 MMOL/L (ref 135–145)
SPECIMEN EXPIRATION DATE: NORMAL
UNIT ABO/RH: NORMAL
UNIT NUMBER: NORMAL
UNIT STATUS: NORMAL
UNIT TYPE ISBT: 7300
WBC # BLD AUTO: 20.1 10E3/UL (ref 4–11)

## 2023-11-16 PROCEDURE — 83605 ASSAY OF LACTIC ACID: CPT | Performed by: STUDENT IN AN ORGANIZED HEALTH CARE EDUCATION/TRAINING PROGRAM

## 2023-11-16 PROCEDURE — 06BQ4ZZ EXCISION OF LEFT SAPHENOUS VEIN, PERCUTANEOUS ENDOSCOPIC APPROACH: ICD-10-PCS | Performed by: STUDENT IN AN ORGANIZED HEALTH CARE EDUCATION/TRAINING PROGRAM

## 2023-11-16 PROCEDURE — 258N000003 HC RX IP 258 OP 636: Performed by: STUDENT IN AN ORGANIZED HEALTH CARE EDUCATION/TRAINING PROGRAM

## 2023-11-16 PROCEDURE — 82810 BLOOD GASES O2 SAT ONLY: CPT

## 2023-11-16 PROCEDURE — 82330 ASSAY OF CALCIUM: CPT

## 2023-11-16 PROCEDURE — 200N000002 HC R&B ICU UMMC

## 2023-11-16 PROCEDURE — 86901 BLOOD TYPING SEROLOGIC RH(D): CPT | Performed by: STUDENT IN AN ORGANIZED HEALTH CARE EDUCATION/TRAINING PROGRAM

## 2023-11-16 PROCEDURE — 85730 THROMBOPLASTIN TIME PARTIAL: CPT | Performed by: PHYSICIAN ASSISTANT

## 2023-11-16 PROCEDURE — 250N000025 HC SEVOFLURANE, PER MIN: Performed by: STUDENT IN AN ORGANIZED HEALTH CARE EDUCATION/TRAINING PROGRAM

## 2023-11-16 PROCEDURE — 250N000009 HC RX 250: Performed by: STUDENT IN AN ORGANIZED HEALTH CARE EDUCATION/TRAINING PROGRAM

## 2023-11-16 PROCEDURE — 82805 BLOOD GASES W/O2 SATURATION: CPT | Performed by: STUDENT IN AN ORGANIZED HEALTH CARE EDUCATION/TRAINING PROGRAM

## 2023-11-16 PROCEDURE — 33533 CABG ARTERIAL SINGLE: CPT | Performed by: STUDENT IN AN ORGANIZED HEALTH CARE EDUCATION/TRAINING PROGRAM

## 2023-11-16 PROCEDURE — 410N000004: Performed by: STUDENT IN AN ORGANIZED HEALTH CARE EDUCATION/TRAINING PROGRAM

## 2023-11-16 PROCEDURE — 250N000009 HC RX 250: Performed by: PHYSICIAN ASSISTANT

## 2023-11-16 PROCEDURE — 250N000013 HC RX MED GY IP 250 OP 250 PS 637: Performed by: NURSE PRACTITIONER

## 2023-11-16 PROCEDURE — 999N000157 HC STATISTIC RCP TIME EA 10 MIN

## 2023-11-16 PROCEDURE — 999N000065 XR CHEST PORT 1 VIEW

## 2023-11-16 PROCEDURE — 250N000011 HC RX IP 250 OP 636: Performed by: STUDENT IN AN ORGANIZED HEALTH CARE EDUCATION/TRAINING PROGRAM

## 2023-11-16 PROCEDURE — 85384 FIBRINOGEN ACTIVITY: CPT | Performed by: STUDENT IN AN ORGANIZED HEALTH CARE EDUCATION/TRAINING PROGRAM

## 2023-11-16 PROCEDURE — 258N000003 HC RX IP 258 OP 636

## 2023-11-16 PROCEDURE — 250N000012 HC RX MED GY IP 250 OP 636 PS 637: Performed by: STUDENT IN AN ORGANIZED HEALTH CARE EDUCATION/TRAINING PROGRAM

## 2023-11-16 PROCEDURE — 85730 THROMBOPLASTIN TIME PARTIAL: CPT | Performed by: STUDENT IN AN ORGANIZED HEALTH CARE EDUCATION/TRAINING PROGRAM

## 2023-11-16 PROCEDURE — 85396 CLOTTING ASSAY WHOLE BLOOD: CPT

## 2023-11-16 PROCEDURE — 83605 ASSAY OF LACTIC ACID: CPT | Performed by: PHYSICIAN ASSISTANT

## 2023-11-16 PROCEDURE — 82803 BLOOD GASES ANY COMBINATION: CPT

## 2023-11-16 PROCEDURE — 021009W BYPASS CORONARY ARTERY, ONE ARTERY FROM AORTA WITH AUTOLOGOUS VENOUS TISSUE, OPEN APPROACH: ICD-10-PCS | Performed by: STUDENT IN AN ORGANIZED HEALTH CARE EDUCATION/TRAINING PROGRAM

## 2023-11-16 PROCEDURE — C1898 LEAD, PMKR, OTHER THAN TRANS: HCPCS | Performed by: STUDENT IN AN ORGANIZED HEALTH CARE EDUCATION/TRAINING PROGRAM

## 2023-11-16 PROCEDURE — 76998 US GUIDE INTRAOP: CPT | Mod: 26 | Performed by: STUDENT IN AN ORGANIZED HEALTH CARE EDUCATION/TRAINING PROGRAM

## 2023-11-16 PROCEDURE — 33517 CABG ARTERY-VEIN SINGLE: CPT | Performed by: STUDENT IN AN ORGANIZED HEALTH CARE EDUCATION/TRAINING PROGRAM

## 2023-11-16 PROCEDURE — 85610 PROTHROMBIN TIME: CPT | Performed by: STUDENT IN AN ORGANIZED HEALTH CARE EDUCATION/TRAINING PROGRAM

## 2023-11-16 PROCEDURE — 999N000141 HC STATISTIC PRE-PROCEDURE NURSING ASSESSMENT: Performed by: STUDENT IN AN ORGANIZED HEALTH CARE EDUCATION/TRAINING PROGRAM

## 2023-11-16 PROCEDURE — 84132 ASSAY OF SERUM POTASSIUM: CPT | Performed by: PHYSICIAN ASSISTANT

## 2023-11-16 PROCEDURE — 02100Z9 BYPASS CORONARY ARTERY, ONE ARTERY FROM LEFT INTERNAL MAMMARY, OPEN APPROACH: ICD-10-PCS | Performed by: STUDENT IN AN ORGANIZED HEALTH CARE EDUCATION/TRAINING PROGRAM

## 2023-11-16 PROCEDURE — 36415 COLL VENOUS BLD VENIPUNCTURE: CPT | Performed by: STUDENT IN AN ORGANIZED HEALTH CARE EDUCATION/TRAINING PROGRAM

## 2023-11-16 PROCEDURE — 93005 ELECTROCARDIOGRAM TRACING: CPT

## 2023-11-16 PROCEDURE — 258N000003 HC RX IP 258 OP 636: Performed by: PHYSICIAN ASSISTANT

## 2023-11-16 PROCEDURE — 85049 AUTOMATED PLATELET COUNT: CPT | Performed by: STUDENT IN AN ORGANIZED HEALTH CARE EDUCATION/TRAINING PROGRAM

## 2023-11-16 PROCEDURE — 83605 ASSAY OF LACTIC ACID: CPT

## 2023-11-16 PROCEDURE — 250N000009 HC RX 250: Performed by: NURSE ANESTHETIST, CERTIFIED REGISTERED

## 2023-11-16 PROCEDURE — 250N000011 HC RX IP 250 OP 636: Mod: JZ | Performed by: PHYSICIAN ASSISTANT

## 2023-11-16 PROCEDURE — 250N000013 HC RX MED GY IP 250 OP 250 PS 637: Performed by: PHYSICIAN ASSISTANT

## 2023-11-16 PROCEDURE — 82805 BLOOD GASES W/O2 SATURATION: CPT | Performed by: PHYSICIAN ASSISTANT

## 2023-11-16 PROCEDURE — 250N000011 HC RX IP 250 OP 636: Performed by: NURSE ANESTHETIST, CERTIFIED REGISTERED

## 2023-11-16 PROCEDURE — 5A1221Z PERFORMANCE OF CARDIAC OUTPUT, CONTINUOUS: ICD-10-PCS | Performed by: STUDENT IN AN ORGANIZED HEALTH CARE EDUCATION/TRAINING PROGRAM

## 2023-11-16 PROCEDURE — 250N000024 HC ISOFLURANE, PER MIN: Performed by: STUDENT IN AN ORGANIZED HEALTH CARE EDUCATION/TRAINING PROGRAM

## 2023-11-16 PROCEDURE — 83735 ASSAY OF MAGNESIUM: CPT | Performed by: PHYSICIAN ASSISTANT

## 2023-11-16 PROCEDURE — 85027 COMPLETE CBC AUTOMATED: CPT | Performed by: STUDENT IN AN ORGANIZED HEALTH CARE EDUCATION/TRAINING PROGRAM

## 2023-11-16 PROCEDURE — 272N000085 HC PACK CELL SAVER CSP: Performed by: STUDENT IN AN ORGANIZED HEALTH CARE EDUCATION/TRAINING PROGRAM

## 2023-11-16 PROCEDURE — 272N000001 HC OR GENERAL SUPPLY STERILE: Performed by: STUDENT IN AN ORGANIZED HEALTH CARE EDUCATION/TRAINING PROGRAM

## 2023-11-16 PROCEDURE — 360N000079 HC SURGERY LEVEL 6, PER MIN: Performed by: STUDENT IN AN ORGANIZED HEALTH CARE EDUCATION/TRAINING PROGRAM

## 2023-11-16 PROCEDURE — 250N000013 HC RX MED GY IP 250 OP 250 PS 637: Performed by: STUDENT IN AN ORGANIZED HEALTH CARE EDUCATION/TRAINING PROGRAM

## 2023-11-16 PROCEDURE — 85049 AUTOMATED PLATELET COUNT: CPT | Performed by: PHYSICIAN ASSISTANT

## 2023-11-16 PROCEDURE — 84132 ASSAY OF SERUM POTASSIUM: CPT

## 2023-11-16 PROCEDURE — 82330 ASSAY OF CALCIUM: CPT | Performed by: PHYSICIAN ASSISTANT

## 2023-11-16 PROCEDURE — P9037 PLATE PHERES LEUKOREDU IRRAD: HCPCS | Performed by: STUDENT IN AN ORGANIZED HEALTH CARE EDUCATION/TRAINING PROGRAM

## 2023-11-16 PROCEDURE — 85610 PROTHROMBIN TIME: CPT | Performed by: PHYSICIAN ASSISTANT

## 2023-11-16 PROCEDURE — 250N000011 HC RX IP 250 OP 636

## 2023-11-16 PROCEDURE — 410N000003 HC PER-PERFUSION 1ST 30 MIN: Performed by: STUDENT IN AN ORGANIZED HEALTH CARE EDUCATION/TRAINING PROGRAM

## 2023-11-16 PROCEDURE — 370N000017 HC ANESTHESIA TECHNICAL FEE, PER MIN: Performed by: STUDENT IN AN ORGANIZED HEALTH CARE EDUCATION/TRAINING PROGRAM

## 2023-11-16 PROCEDURE — 258N000003 HC RX IP 258 OP 636: Performed by: NURSE ANESTHETIST, CERTIFIED REGISTERED

## 2023-11-16 PROCEDURE — 272N000002 HC OR SUPPLY OTHER OPNP: Performed by: STUDENT IN AN ORGANIZED HEALTH CARE EDUCATION/TRAINING PROGRAM

## 2023-11-16 PROCEDURE — 272N000088 HC PUMP APP ADULT PERFUSION: Performed by: STUDENT IN AN ORGANIZED HEALTH CARE EDUCATION/TRAINING PROGRAM

## 2023-11-16 PROCEDURE — 250N000011 HC RX IP 250 OP 636: Mod: JZ | Performed by: ANESTHESIOLOGY

## 2023-11-16 PROCEDURE — 86850 RBC ANTIBODY SCREEN: CPT | Performed by: STUDENT IN AN ORGANIZED HEALTH CARE EDUCATION/TRAINING PROGRAM

## 2023-11-16 PROCEDURE — 250N000009 HC RX 250

## 2023-11-16 PROCEDURE — 271N000002 HC RX 271: Performed by: ANESTHESIOLOGY

## 2023-11-16 PROCEDURE — 86923 COMPATIBILITY TEST ELECTRIC: CPT | Performed by: STUDENT IN AN ORGANIZED HEALTH CARE EDUCATION/TRAINING PROGRAM

## 2023-11-16 PROCEDURE — 84100 ASSAY OF PHOSPHORUS: CPT | Performed by: PHYSICIAN ASSISTANT

## 2023-11-16 PROCEDURE — P9047 ALBUMIN (HUMAN), 25%, 50ML: HCPCS

## 2023-11-16 PROCEDURE — 71045 X-RAY EXAM CHEST 1 VIEW: CPT | Mod: 26 | Performed by: STUDENT IN AN ORGANIZED HEALTH CARE EDUCATION/TRAINING PROGRAM

## 2023-11-16 PROCEDURE — 94002 VENT MGMT INPAT INIT DAY: CPT

## 2023-11-16 RX ORDER — LIDOCAINE 4 G/G
1-2 PATCH TOPICAL EVERY 24 HOURS
Status: DISCONTINUED | OUTPATIENT
Start: 2023-11-16 | End: 2023-11-17

## 2023-11-16 RX ORDER — NICOTINE POLACRILEX 4 MG
15-30 LOZENGE BUCCAL
Status: DISCONTINUED | OUTPATIENT
Start: 2023-11-16 | End: 2023-11-21 | Stop reason: HOSPADM

## 2023-11-16 RX ORDER — TAMSULOSIN HYDROCHLORIDE 0.4 MG/1
0.4 CAPSULE ORAL DAILY
Status: DISCONTINUED | OUTPATIENT
Start: 2023-11-16 | End: 2023-11-21 | Stop reason: HOSPADM

## 2023-11-16 RX ORDER — CHLORHEXIDINE GLUCONATE ORAL RINSE 1.2 MG/ML
10 SOLUTION DENTAL ONCE
Status: COMPLETED | OUTPATIENT
Start: 2023-11-16 | End: 2023-11-16

## 2023-11-16 RX ORDER — HEPARIN SODIUM 1000 [USP'U]/ML
INJECTION, SOLUTION INTRAVENOUS; SUBCUTANEOUS PRN
Status: DISCONTINUED | OUTPATIENT
Start: 2023-11-16 | End: 2023-11-16

## 2023-11-16 RX ORDER — FOLIC ACID 1 MG/1
1 TABLET ORAL DAILY
Status: COMPLETED | OUTPATIENT
Start: 2023-11-16 | End: 2023-11-17

## 2023-11-16 RX ORDER — NALOXONE HYDROCHLORIDE 0.4 MG/ML
0.2 INJECTION, SOLUTION INTRAMUSCULAR; INTRAVENOUS; SUBCUTANEOUS
Status: DISCONTINUED | OUTPATIENT
Start: 2023-11-16 | End: 2023-11-21 | Stop reason: HOSPADM

## 2023-11-16 RX ORDER — FAMOTIDINE 20 MG/1
20 TABLET, FILM COATED ORAL
Status: COMPLETED | OUTPATIENT
Start: 2023-11-16 | End: 2023-11-16

## 2023-11-16 RX ORDER — CALCIUM CARBONATE 500 MG/1
500 TABLET, CHEWABLE ORAL 4 TIMES DAILY PRN
Status: DISCONTINUED | OUTPATIENT
Start: 2023-11-16 | End: 2023-11-21 | Stop reason: HOSPADM

## 2023-11-16 RX ORDER — SODIUM CHLORIDE, SODIUM GLUCONATE, SODIUM ACETATE, POTASSIUM CHLORIDE AND MAGNESIUM CHLORIDE 526; 502; 368; 37; 30 MG/100ML; MG/100ML; MG/100ML; MG/100ML; MG/100ML
INJECTION, SOLUTION INTRAVENOUS CONTINUOUS PRN
Status: DISCONTINUED | OUTPATIENT
Start: 2023-11-16 | End: 2023-11-16

## 2023-11-16 RX ORDER — FLUMAZENIL 0.1 MG/ML
0.2 INJECTION, SOLUTION INTRAVENOUS
Status: DISCONTINUED | OUTPATIENT
Start: 2023-11-16 | End: 2023-11-16 | Stop reason: HOSPADM

## 2023-11-16 RX ORDER — HYDROXYZINE HYDROCHLORIDE 10 MG/1
10 TABLET, FILM COATED ORAL EVERY 6 HOURS PRN
Status: DISCONTINUED | OUTPATIENT
Start: 2023-11-16 | End: 2023-11-21 | Stop reason: HOSPADM

## 2023-11-16 RX ORDER — FIBRINOGEN (HUMAN) 700-1300MG
1050 KIT INTRAVENOUS
Status: COMPLETED | OUTPATIENT
Start: 2023-11-16 | End: 2023-11-16

## 2023-11-16 RX ORDER — ROSUVASTATIN CALCIUM 40 MG/1
40 TABLET, COATED ORAL DAILY
Status: DISCONTINUED | OUTPATIENT
Start: 2023-11-16 | End: 2023-11-21 | Stop reason: HOSPADM

## 2023-11-16 RX ORDER — SODIUM CHLORIDE, SODIUM LACTATE, POTASSIUM CHLORIDE, CALCIUM CHLORIDE 600; 310; 30; 20 MG/100ML; MG/100ML; MG/100ML; MG/100ML
INJECTION, SOLUTION INTRAVENOUS CONTINUOUS
Status: DISCONTINUED | OUTPATIENT
Start: 2023-11-16 | End: 2023-11-16 | Stop reason: HOSPADM

## 2023-11-16 RX ORDER — CLINDAMYCIN PHOSPHATE 900 MG/50ML
900 INJECTION, SOLUTION INTRAVENOUS SEE ADMIN INSTRUCTIONS
Status: DISCONTINUED | OUTPATIENT
Start: 2023-11-16 | End: 2023-11-16 | Stop reason: HOSPADM

## 2023-11-16 RX ORDER — SODIUM CHLORIDE, SODIUM LACTATE, POTASSIUM CHLORIDE, CALCIUM CHLORIDE 600; 310; 30; 20 MG/100ML; MG/100ML; MG/100ML; MG/100ML
INJECTION, SOLUTION INTRAVENOUS CONTINUOUS
Status: DISCONTINUED | OUTPATIENT
Start: 2023-11-16 | End: 2023-11-17

## 2023-11-16 RX ORDER — PANTOPRAZOLE SODIUM 40 MG/1
40 TABLET, DELAYED RELEASE ORAL DAILY
Status: DISCONTINUED | OUTPATIENT
Start: 2023-11-16 | End: 2023-11-21 | Stop reason: HOSPADM

## 2023-11-16 RX ORDER — PROTAMINE SULFATE 10 MG/ML
INJECTION, SOLUTION INTRAVENOUS PRN
Status: DISCONTINUED | OUTPATIENT
Start: 2023-11-16 | End: 2023-11-16

## 2023-11-16 RX ORDER — PHENYLEPHRINE HCL IN 0.9% NACL 50MG/250ML
.1-6 PLASTIC BAG, INJECTION (ML) INTRAVENOUS CONTINUOUS
Status: DISCONTINUED | OUTPATIENT
Start: 2023-11-16 | End: 2023-11-16 | Stop reason: HOSPADM

## 2023-11-16 RX ORDER — NITROGLYCERIN 20 MG/100ML
10-200 INJECTION INTRAVENOUS CONTINUOUS PRN
Status: DISCONTINUED | OUTPATIENT
Start: 2023-11-16 | End: 2023-11-17

## 2023-11-16 RX ORDER — PROPOFOL 10 MG/ML
INJECTION, EMULSION INTRAVENOUS PRN
Status: DISCONTINUED | OUTPATIENT
Start: 2023-11-16 | End: 2023-11-16

## 2023-11-16 RX ORDER — ACETAMINOPHEN 325 MG/1
650 TABLET ORAL EVERY 4 HOURS PRN
Status: DISCONTINUED | OUTPATIENT
Start: 2023-11-19 | End: 2023-11-21 | Stop reason: HOSPADM

## 2023-11-16 RX ORDER — ACETAMINOPHEN 325 MG/1
975 TABLET ORAL EVERY 8 HOURS
Status: DISCONTINUED | OUTPATIENT
Start: 2023-11-16 | End: 2023-11-17

## 2023-11-16 RX ORDER — VASOPRESSIN IN 0.9 % NACL 2 UNIT/2ML
SYRINGE (ML) INTRAVENOUS PRN
Status: DISCONTINUED | OUTPATIENT
Start: 2023-11-16 | End: 2023-11-16

## 2023-11-16 RX ORDER — LIDOCAINE 40 MG/G
CREAM TOPICAL
Status: DISCONTINUED | OUTPATIENT
Start: 2023-11-16 | End: 2023-11-21 | Stop reason: HOSPADM

## 2023-11-16 RX ORDER — OXYCODONE HYDROCHLORIDE 5 MG/1
5 TABLET ORAL EVERY 4 HOURS PRN
Status: DISCONTINUED | OUTPATIENT
Start: 2023-11-16 | End: 2023-11-21 | Stop reason: HOSPADM

## 2023-11-16 RX ORDER — ONDANSETRON 2 MG/ML
4 INJECTION INTRAMUSCULAR; INTRAVENOUS EVERY 6 HOURS PRN
Status: DISCONTINUED | OUTPATIENT
Start: 2023-11-16 | End: 2023-11-21 | Stop reason: HOSPADM

## 2023-11-16 RX ORDER — NALOXONE HYDROCHLORIDE 0.4 MG/ML
0.4 INJECTION, SOLUTION INTRAMUSCULAR; INTRAVENOUS; SUBCUTANEOUS
Status: DISCONTINUED | OUTPATIENT
Start: 2023-11-16 | End: 2023-11-21 | Stop reason: HOSPADM

## 2023-11-16 RX ORDER — LIDOCAINE 40 MG/G
CREAM TOPICAL
Status: DISCONTINUED | OUTPATIENT
Start: 2023-11-16 | End: 2023-11-16 | Stop reason: HOSPADM

## 2023-11-16 RX ORDER — CALCIUM GLUCONATE 20 MG/ML
2 INJECTION, SOLUTION INTRAVENOUS
Status: DISCONTINUED | OUTPATIENT
Start: 2023-11-16 | End: 2023-11-21 | Stop reason: HOSPADM

## 2023-11-16 RX ORDER — NALOXONE HYDROCHLORIDE 0.4 MG/ML
0.2 INJECTION, SOLUTION INTRAMUSCULAR; INTRAVENOUS; SUBCUTANEOUS
Status: DISCONTINUED | OUTPATIENT
Start: 2023-11-16 | End: 2023-11-16

## 2023-11-16 RX ORDER — MULTIPLE VITAMINS W/ MINERALS TAB 9MG-400MCG
1 TAB ORAL DAILY
Status: COMPLETED | OUTPATIENT
Start: 2023-11-16 | End: 2023-11-17

## 2023-11-16 RX ORDER — ACETAMINOPHEN 325 MG/1
975 TABLET ORAL ONCE
Status: COMPLETED | OUTPATIENT
Start: 2023-11-16 | End: 2023-11-16

## 2023-11-16 RX ORDER — POLYETHYLENE GLYCOL 3350 17 G/17G
17 POWDER, FOR SOLUTION ORAL DAILY
Status: DISCONTINUED | OUTPATIENT
Start: 2023-11-17 | End: 2023-11-17

## 2023-11-16 RX ORDER — CALCIUM CHLORIDE 100 MG/ML
INJECTION INTRAVENOUS; INTRAVENTRICULAR PRN
Status: DISCONTINUED | OUTPATIENT
Start: 2023-11-16 | End: 2023-11-16

## 2023-11-16 RX ORDER — ASPIRIN 81 MG/1
162 TABLET, CHEWABLE ORAL
Status: COMPLETED | OUTPATIENT
Start: 2023-11-16 | End: 2023-11-16

## 2023-11-16 RX ORDER — CLINDAMYCIN PHOSPHATE 900 MG/50ML
900 INJECTION, SOLUTION INTRAVENOUS EVERY 8 HOURS
Qty: 150 ML | Refills: 0 | Status: COMPLETED | OUTPATIENT
Start: 2023-11-16 | End: 2023-11-17

## 2023-11-16 RX ORDER — CHLORHEXIDINE GLUCONATE ORAL RINSE 1.2 MG/ML
15 SOLUTION DENTAL EVERY 12 HOURS
Status: DISCONTINUED | OUTPATIENT
Start: 2023-11-16 | End: 2023-11-16

## 2023-11-16 RX ORDER — METHOCARBAMOL 500 MG/1
500 TABLET, FILM COATED ORAL EVERY 6 HOURS PRN
Status: DISCONTINUED | OUTPATIENT
Start: 2023-11-16 | End: 2023-11-17

## 2023-11-16 RX ORDER — VANCOMYCIN HYDROCHLORIDE 1 G/20ML
INJECTION, POWDER, LYOPHILIZED, FOR SOLUTION INTRAVENOUS PRN
Status: DISCONTINUED | OUTPATIENT
Start: 2023-11-16 | End: 2023-11-16 | Stop reason: HOSPADM

## 2023-11-16 RX ORDER — FENTANYL CITRATE 50 UG/ML
25-50 INJECTION, SOLUTION INTRAMUSCULAR; INTRAVENOUS
Status: DISCONTINUED | OUTPATIENT
Start: 2023-11-16 | End: 2023-11-16 | Stop reason: HOSPADM

## 2023-11-16 RX ORDER — HYDROMORPHONE HCL IN WATER/PF 6 MG/30 ML
0.4 PATIENT CONTROLLED ANALGESIA SYRINGE INTRAVENOUS
Status: DISCONTINUED | OUTPATIENT
Start: 2023-11-16 | End: 2023-11-21 | Stop reason: HOSPADM

## 2023-11-16 RX ORDER — ONDANSETRON 4 MG/1
4 TABLET, ORALLY DISINTEGRATING ORAL EVERY 6 HOURS PRN
Status: DISCONTINUED | OUTPATIENT
Start: 2023-11-16 | End: 2023-11-21 | Stop reason: HOSPADM

## 2023-11-16 RX ORDER — DEXMEDETOMIDINE HYDROCHLORIDE 4 UG/ML
.2-.7 INJECTION, SOLUTION INTRAVENOUS CONTINUOUS
Status: DISCONTINUED | OUTPATIENT
Start: 2023-11-16 | End: 2023-11-16

## 2023-11-16 RX ORDER — NALOXONE HYDROCHLORIDE 0.4 MG/ML
0.2 INJECTION, SOLUTION INTRAMUSCULAR; INTRAVENOUS; SUBCUTANEOUS
Status: DISCONTINUED | OUTPATIENT
Start: 2023-11-16 | End: 2023-11-16 | Stop reason: HOSPADM

## 2023-11-16 RX ORDER — SODIUM CHLORIDE, SODIUM LACTATE, POTASSIUM CHLORIDE, CALCIUM CHLORIDE 600; 310; 30; 20 MG/100ML; MG/100ML; MG/100ML; MG/100ML
INJECTION, SOLUTION INTRAVENOUS CONTINUOUS PRN
Status: DISCONTINUED | OUTPATIENT
Start: 2023-11-16 | End: 2023-11-16

## 2023-11-16 RX ORDER — DEXTROSE MONOHYDRATE 25 G/50ML
25-50 INJECTION, SOLUTION INTRAVENOUS
Status: DISCONTINUED | OUTPATIENT
Start: 2023-11-16 | End: 2023-11-21 | Stop reason: HOSPADM

## 2023-11-16 RX ORDER — NALOXONE HYDROCHLORIDE 0.4 MG/ML
0.4 INJECTION, SOLUTION INTRAMUSCULAR; INTRAVENOUS; SUBCUTANEOUS
Status: DISCONTINUED | OUTPATIENT
Start: 2023-11-16 | End: 2023-11-16 | Stop reason: HOSPADM

## 2023-11-16 RX ORDER — DEXMEDETOMIDINE HYDROCHLORIDE 4 UG/ML
.1-1.2 INJECTION, SOLUTION INTRAVENOUS CONTINUOUS
Status: DISCONTINUED | OUTPATIENT
Start: 2023-11-16 | End: 2023-11-16 | Stop reason: HOSPADM

## 2023-11-16 RX ORDER — ASPIRIN 81 MG/1
81 TABLET, CHEWABLE ORAL
Status: COMPLETED | OUTPATIENT
Start: 2023-11-16 | End: 2023-11-16

## 2023-11-16 RX ORDER — AMOXICILLIN 250 MG
1 CAPSULE ORAL 2 TIMES DAILY
Status: DISCONTINUED | OUTPATIENT
Start: 2023-11-16 | End: 2023-11-17

## 2023-11-16 RX ORDER — GABAPENTIN 100 MG/1
100 CAPSULE ORAL
Status: COMPLETED | OUTPATIENT
Start: 2023-11-16 | End: 2023-11-16

## 2023-11-16 RX ORDER — BISACODYL 10 MG
10 SUPPOSITORY, RECTAL RECTAL DAILY PRN
Status: DISCONTINUED | OUTPATIENT
Start: 2023-11-16 | End: 2023-11-21 | Stop reason: HOSPADM

## 2023-11-16 RX ORDER — NALOXONE HYDROCHLORIDE 0.4 MG/ML
0.4 INJECTION, SOLUTION INTRAMUSCULAR; INTRAVENOUS; SUBCUTANEOUS
Status: DISCONTINUED | OUTPATIENT
Start: 2023-11-16 | End: 2023-11-16

## 2023-11-16 RX ORDER — HYDROMORPHONE HCL IN WATER/PF 6 MG/30 ML
0.2 PATIENT CONTROLLED ANALGESIA SYRINGE INTRAVENOUS
Status: DISCONTINUED | OUTPATIENT
Start: 2023-11-16 | End: 2023-11-21 | Stop reason: HOSPADM

## 2023-11-16 RX ORDER — NOREPINEPHRINE BITARTRATE 0.06 MG/ML
.01-.4 INJECTION, SOLUTION INTRAVENOUS CONTINUOUS PRN
Status: DISCONTINUED | OUTPATIENT
Start: 2023-11-16 | End: 2023-11-17

## 2023-11-16 RX ORDER — LIDOCAINE HYDROCHLORIDE 20 MG/ML
INJECTION, SOLUTION INFILTRATION; PERINEURAL PRN
Status: DISCONTINUED | OUTPATIENT
Start: 2023-11-16 | End: 2023-11-16

## 2023-11-16 RX ORDER — HYDRALAZINE HYDROCHLORIDE 20 MG/ML
10 INJECTION INTRAMUSCULAR; INTRAVENOUS EVERY 30 MIN PRN
Status: DISCONTINUED | OUTPATIENT
Start: 2023-11-16 | End: 2023-11-21 | Stop reason: HOSPADM

## 2023-11-16 RX ORDER — HEPARIN SODIUM 5000 [USP'U]/.5ML
5000 INJECTION, SOLUTION INTRAVENOUS; SUBCUTANEOUS EVERY 8 HOURS
Status: DISCONTINUED | OUTPATIENT
Start: 2023-11-17 | End: 2023-11-17

## 2023-11-16 RX ORDER — PROCHLORPERAZINE MALEATE 5 MG
5 TABLET ORAL EVERY 6 HOURS PRN
Status: DISCONTINUED | OUTPATIENT
Start: 2023-11-16 | End: 2023-11-21 | Stop reason: HOSPADM

## 2023-11-16 RX ORDER — CALCIUM GLUCONATE 20 MG/ML
1 INJECTION, SOLUTION INTRAVENOUS
Status: DISCONTINUED | OUTPATIENT
Start: 2023-11-16 | End: 2023-11-21 | Stop reason: HOSPADM

## 2023-11-16 RX ORDER — CLINDAMYCIN PHOSPHATE 900 MG/50ML
900 INJECTION, SOLUTION INTRAVENOUS
Status: DISCONTINUED | OUTPATIENT
Start: 2023-11-16 | End: 2023-11-16 | Stop reason: HOSPADM

## 2023-11-16 RX ORDER — ASPIRIN 81 MG/1
162 TABLET, CHEWABLE ORAL DAILY
Status: DISCONTINUED | OUTPATIENT
Start: 2023-11-16 | End: 2023-11-21 | Stop reason: HOSPADM

## 2023-11-16 RX ORDER — OXYCODONE HYDROCHLORIDE 10 MG/1
10 TABLET ORAL EVERY 4 HOURS PRN
Status: DISCONTINUED | OUTPATIENT
Start: 2023-11-16 | End: 2023-11-21 | Stop reason: HOSPADM

## 2023-11-16 RX ORDER — MAGNESIUM HYDROXIDE/ALUMINUM HYDROXICE/SIMETHICONE 120; 1200; 1200 MG/30ML; MG/30ML; MG/30ML
30 SUSPENSION ORAL EVERY 4 HOURS PRN
Status: DISCONTINUED | OUTPATIENT
Start: 2023-11-16 | End: 2023-11-21 | Stop reason: HOSPADM

## 2023-11-16 RX ADMIN — FENTANYL CITRATE 50 MCG: 50 INJECTION INTRAMUSCULAR; INTRAVENOUS at 07:45

## 2023-11-16 RX ADMIN — SODIUM CHLORIDE, POTASSIUM CHLORIDE, SODIUM LACTATE AND CALCIUM CHLORIDE: 600; 310; 30; 20 INJECTION, SOLUTION INTRAVENOUS at 08:50

## 2023-11-16 RX ADMIN — PROPOFOL 30 MCG/KG/MIN: 10 INJECTION, EMULSION INTRAVENOUS at 13:41

## 2023-11-16 RX ADMIN — PANTOPRAZOLE SODIUM 40 MG: 40 TABLET, DELAYED RELEASE ORAL at 17:23

## 2023-11-16 RX ADMIN — PROTAMINE SULFATE 190 MG: 10 INJECTION, SOLUTION INTRAVENOUS at 12:52

## 2023-11-16 RX ADMIN — VASOPRESSIN 2 UNITS/HR: 20 INJECTION, SOLUTION INTRAMUSCULAR; SUBCUTANEOUS at 10:45

## 2023-11-16 RX ADMIN — Medication 7.5 G: at 08:34

## 2023-11-16 RX ADMIN — SODIUM CHLORIDE, POTASSIUM CHLORIDE, SODIUM LACTATE AND CALCIUM CHLORIDE 250 ML: 600; 310; 30; 20 INJECTION, SOLUTION INTRAVENOUS at 17:49

## 2023-11-16 RX ADMIN — FENTANYL CITRATE 100 MCG: 50 INJECTION INTRAMUSCULAR; INTRAVENOUS at 13:50

## 2023-11-16 RX ADMIN — EPINEPHRINE 0.03 MCG/KG/MIN: 1 INJECTION INTRAMUSCULAR; INTRAVENOUS; SUBCUTANEOUS at 16:29

## 2023-11-16 RX ADMIN — SENNOSIDES AND DOCUSATE SODIUM 1 TABLET: 8.6; 5 TABLET ORAL at 20:36

## 2023-11-16 RX ADMIN — GABAPENTIN 100 MG: 100 CAPSULE ORAL at 07:30

## 2023-11-16 RX ADMIN — SODIUM CHLORIDE, POTASSIUM CHLORIDE, SODIUM LACTATE AND CALCIUM CHLORIDE 250 ML: 600; 310; 30; 20 INJECTION, SOLUTION INTRAVENOUS at 17:03

## 2023-11-16 RX ADMIN — OXYCODONE HYDROCHLORIDE 5 MG: 5 TABLET ORAL at 22:15

## 2023-11-16 RX ADMIN — CLINDAMYCIN PHOSPHATE 900 MG: 900 INJECTION, SOLUTION INTRAVENOUS at 10:58

## 2023-11-16 RX ADMIN — Medication 30 MG: at 13:35

## 2023-11-16 RX ADMIN — NOREPINEPHRINE BITARTRATE 12 MCG: 1 INJECTION, SOLUTION, CONCENTRATE INTRAVENOUS at 10:19

## 2023-11-16 RX ADMIN — EPINEPHRINE 0.03 MCG/KG/MIN: 1 INJECTION INTRAMUSCULAR; INTRAVENOUS; SUBCUTANEOUS at 12:36

## 2023-11-16 RX ADMIN — PROTAMINE SULFATE 40 MG: 10 INJECTION, SOLUTION INTRAVENOUS at 14:07

## 2023-11-16 RX ADMIN — OXYCODONE HYDROCHLORIDE 5 MG: 5 TABLET ORAL at 17:31

## 2023-11-16 RX ADMIN — THIAMINE HCL TAB 100 MG 100 MG: 100 TAB at 20:36

## 2023-11-16 RX ADMIN — SUGAMMADEX 200 MG: 100 INJECTION, SOLUTION INTRAVENOUS at 14:53

## 2023-11-16 RX ADMIN — NOREPINEPHRINE BITARTRATE 12 MCG: 1 INJECTION, SOLUTION, CONCENTRATE INTRAVENOUS at 08:16

## 2023-11-16 RX ADMIN — PHENYLEPHRINE HYDROCHLORIDE 100 MCG: 10 INJECTION INTRAVENOUS at 07:57

## 2023-11-16 RX ADMIN — FIBRINOGEN (HUMAN) 1050 MG: KIT INTRAVENOUS at 13:37

## 2023-11-16 RX ADMIN — ASPIRIN 81 MG CHEWABLE TABLET 162 MG: 81 TABLET CHEWABLE at 17:22

## 2023-11-16 RX ADMIN — NOREPINEPHRINE BITARTRATE 12 MCG: 1 INJECTION, SOLUTION, CONCENTRATE INTRAVENOUS at 09:55

## 2023-11-16 RX ADMIN — ACETAMINOPHEN 975 MG: 325 TABLET, FILM COATED ORAL at 17:23

## 2023-11-16 RX ADMIN — SODIUM CHLORIDE, SODIUM GLUCONATE, SODIUM ACETATE, POTASSIUM CHLORIDE AND MAGNESIUM CHLORIDE: 526; 502; 368; 37; 30 INJECTION, SOLUTION INTRAVENOUS at 10:45

## 2023-11-16 RX ADMIN — Medication 7 ML/HR: at 12:37

## 2023-11-16 RX ADMIN — FENTANYL CITRATE 100 MCG: 50 INJECTION INTRAMUSCULAR; INTRAVENOUS at 13:46

## 2023-11-16 RX ADMIN — FENTANYL CITRATE 100 MCG: 50 INJECTION INTRAMUSCULAR; INTRAVENOUS at 07:53

## 2023-11-16 RX ADMIN — HEPARIN SODIUM 3000 UNITS: 1000 INJECTION INTRAVENOUS; SUBCUTANEOUS at 10:08

## 2023-11-16 RX ADMIN — PHENYLEPHRINE HYDROCHLORIDE 200 MCG: 10 INJECTION INTRAVENOUS at 07:53

## 2023-11-16 RX ADMIN — NOREPINEPHRINE BITARTRATE 16 MCG: 1 INJECTION, SOLUTION, CONCENTRATE INTRAVENOUS at 10:33

## 2023-11-16 RX ADMIN — PHENYLEPHRINE HYDROCHLORIDE 100 MCG: 10 INJECTION INTRAVENOUS at 07:51

## 2023-11-16 RX ADMIN — ACETAMINOPHEN 975 MG: 325 TABLET, FILM COATED ORAL at 22:15

## 2023-11-16 RX ADMIN — Medication 12.5 MG: at 07:32

## 2023-11-16 RX ADMIN — PHENYLEPHRINE HYDROCHLORIDE 100 MCG: 10 INJECTION INTRAVENOUS at 07:55

## 2023-11-16 RX ADMIN — FENTANYL CITRATE 100 MCG: 50 INJECTION INTRAMUSCULAR; INTRAVENOUS at 12:38

## 2023-11-16 RX ADMIN — FENTANYL CITRATE 250 MCG: 50 INJECTION INTRAMUSCULAR; INTRAVENOUS at 12:54

## 2023-11-16 RX ADMIN — CALCIUM CHLORIDE INJECTION 0.5 G: 100 INJECTION, SOLUTION INTRAVENOUS at 13:39

## 2023-11-16 RX ADMIN — HYDROMORPHONE HYDROCHLORIDE 0.2 MG: 0.2 INJECTION, SOLUTION INTRAMUSCULAR; INTRAVENOUS; SUBCUTANEOUS at 15:52

## 2023-11-16 RX ADMIN — Medication 20 MG: at 09:30

## 2023-11-16 RX ADMIN — Medication 30 MG: at 11:36

## 2023-11-16 RX ADMIN — NOREPINEPHRINE BITARTRATE 6 MCG: 1 INJECTION, SOLUTION, CONCENTRATE INTRAVENOUS at 09:01

## 2023-11-16 RX ADMIN — SODIUM CHLORIDE, POTASSIUM CHLORIDE, SODIUM LACTATE AND CALCIUM CHLORIDE 250 ML: 600; 310; 30; 20 INJECTION, SOLUTION INTRAVENOUS at 17:30

## 2023-11-16 RX ADMIN — Medication 0.5 UNITS: at 08:21

## 2023-11-16 RX ADMIN — ASPIRIN 162 MG: 81 TABLET ORAL at 05:56

## 2023-11-16 RX ADMIN — Medication 100 MG: at 07:53

## 2023-11-16 RX ADMIN — SODIUM CHLORIDE, SODIUM GLUCONATE, SODIUM ACETATE, POTASSIUM CHLORIDE AND MAGNESIUM CHLORIDE: 526; 502; 368; 37; 30 INJECTION, SOLUTION INTRAVENOUS at 08:50

## 2023-11-16 RX ADMIN — CLINDAMYCIN PHOSPHATE 900 MG: 900 INJECTION, SOLUTION INTRAVENOUS at 19:50

## 2023-11-16 RX ADMIN — CHLORHEXIDINE GLUCONATE 10 ML: 1.2 SOLUTION ORAL at 05:55

## 2023-11-16 RX ADMIN — LIDOCAINE HYDROCHLORIDE 60 MG: 20 INJECTION, SOLUTION INFILTRATION; PERINEURAL at 07:53

## 2023-11-16 RX ADMIN — AMINOCAPROIC ACID 1.25 G/HR: 250 INJECTION, SOLUTION INTRAVENOUS at 09:34

## 2023-11-16 RX ADMIN — FAMOTIDINE 20 MG: 20 TABLET ORAL at 07:31

## 2023-11-16 RX ADMIN — HYDROMORPHONE HYDROCHLORIDE 0.2 MG: 0.2 INJECTION, SOLUTION INTRAMUSCULAR; INTRAVENOUS; SUBCUTANEOUS at 17:49

## 2023-11-16 RX ADMIN — Medication 1 TABLET: at 20:35

## 2023-11-16 RX ADMIN — PROPOFOL 70 MG: 10 INJECTION, EMULSION INTRAVENOUS at 07:53

## 2023-11-16 RX ADMIN — HEPARIN SODIUM 37000 UNITS: 1000 INJECTION INTRAVENOUS; SUBCUTANEOUS at 09:47

## 2023-11-16 RX ADMIN — NOREPINEPHRINE BITARTRATE 12 MCG: 1 INJECTION, SOLUTION, CONCENTRATE INTRAVENOUS at 08:19

## 2023-11-16 RX ADMIN — FOLIC ACID 1 MG: 1 TABLET ORAL at 20:36

## 2023-11-16 RX ADMIN — ACETAMINOPHEN 975 MG: 325 TABLET, FILM COATED ORAL at 05:56

## 2023-11-16 RX ADMIN — NOREPINEPHRINE BITARTRATE 0.05 MCG/KG/MIN: 1 INJECTION, SOLUTION, CONCENTRATE INTRAVENOUS at 08:17

## 2023-11-16 RX ADMIN — NOREPINEPHRINE BITARTRATE 0.03 MCG/KG/MIN: 0.06 INJECTION, SOLUTION INTRAVENOUS at 15:06

## 2023-11-16 RX ADMIN — SODIUM CHLORIDE, POTASSIUM CHLORIDE, SODIUM LACTATE AND CALCIUM CHLORIDE: 600; 310; 30; 20 INJECTION, SOLUTION INTRAVENOUS at 15:57

## 2023-11-16 RX ADMIN — SODIUM CHLORIDE, POTASSIUM CHLORIDE, SODIUM LACTATE AND CALCIUM CHLORIDE: 600; 310; 30; 20 INJECTION, SOLUTION INTRAVENOUS at 10:20

## 2023-11-16 RX ADMIN — Medication 30 MG: at 09:57

## 2023-11-16 RX ADMIN — CALCIUM CHLORIDE INJECTION 0.5 G: 100 INJECTION, SOLUTION INTRAVENOUS at 13:10

## 2023-11-16 RX ADMIN — NOREPINEPHRINE BITARTRATE 12 MCG: 1 INJECTION, SOLUTION, CONCENTRATE INTRAVENOUS at 10:31

## 2023-11-16 RX ADMIN — NOREPINEPHRINE BITARTRATE 6 MCG: 1 INJECTION, SOLUTION, CONCENTRATE INTRAVENOUS at 09:50

## 2023-11-16 RX ADMIN — SODIUM CHLORIDE, POTASSIUM CHLORIDE, SODIUM LACTATE AND CALCIUM CHLORIDE: 600; 310; 30; 20 INJECTION, SOLUTION INTRAVENOUS at 07:30

## 2023-11-16 RX ADMIN — TAMSULOSIN HYDROCHLORIDE 0.4 MG: 0.4 CAPSULE ORAL at 17:22

## 2023-11-16 RX ADMIN — CLINDAMYCIN PHOSPHATE 900 MG: 900 INJECTION, SOLUTION INTRAVENOUS at 05:56

## 2023-11-16 RX ADMIN — Medication 1 UNITS: at 10:31

## 2023-11-16 RX ADMIN — INSULIN HUMAN 1 UNITS/HR: 1 INJECTION, SOLUTION INTRAVENOUS at 09:56

## 2023-11-16 RX ADMIN — Medication 0.5 UNITS: at 09:50

## 2023-11-16 RX ADMIN — PHENYLEPHRINE HYDROCHLORIDE 200 MCG: 10 INJECTION INTRAVENOUS at 08:10

## 2023-11-16 RX ADMIN — Medication 20 MG: at 12:37

## 2023-11-16 RX ADMIN — ROSUVASTATIN CALCIUM 40 MG: 20 TABLET, FILM COATED ORAL at 17:22

## 2023-11-16 RX ADMIN — SODIUM CHLORIDE, POTASSIUM CHLORIDE, SODIUM LACTATE AND CALCIUM CHLORIDE 250 ML: 600; 310; 30; 20 INJECTION, SOLUTION INTRAVENOUS at 15:33

## 2023-11-16 ASSESSMENT — ACTIVITIES OF DAILY LIVING (ADL)
ADLS_ACUITY_SCORE: 22
ADLS_ACUITY_SCORE: 18
ADLS_ACUITY_SCORE: 18
ADLS_ACUITY_SCORE: 22
ADLS_ACUITY_SCORE: 18
ADLS_ACUITY_SCORE: 33
ADLS_ACUITY_SCORE: 22
ADLS_ACUITY_SCORE: 18

## 2023-11-16 ASSESSMENT — LIFESTYLE VARIABLES: TOBACCO_USE: 1

## 2023-11-16 NOTE — ANESTHESIA CARE TRANSFER NOTE
Patient: Aquiles Mckeon    Procedure: Procedure(s):  Median Sternotomy, Cardiopulmonary Bypass, Intraoperative Transesophageal Echocardiogram, Coronary Artery Bypass Graft x 2, Left Internal Mammary Artery [place], Endoscopic [place] of Left Greater Saphenous Vein, Intraoperative Epi-Aortic Untrasound       Diagnosis: CAD (coronary artery disease) [I25.10]  Diagnosis Additional Information: No value filed.    Anesthesia Type:   General     Note:    Oropharynx: endotracheal tube in place and ventilatory support  Level of Consciousness: iatrogenic sedation  Patient oxygen source: vented.    Independent Airway: airway patency not satisfactory and stable (vent)  Dentition: dentition unchanged  Vital Signs Stable: post-procedure vital signs reviewed and stable  Report to RN Given: handoff report given  Patient transferred to: ICU  Comments: Patient transported on monitor, remains on pressors and sedation, VSS, report given to RN.    ICU Handoff: Call for PAUSE to initiate/utilize ICU HANDOFF, Identified Patient, Identified Responsible Provider, Reviewed the Pertinent Medical History, Discussed Surgical Course, Reviewed Intra-OP Anesthesia Management and Issues during Anesthesia, Set Expectations for Post Procedure Period and Allowed Opportunity for Questions and Acknowledgement of Understanding    Vitals:  Vitals Value Taken Time   BP     Temp     Pulse 87 11/16/23 1455   Resp 13 11/16/23 1455   SpO2 99 % 11/16/23 1455   Vitals shown include unfiled device data.    Electronically Signed By: DOUG Francis CRNA  November 16, 2023  2:55 PM

## 2023-11-16 NOTE — H&P
CV ICU H&P  11/16/2023    CO-MORBIDITIES:   Patient Active Problem List   Diagnosis    Hyperlipidemia with target LDL less than 160    History of colonic polyps    Erectile dysfunction, unspecified erectile dysfunction type    Advanced directives, counseling/discussion    History of tobacco use    Impaired fasting glucose    Chest pain    Abnormal cardiovascular stress test    Status post coronary angiogram    S/P CABG (coronary artery bypass graft)     ASSESSMENT: Aquiles Mckeon is a 73 year old male with PMH of severe multivessel CAD, prediabetes, HLD who underwent CABG x2 on 11/16/23 by Dr. Mulvihill.    LAYNE pre-bypass: Normal LV cavity size with mild hypertrophy and perserved EF. Normal RV   size and function. Normal LV diastolic function. PALOMO velocity > 40 cm/s.  No PFO by CFD. Trace TR. Trace MR. Trileaflet aortic valve with  mild-moderate calcification of non-coronary cusp with trivial AI. Pulmonic  valve functionally normal. No pleural or pericardial effusion. No echo evidence of aortic dissection.    LAYNE post-bypass: Global biventricular size and function unchanged on epinephrine infusion. No new RWMA. No new valvulopathies. No new pleural effusion. No echo evidence of aortic dissection.     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 Notify the MD for any acute changes in exam.  - Pain: fentanyl gtt, dexmedetomidine gtt. Scheduled tylenol. PRN tylenol, oxycodone, dilaudid.  - Sedation: propofol gtt    Pulmonary care:   Postoperative ventilation management  *PTA Prior smoker  - Intubated, ventilated  - Titrate supplemental oxygen to maintain saturation above 92%.  - Pulmonary hygiene: Incentive spirometer every 15- 30 minutes when awake, flutter valve, C&DB  - Fast trach candidate  - Daily SBT    Cardiovascular:    S/p CABG x 2 on 11/16/23 by Dr. Mulvihill  History of HTN  Recent echo on  11/16: Global biventricular size and function unchanged on epinephrine infusion. No new RWMA. No new valvulopathies. No new pleural effusion. No echo evidence of aortic dissection.   - Monitor hemodynamic status.   - Goal MAP>65, SBP<140  - ASA, PTA rosuvastatin   - BB hold  - Epi, norepi gtt; wean as tolerated  - Trend lactate    GI care/ Nutrition:   - NPO   - PPI  - Continue bowel regimen: miralax, senna    Renal/ Fluid Balance/ Electrolytes:   BL creat appears to be ~ 0.93  - Strict I/O, daily weights  - Avoid/limit nephrotoxins as able  - Replete lytes PRN per protocol    Endocrine:    Stress induced hyperglycemia  Preop A1c 5.9  - Insulin gtt  - Goal BG <180 for optimal healing    ID/ Antibiotics:  Stress induced leukocytosis  - To complete perioperative regimen  - Continue to monitor fever curve, WBC and inflammatory markers as appropriate    Heme:     Stress induced leukocytosis  Acute blood loss anemia  Acute blood loss thrombocytopenia  No s/sx active bleeding  - Continue to monitor  - CBC     MSK/ Skin:  Sternotomy  Surgical Incision  - Sternal precautions  - Postoperative incision management per protocol  - PT/OT/CR    Prophylaxis:    - Mechanical prophylaxis for DVT  - Chemical DVT prophylaxis - start subcutaneous heparin tomorrow  - PPI    Lines/ tubes/ drains:  - Arterial Line, ETT, CTs, RIJ, bradford, paravertebral blocks    Disposition:  - CVICU    ICU Checklist  F - Feeding: NPO  A - Analgesia: propofol, Dexmedetomidine  S - Sedation: Dexmedetomidine  T - Thromboembolic prophylaxis: Heparin subcutaneous tomorrow     H - Head of bed elevated  U - Ulcer prophylaxis: Pantoprazole  G - Glycemic control: SSI  S - SBT     B - Bowel regimen: Miralax, senna-docusate  I - Indwelling catheter: [place]  D - De-escalation of antibiotics: Perioperative abx regimen    Clinically Significant Risk Factors Present on Admission        # Hyperkalemia: Highest K = 6 mmol/L in last 2 days, will monitor as appropriate   #  "Hypocalcemia: Lowest iCa = 3.7 mg/dL in last 2 days, will monitor and replace as appropriate      # Coagulation Defect: INR = 1.68 (Ref range: 0.85 - 1.15) and/or PTT = 29 Seconds (Ref range: 22 - 38 Seconds), will monitor for bleeding  # Drug Induced Platelet Defect: home medication list includes an antiplatelet medication     # Acute Respiratory Failure: based on blood gas results.  Continue supplemental oxygen as needed     # Obesity: Estimated body mass index is 33.17 kg/m  as calculated from the following:    Height as of this encounter: 1.664 m (5' 5.5\").    Weight as of this encounter: 91.8 kg (202 lb 6.1 oz).           # Anemia: based on hgb <11         Patient seen, findings and plan discussed with CVICU staff    Luann Dukes MD  Anesthesiology CA-1/PGY-2  ====================================    HPI:   Aquiles Mckeon is a 73 year old male with PMH of severe multivessel CAD, prediabetes, HLD who underwent CABG x2 on 11/16/23 by Dr. Mulvihill.    PAST MEDICAL HISTORY:   Past Medical History:   Diagnosis Date    ED (erectile dysfunction)     Hx of colonic polyp 01/01/2001       PAST SURGICAL HISTORY:   Past Surgical History:   Procedure Laterality Date    COLONOSCOPY  2001    Reported as having a polyp removed    COLONOSCOPY  2004    Clear by report    COLONOSCOPY N/A 1/29/2016    Procedure: COLONOSCOPY;  Surgeon: Juanjo Martínez MD;  Location: [facility] OR    COLONOSCOPY N/A 4/19/2022    Procedure: COLONOSCOPY, FLEXIBLE, WITH LESION REMOVAL USING SNARE;  Surgeon: Jian Mixon DO;  Location: [facility]    COLONOSCOPY WITH CO2 INSUFFLATION N/A 1/29/2016    Procedure: COLONOSCOPY WITH CO2 INSUFFLATION;  Surgeon: Juanjo Martínez MD;  Location: [facility]    COLONOSCOPY WITH CO2 INSUFFLATION N/A 4/19/2022    Procedure: COLONOSCOPY, WITH CO2 INSUFFLATION;  Surgeon: Jian Mixon DO;  Location: [facility] OR       FAMILY HISTORY:   Family History   Problem Relation Age of Onset    Cervical " Cancer Mother     Lung Cancer Mother     Heart Disease Father         CHF    Emphysema Father     Coronary Artery Disease Father         MI in 50s    Diabetes No family hx of     Hypertension No family hx of     Breast Cancer No family hx of     Prostate Cancer No family hx of     Mental Illness No family hx of     Osteoporosis No family hx of     Obesity No family hx of        SOCIAL HISTORY:   Social History     Tobacco Use    Smoking status: Former     Packs/day: 0.80     Years: 40.00     Additional pack years: 0.00     Total pack years: 32.00     Types: Cigarettes     Quit date: 2015     Years since quittin.5     Passive exposure: Never    Smokeless tobacco: Never    Tobacco comments:     quit smoking 2015   Substance Use Topics    Alcohol use: Yes     Alcohol/week: 10.0 standard drinks of alcohol     Types: 10 Standard drinks or equivalent per week     OBJECTIVE:   1. VITAL SIGNS:    Temp: 98.1  F (36.7  C) Temp src: Oral BP: (!) 120/91 Pulse: 87   Resp: 18 SpO2: 97 % O2 Device: Nasal cannula Oxygen Delivery: 2 LPM     2. INTAKE/ OUTPUT:   Intake/Output Summary (Last 24 hours) at 2023 1531  Last data filed at 2023 1500  Gross per 24 hour   Intake 3340 ml   Output 2180 ml   Net 1160 ml      3. PHYSICAL EXAMINATION:   General: sedated  Neuro: sedated  Resp: intubated, ventilated  CV: S1, S2, RRR, no m/r/g   Abdomen: Soft, non-distended, non-tender  Incisions: c/d/i  Extremities: warm and well perfused  CT: To suction, serosang output, no airleak    4. INVESTIGATIONS:   Arterial Blood Gases   Recent Labs   Lab 23  1335 23  1305 23  1236 23  1212   PH 7.36 7.29* 7.36 7.34*   PCO2 36 51* 44 45   PO2 99 167* 307* 304*   HCO3 20* 24 25 24     Complete Blood Count   Recent Labs   Lab 23  1335 23  1313 23  1305 23  1236 23  1212   HGB 8.9*  --  9.8* 10.0* 10.0*   PLT  --  134*  --   --   --      Basic Metabolic Panel  Recent Labs   Lab  11/16/23  1335 11/16/23  1305 11/16/23  1236 11/16/23  1212    139 137 136   POTASSIUM 3.6 4.4 5.7* 5.6*   * 159* 171* 179*     Liver Function Tests  Recent Labs   Lab 11/16/23  1313   INR 1.68*     Pancreatic Enzymes  No lab results found in last 7 days.  Coagulation Profile  Recent Labs   Lab 11/16/23  1313   INR 1.68*   PTT 29         5. RADIOLOGY:   Recent Results (from the past 24 hour(s))   POC US Guidance Needle Placement    Impression    Bilateral paravertebral catheter placement   LAYNE with Report    Narrative    Charbel Ely DO     11/16/2023  1:45 PM  Perioperative LAYNE Procedure Note    Staff -        Anesthesiologist:  Rafael Raman MD       Resident/Fellow: Charbel Ely DO       Performed By: fellow  Preanesthesia Checklist:  Patient identified, IV assessed, risks and   benefits discussed, monitors and equipment assessed, procedure being   performed at surgeon's request and anesthesia consent obtained.    LAYNE Probe Insertion    Probe Status PRE Insertion: NO obvious damage  Probe type:  Adult 3D  Bite block used:   Soft  Insertion Technique: Jaw Lift  Insertion complications: None obvious  Billing Report:LAYNE report by Anesthesiologist (See Separate Report note)  Probe Status POST Removal: NO obvious damage    LAYNE Report  General Procedure Information  Images for this study have been archived.  Modalities: Color flow mapping, CW Doppler, PW Doppler, 3D and 2D  Echocardiographic and Doppler Measurements  Right Ventricle:  Cavity size normal.    Hypertrophy not present.     Thrombus not present.    Global function normal.     Left Ventricle:    Hypertrophy present.   Thrombus not present.   Global   Function normal.       Ventricular Regional Function:  1- Basal Anteroseptal:  normal  2- Basal Anterior:  normal  3- Basal Anterolateral:  normal  4- Basal Inferolateral:  normal  5- Basal Inferior:  normal  6- Basal Inferoseptal:  normal  7- Mid Anteroseptal:  normal  8- Mid Anterior:   normal  9- Mid Anterolateral:  normal  10- Mid Inferolateral:  normal  11- Mid Inferior:  normal  12- Mid Inferoseptal:  normal  13- Apical Anterior:  normal  14- Apical Lateral:  normal  15- Apical Inferior:  normal  16- Apical Septal:  normal  17- [place]:  normal    Valves  Aortic Valve: Annulus calcified.  Stenosis not present.  Regurgitation   absent.  Leaflets normal.  Leaflet motions normal.    Mitral Valve: Annulus normal.  Stenosis not present.  Regurgitation +1.    Leaflets normal.  Leaflet motions normal.    Tricuspid Valve: Annulus normal.  Stenosis not present.  Regurgitation +1.    Leaflets normal.  Leaflet motions normal.    Pulmonic Valve: Annulus normal.      Aorta: Ascending Aorta: Size normal.  Dissection not present.    Aortic Arch: Size normal.    Dissection not present.      Descending Aorta: Size normal.          Right Atrium:  Size normal.   Spontaneous echo contrast not present.     Thrombus not present.   Tumor not present.   Device not present.     Left Atrium: Size normal.  Spontaneous echo contrast not present.    Thrombus not present.  Tumor not present.  Device not present.    Left atrial appendage normal.     Atrial Septum: Intra-atrial septal morphology normal.       Ventricular Septum: Intra-ventricular septum morphology normal.        Other Findings:   Pericardium:  normal. Pleural Effusion:  none. Pulmonary Arteries:    normal. Pulmonary Venous Flow:  normal. No coronary sinus catheter   present.    Post Intervention Findings  Procedure(s) performed:  CABG. Global function:  Unchanged. Regional wall   motion: Unchanged. Surgeon(s) notified of all postintervention findings:   Yes.                 Echocardiogram Comments  Echocardiogram comments: Pre-CPB:  Normal LV cavity size with mild hypertrophy and perserved EF. Normal RV   size and function. Normal LV diastolic function. PALOMO velocity > 40 cm/s.   No PFO by CFD. Trace TR. Trace MR. Trileaflet aortic valve with   mild-moderate  calcification of non-coronary cusp with trivial AI. Pulmonic   valve functionally normal. No pleural or pericardial effusion. No echo   evidence of aortic dissection. Findings communicated with surgical team.    Post-CP:  Global biventricular size and function unchanged on epinephrine infusion.   No new RWMA. No new valvulopathies. No new pleural effusion. No echo   evidence of aortic dissection. Findings communicated with surgical team..       =========================================

## 2023-11-16 NOTE — ANESTHESIA PROCEDURE NOTES
Perioperative LAYNE Procedure Note    Staff -        Anesthesiologist:  Rafael Raman MD       Resident/Fellow: Charbel Ely DO       Performed By: fellow  Preanesthesia Checklist:  Patient identified, IV assessed, risks and benefits discussed, monitors and equipment assessed, procedure being performed at surgeon's request and anesthesia consent obtained.    LAYNE Probe Insertion    Probe Status PRE Insertion: NO obvious damage  Probe type:  Adult 3D  Bite block used:   Soft  Insertion Technique: Jaw Lift  Insertion complications: None obvious  Billing Report:LAYNE report by Anesthesiologist (See Separate Report note)  Probe Status POST Removal: NO obvious damage    LAYNE Report  General Procedure Information  Images for this study have been archived.  Modalities: Color flow mapping, CW Doppler, PW Doppler, 3D and 2D  Echocardiographic and Doppler Measurements  Right Ventricle:  Cavity size normal.    Hypertrophy not present.   Thrombus not present.    Global function normal.     Left Ventricle:    Hypertrophy present.   Thrombus not present.   Global Function normal.       Ventricular Regional Function:  1- Basal Anteroseptal:  normal  2- Basal Anterior:  normal  3- Basal Anterolateral:  normal  4- Basal Inferolateral:  normal  5- Basal Inferior:  normal  6- Basal Inferoseptal:  normal  7- Mid Anteroseptal:  normal  8- Mid Anterior:  normal  9- Mid Anterolateral:  normal  10- Mid Inferolateral:  normal  11- Mid Inferior:  normal  12- Mid Inferoseptal:  normal  13- Apical Anterior:  normal  14- Apical Lateral:  normal  15- Apical Inferior:  normal  16- Apical Septal:  normal  17- [place]:  normal    Valves  Aortic Valve: Annulus calcified.  Stenosis not present.  Regurgitation absent.  Leaflets normal.  Leaflet motions normal.    Mitral Valve: Annulus normal.  Stenosis not present.  Regurgitation +1.  Leaflets normal.  Leaflet motions normal.    Tricuspid Valve: Annulus normal.  Stenosis not present.  Regurgitation +1.   Leaflets normal.  Leaflet motions normal.    Pulmonic Valve: Annulus normal.      Aorta: Ascending Aorta: Size normal.  Dissection not present.    Aortic Arch: Size normal.    Dissection not present.      Descending Aorta: Size normal.          Right Atrium:  Size normal.   Spontaneous echo contrast not present.   Thrombus not present.   Tumor not present.   Device not present.     Left Atrium: Size normal.  Spontaneous echo contrast not present.  Thrombus not present.  Tumor not present.  Device not present.    Left atrial appendage normal.     Atrial Septum: Intra-atrial septal morphology normal.       Ventricular Septum: Intra-ventricular septum morphology normal.        Other Findings:   Pericardium:  normal. Pleural Effusion:  none. Pulmonary Arteries:  normal. Pulmonary Venous Flow:  normal. No coronary sinus catheter present.    Post Intervention Findings  Procedure(s) performed:  CABG. Global function:  Unchanged. Regional wall motion: Unchanged. Surgeon(s) notified of all postintervention findings: Yes.                 Echocardiogram Comments  Echocardiogram comments: Pre-CPB:  Normal LV cavity size with mild hypertrophy and perserved EF. Normal RV size and function. Normal LV diastolic function. PALOMO velocity > 40 cm/s. No PFO by CFD. Trace TR. Trace MR. Trileaflet aortic valve with mild-moderate calcification of non-coronary cusp with trivial AI. Pulmonic valve functionally normal. No pleural or pericardial effusion. No echo evidence of aortic dissection. Findings communicated with surgical team.    Post-CP:  Global biventricular size and function unchanged on epinephrine infusion. No new RWMA. No new valvulopathies. No new pleural effusion. No echo evidence of aortic dissection. Findings communicated with surgical team..

## 2023-11-16 NOTE — OP NOTE
Date of Surgery: 11/16/2023  Preop Diagnosis: mvCAD  Postop Diagnosis: mvCAD  Surgeon : Michael S. Mulvihill, M.D.  Assistant{s) : Dr. Kruger , and Vandana Lucas PA-C  Anesthesia: GET  Procedures:  1. CABG x2 (LIMA-LAD, v-OM)  2. Endoscopic Vein [place]  3. Epiaortic Echo    Approach : Median sternotomy  Drains: 1 left pleural 28 fr, 2x mediastinal 32 fr  Pacing Wires: atrial and ventricular  Findings of Procedure:  severe CAD    Estimated Blood Loss: 300 ml    Blood Products Administered : 1U PLT  Disposition : ICU  Specimens : none  Indication : mvCAD    GRAFT MATRIX:  Distal: Graft Source: Target Quality: Conduit Quality: Distal Suture: Proximal Suture:  LAD   BECK  Good  Good  7-0  N/A  OM   RSVG  Good  Good  7-0  6-0    Operative Indications / Brief History of Present Illness: 73M with stable angina, preserved LVEF. Workup included coronary angiography which revealed severe mvCAD.    Procedure in Detail:  The risks, benefits, complications, treatment options, and expected outcomes were discussed with the patient. The possibilities of reaction to medication, pulmonary aspiration, perforation of viscus, bleeding, recurrent infection, the need for additional procedures, failure to diagnose a condition, and creating a complication requiring transfusion or operation were discussed with the patient. The patient concurred with the proposed plan, giving informed consent. The patient was taken to the operating room, identified as Aquiles Mckeon and the procedure verified as Coronary Artery Bypass Grafting. A Time Out was held and the above information confirmed.    Standard monitoring lines and Sullivan catheter were placed. General anesthesia was induced. The patient was prepped and draped in a sterile fashion.     An endoscopic vein harvesting was carried out by Vandana MORLEY.     A standard median sternotomy was performed. The left internal mammary artery was taken down using cautery and clips. Intravenous  heparin was given at the appropriate dose to obtain anticoagulation sufficient for CPB. The LIMA was then transected distally. The flow was excellent.    The pericardium was then opened and the pericardial well was created.     Epiaortic echo was then completed. Images of the site for cannulation, cross clamp application and proximal anastomoses were recorded and personally interpreted by me. There was no significant plaque in any of these sites.    Concentric purse strings were placed and the aorta was cannulated with a 20fr EOPA cannula. After adequate de-airing this cannula was connected to the CPB circuit. A purse string was placed around the right atrial appendage and it was cannulated with a dual stage venous cannula.     Prior to initiating CPB the LIMA was fashioned to the appropriate length.    Cardiopulmonary bypass was then initiated.     The distal targets (LAD, OM) were then evaluated and marked.    An antegrade vent/cardioplegia cannula was then placed. The aortic cross clamp was then applied and the heart was arrested with 1200 cc antegrade [place] cardioplegia. Cardioplegia was then administered intermittently to ensure adequate myocardial protection.     The distal anastomosis to the OM was completed first. After positioning the heart, the artery was opened and RSV was ananstomosed to the artery in an end-to-side fashion using 7-0 prolene in a running fashion. Prior to completing the anastomosis it was probed proximally and distally to ensure patency.    Next the LIMA to LAD anastomosis was completed in a similar an end-to-side fashion with 7-0 prolene. Prior to completing the anastomosis it was probed to ensure patency.    The proximal anastomosis was then completed in the standard fashion using 6-0 prolene in a running fashion.     After adequate deairing maneuvers, the cross clamp was removed and the heart was reperfused.    Examination of all surgical sites revealed good hemostasis. Atrial and  ventricular epicardial pacing wires were then placed. The patient was fully ventillated and successfully weaned off of CPB. After a test dose of protamine, the patient was fully decannulated. The heparin was fully reversed with protamine. Again, examination of all surgical sites were evaluated for hemostasis which was good.    Post-bypass LAYNE revealed preserved RV/LV function and no new wall motion abnormalities.    Left pleural as well as mediastinal drains were placed.   The sternum was closed using stainless steel wires.   The dermal and subcutaneous tissues were closed in layers and the skin was sewn closed with a subcuticular stitch.    At the end of the operation, all sponge, instruments, and needle counts were correct.    The patient tolerated the procedure without complication and was transported to the CTICU in stable condition.    I was present and scrubbed for the entire procedure.    Michael Mulvihill, MD  11/16/2023 @ 5:10 PM

## 2023-11-16 NOTE — ANESTHESIA PROCEDURE NOTES
"Paravertebral Procedure Note    Pre-Procedure   Staff -        Anesthesiologist:  Viraj Naqvi MD       Performed By: anesthesiologist       Location: pre-op       Pre-Anesthestic Checklist: patient identified, IV checked, site marked, risks and benefits discussed, informed consent, monitors and equipment checked, pre-op evaluation, at physician/surgeon's request and post-op pain management  Timeout:       Correct Patient: Yes        Correct Procedure: Yes        Correct Site: Yes        Correct Position: Yes        Correct Laterality: Yes        Site Marked: Yes  Procedure Documentation  Procedure: Paravertebral       Diagnosis: POST OPERATIVE PAIN       Laterality: bilateral       Patient Position: prone       Patient Prep/Sterile Barriers: sterile gloves, mask, patient draped       Skin prep: Chloraprep       Local skin infiltrated with 4 mL of 1% lidocaine.        Insertion site: T3.       Needle Type: short bevel       Needle Gauge: 17.        Needle Length (Inches): 3.13        Catheter: 19 G.          Catheter threaded easily.             Ultrasound guided       1. Ultrasound was used to identify targeted nerve, plexus, vascular marker, or fascial plane and place a needle adjacent to it in real-time.       2. Ultrasound was used to visualize the spread of anesthetic in close proximity to the above referenced structure.       3. A permanent image is entered into the patient's record.    Assessment/Narrative         The placement was negative for: blood aspirated, painful injection and site bleeding       Paresthesias: No.       Bolus given via catheter. no blood aspirated via catheter.        Secured via Tegaderm and Dermabond.        Insertion/Infusion Method: Continuous Infusion       Complications: none       Injection made incrementally with aspirations every 5 mL.      FOR [facility] ([facility]) ONLY:   Pain Team Contact information: please page the Pain Team Via [employer]. Search \"Pain\". During " daytime hours, please page the attending first. At night please page the resident first.

## 2023-11-16 NOTE — PROGRESS NOTES
[facility], Procedure Note          Extubation:       Aquiles Mckeon  MRN# [id number]   5:31 PM, November 16, 2023         Patient extubated at: 5:31 PM, November 16, 2023   Supplemental Oxygen: Via nasal cannula at 3 liters per minute   Cough: The cough is good   Secretion Mode: Able to clear   Secretion Amount: Small amount, frothy and clear in color   Respiratory Exam:: Breath sounds: diminished     Location: bilaterally   Skin Exam:: Patient color: natural   Patient Status: Currently appears comfortable   Arterial Blood Gasses: See ABG   Patient is able to verbalize. No adverse reaction.             Recorded by Mark Glass, RT

## 2023-11-16 NOTE — OR NURSING
Paravertebral catheters placed without complications.  1mg versed and 50mcg fentanyl given. VSS on 2L NC.  Pt tolerated well.  Will continue to monitor. Lyla Anaya RN on 11/16/2023 at 6:57 AM

## 2023-11-16 NOTE — ANESTHESIA PROCEDURE NOTES
Central Line/PA Catheter Placement    Pre-Procedure   Staff -        Anesthesiologist:  Rafael Raman MD       Resident/Fellow: Charbel Ely DO       Performed By: fellow       Location: OR       Pre-Anesthestic Checklist: risks and benefits discussed  Timeout:       Correct Patient: Yes        Correct Procedure: Yes        Correct Site: Yes        Correct Position: Yes        Correct Laterality: Yes   Line Placement:   This line was placed Post Induction starting at 8/4/2023 9:31 AM and ending at 11/16/2023 8:11 AM    Procedure   Procedure: central line       Laterality: right       Insertion Site: internal jugular.       Patient Position: Trendelenburg  Sterile Prep        All elements of maximal sterile barrier technique followed       Patient Prep/Sterile Barriers: draped, hand hygiene, gloves , hat , mask , draped, gown, sterile gel and probe cover       Skin prep: Chloraprep  Insertion/Injection        Technique: ultrasound guided        1. Ultrasound was used to evaluate the access site.       2. Vein evaluated via ultrasound for patency/adequacy.       3. Using real-time ultrasound the needle/catheter was observed entering the artery/vein.       Introducer Type: 9 Fr, 2-lumen MAC        Type: Introducer  Narrative         Secured by: suture       Biopatch and Tegaderm dressing used.       Complications: None apparent,        All lumens flushed: Yes       Verification method: LAYNE and Placement to be verified post-op

## 2023-11-16 NOTE — ANESTHESIA PROCEDURE NOTES
Airway       Patient location during procedure: OR       Procedure Start/Stop Times: 11/16/2023 7:58 AM  Staff -        Performed By: SRNAIndications and Patient Condition       Indications for airway management: keon-procedural       Induction type:intravenous       Mask difficulty assessment: 0 - not attempted    Final Airway Details       Final airway type: endotracheal airway       Successful airway: ETT - single  Endotracheal Airway Details        ETT size (mm): 8.0       Successful intubation technique: direct laryngoscopy       DL Blade Type: Collins 2       Grade View of Cords: 1       Adjucts: stylet       Position: Right       Measured from: gums/teeth       Secured at (cm): 23       Bite block used: None    Post intubation assessment        Placement verified by: capnometry and equal breath sounds        Number of attempts at approach: 1       Number of other approaches attempted: 0       Ease of procedure: easy       Dentition: Unchanged and Intact    Medication(s) Administered   Medication Administration Time: 11/16/2023 7:58 AM

## 2023-11-16 NOTE — ANESTHESIA PROCEDURE NOTES
Acute Normovolemic Hemodilution Note    Pre-Procedure   Staff -        Anesthesiologist:  Rafael Raman MD       Resident/Fellow: Charbel Ely DO       Performed By: fellow       Location: In OR after induction       Pre-Anesthestic Checklist: patient identified, IV checked, site marked, risks and benefits discussed, informed consent, monitors and equipment checked, pre-op evaluation and at physician/surgeon's request   Procedure: Normovolemic Hemodilution  Date/Time of Collection:  11/16/2023 8:12 AM  Date/Time of Expiration:11/17/2023 1:59 PM  Volume Collected: 450 ml  Amount Infused: 450 ml   Provider Collecting: Rafael Raman MD   Transfusion Date/Time: 11/16/2023 1:59 PM

## 2023-11-16 NOTE — ANESTHESIA PROCEDURE NOTES
Arterial Line Procedure Note    Pre-Procedure   Staff -        Anesthesiologist:  Rafael Raman MD       Performed By: SRNA and anesthesiologist       Location: OR       Pre-Anesthestic Checklist: patient identified, IV checked, risks and benefits discussed, informed consent, monitors and equipment checked, pre-op evaluation and at physician/surgeon's request  Timeout:       Correct Patient: Yes        Correct Procedure: Yes        Correct Site: Yes        Correct Position: Yes   Line Placement:   This line was placed Pre Induction starting at 11/16/2023 7:40 AM and ending at 11/16/2023 7:55 AM  Procedure   Procedure: arterial line       Laterality: left       Insertion Site: radial.  Sterile Prep        Standard elements of sterile barrier followed       Skin prep: Chloraprep  Insertion/Injection        Technique: ultrasound guided and Seldinger Technique        1. Ultrasound was used to evaluate the access site.       2. Artery evaluated via ultrasound for patency/adequacy.       3. Using real-time ultrasound the needle/catheter was observed entering the artery/vein.       Catheter Type/Size: 20 G, 1.75 in/4.5 cm quick cath (integral wire)  Narrative         Secured by: suture       Tegaderm dressing used.       Complications: None apparent,        Arterial waveform: Yes        IBP within 10% of NIBP: Yes

## 2023-11-16 NOTE — ANESTHESIA POSTPROCEDURE EVALUATION
Patient: Aquiles Mckeon    Procedure: Procedure(s):  Median Sternotomy, Cardiopulmonary Bypass, Intraoperative Transesophageal Echocardiogram, Coronary Artery Bypass Graft x 2, Left Internal Mammary Artery [place], Endoscopic [place] of Left Greater Saphenous Vein, Intraoperative Epi-Aortic Untrasound       Anesthesia Type:  General    Note:  Disposition: Inpatient; ICU            ICU Sign Out: Anesthesiologist/ICU physician sign out WAS performed   Postop Pain Control: Uneventful            Sign Out: Well controlled pain   PONV: No   Neuro/Psych: Uneventful            Sign Out: Acceptable/Baseline neuro status   Airway/Respiratory: Uneventful            Sign Out: AIRWAY IN SITU/Resp. Support               Airway in situ/Resp. Support: ETT   CV/Hemodynamics: Uneventful            Sign Out: Acceptable CV status; No obvious hypovolemia; No obvious fluid overload   Other NRE: NONE   DID A NON-ROUTINE EVENT OCCUR? No           Last vitals:  Vitals:    11/16/23 0655 11/16/23 0700 11/16/23 0705   BP: 111/75 (!) 120/91    Pulse: 82 90 87   Resp:      Temp:      SpO2: 97% 94% 97%       Electronically Signed By: Rafael Raman MD  November 16, 2023  3:14 PM

## 2023-11-17 ENCOUNTER — APPOINTMENT (OUTPATIENT)
Dept: GENERAL RADIOLOGY | Facility: CLINIC | Age: 73
DRG: 236 | End: 2023-11-17
Attending: PHYSICIAN ASSISTANT
Payer: COMMERCIAL

## 2023-11-17 ENCOUNTER — APPOINTMENT (OUTPATIENT)
Dept: OCCUPATIONAL THERAPY | Facility: CLINIC | Age: 73
DRG: 236 | End: 2023-11-17
Attending: PHYSICIAN ASSISTANT
Payer: COMMERCIAL

## 2023-11-17 LAB
ALBUMIN SERPL BCG-MCNC: 3.3 G/DL (ref 3.5–5.2)
ALLEN'S TEST: NORMAL
ALP SERPL-CCNC: 42 U/L (ref 40–150)
ALT SERPL W P-5'-P-CCNC: 34 U/L (ref 0–70)
ANION GAP SERPL CALCULATED.3IONS-SCNC: 15 MMOL/L (ref 7–15)
ANION GAP SERPL CALCULATED.3IONS-SCNC: 8 MMOL/L (ref 7–15)
AST SERPL W P-5'-P-CCNC: 61 U/L (ref 0–45)
BASE EXCESS BLDA CALC-SCNC: 1.8 MMOL/L (ref -9–1.8)
BILIRUB SERPL-MCNC: 0.8 MG/DL
BUN SERPL-MCNC: 19.5 MG/DL (ref 8–23)
BUN SERPL-MCNC: 22.1 MG/DL (ref 8–23)
CA-I BLD-MCNC: 4.5 MG/DL (ref 4.4–5.2)
CALCIUM SERPL-MCNC: 8.2 MG/DL (ref 8.8–10.2)
CALCIUM SERPL-MCNC: 8.2 MG/DL (ref 8.8–10.2)
CHLORIDE SERPL-SCNC: 104 MMOL/L (ref 98–107)
CHLORIDE SERPL-SCNC: 97 MMOL/L (ref 98–107)
CREAT SERPL-MCNC: 1.16 MG/DL (ref 0.67–1.17)
CREAT SERPL-MCNC: 1.21 MG/DL (ref 0.67–1.17)
DEPRECATED HCO3 PLAS-SCNC: 20 MMOL/L (ref 22–29)
DEPRECATED HCO3 PLAS-SCNC: 25 MMOL/L (ref 22–29)
EGFRCR SERPLBLD CKD-EPI 2021: 64 ML/MIN/1.73M2
EGFRCR SERPLBLD CKD-EPI 2021: 67 ML/MIN/1.73M2
ERYTHROCYTE [DISTWIDTH] IN BLOOD BY AUTOMATED COUNT: 13.2 % (ref 10–15)
GLUCOSE BLDC GLUCOMTR-MCNC: 102 MG/DL (ref 70–99)
GLUCOSE BLDC GLUCOMTR-MCNC: 104 MG/DL (ref 70–99)
GLUCOSE BLDC GLUCOMTR-MCNC: 106 MG/DL (ref 70–99)
GLUCOSE BLDC GLUCOMTR-MCNC: 114 MG/DL (ref 70–99)
GLUCOSE BLDC GLUCOMTR-MCNC: 123 MG/DL (ref 70–99)
GLUCOSE BLDC GLUCOMTR-MCNC: 146 MG/DL (ref 70–99)
GLUCOSE SERPL-MCNC: 113 MG/DL (ref 70–99)
GLUCOSE SERPL-MCNC: 139 MG/DL (ref 70–99)
HCO3 BLD-SCNC: 27 MMOL/L (ref 21–28)
HCT VFR BLD AUTO: 31.8 % (ref 40–53)
HGB BLD-MCNC: 10.9 G/DL (ref 13.3–17.7)
LACTATE SERPL-SCNC: 0.9 MMOL/L (ref 0.7–2)
LACTATE SERPL-SCNC: 0.9 MMOL/L (ref 0.7–2)
LACTATE SERPL-SCNC: 1.2 MMOL/L (ref 0.7–2)
MAGNESIUM SERPL-MCNC: 2.1 MG/DL (ref 1.7–2.3)
MCH RBC QN AUTO: 31 PG (ref 26.5–33)
MCHC RBC AUTO-ENTMCNC: 34.3 G/DL (ref 31.5–36.5)
MCV RBC AUTO: 90 FL (ref 78–100)
O2/TOTAL GAS SETTING VFR VENT: 28 %
OXYHGB MFR BLD: 96 % (ref 92–100)
PCO2 BLD: 43 MM HG (ref 35–45)
PH BLD: 7.4 [PH] (ref 7.35–7.45)
PHOSPHATE SERPL-MCNC: 5.5 MG/DL (ref 2.5–4.5)
PLATELET # BLD AUTO: 127 10E3/UL (ref 150–450)
PO2 BLD: 94 MM HG (ref 80–105)
POTASSIUM SERPL-SCNC: 4.3 MMOL/L (ref 3.4–5.3)
POTASSIUM SERPL-SCNC: 4.7 MMOL/L (ref 3.4–5.3)
PROT SERPL-MCNC: 5.1 G/DL (ref 6.4–8.3)
RBC # BLD AUTO: 3.52 10E6/UL (ref 4.4–5.9)
SODIUM SERPL-SCNC: 132 MMOL/L (ref 135–145)
SODIUM SERPL-SCNC: 137 MMOL/L (ref 135–145)
WBC # BLD AUTO: 12.7 10E3/UL (ref 4–11)

## 2023-11-17 PROCEDURE — 99232 SBSQ HOSP IP/OBS MODERATE 35: CPT | Performed by: PHYSICIAN ASSISTANT

## 2023-11-17 PROCEDURE — 80053 COMPREHEN METABOLIC PANEL: CPT | Performed by: PHYSICIAN ASSISTANT

## 2023-11-17 PROCEDURE — 84100 ASSAY OF PHOSPHORUS: CPT | Performed by: PHYSICIAN ASSISTANT

## 2023-11-17 PROCEDURE — 83605 ASSAY OF LACTIC ACID: CPT

## 2023-11-17 PROCEDURE — 82330 ASSAY OF CALCIUM: CPT | Performed by: PHYSICIAN ASSISTANT

## 2023-11-17 PROCEDURE — 97530 THERAPEUTIC ACTIVITIES: CPT | Mod: GO

## 2023-11-17 PROCEDURE — 93005 ELECTROCARDIOGRAM TRACING: CPT

## 2023-11-17 PROCEDURE — 250N000013 HC RX MED GY IP 250 OP 250 PS 637: Performed by: PHYSICIAN ASSISTANT

## 2023-11-17 PROCEDURE — 250N000011 HC RX IP 250 OP 636: Mod: JZ

## 2023-11-17 PROCEDURE — 250N000013 HC RX MED GY IP 250 OP 250 PS 637: Performed by: NURSE PRACTITIONER

## 2023-11-17 PROCEDURE — 85027 COMPLETE CBC AUTOMATED: CPT | Performed by: PHYSICIAN ASSISTANT

## 2023-11-17 PROCEDURE — 250N000011 HC RX IP 250 OP 636: Performed by: PHYSICIAN ASSISTANT

## 2023-11-17 PROCEDURE — 250N000013 HC RX MED GY IP 250 OP 250 PS 637

## 2023-11-17 PROCEDURE — 82310 ASSAY OF CALCIUM: CPT

## 2023-11-17 PROCEDURE — 99233 SBSQ HOSP IP/OBS HIGH 50: CPT | Mod: 24 | Performed by: ANESTHESIOLOGY

## 2023-11-17 PROCEDURE — 120N000003 HC R&B IMCU UMMC

## 2023-11-17 PROCEDURE — 97165 OT EVAL LOW COMPLEX 30 MIN: CPT | Mod: GO

## 2023-11-17 PROCEDURE — 83735 ASSAY OF MAGNESIUM: CPT | Performed by: PHYSICIAN ASSISTANT

## 2023-11-17 PROCEDURE — 36415 COLL VENOUS BLD VENIPUNCTURE: CPT

## 2023-11-17 PROCEDURE — 250N000011 HC RX IP 250 OP 636: Performed by: STUDENT IN AN ORGANIZED HEALTH CARE EDUCATION/TRAINING PROGRAM

## 2023-11-17 PROCEDURE — 71045 X-RAY EXAM CHEST 1 VIEW: CPT

## 2023-11-17 PROCEDURE — 71045 X-RAY EXAM CHEST 1 VIEW: CPT | Mod: 26 | Performed by: STUDENT IN AN ORGANIZED HEALTH CARE EDUCATION/TRAINING PROGRAM

## 2023-11-17 PROCEDURE — 82805 BLOOD GASES W/O2 SATURATION: CPT | Performed by: PHYSICIAN ASSISTANT

## 2023-11-17 PROCEDURE — 250N000012 HC RX MED GY IP 250 OP 636 PS 637

## 2023-11-17 RX ORDER — NICOTINE POLACRILEX 4 MG
15-30 LOZENGE BUCCAL
Status: DISCONTINUED | OUTPATIENT
Start: 2023-11-17 | End: 2023-11-17

## 2023-11-17 RX ORDER — ACETAMINOPHEN 325 MG/1
975 TABLET ORAL EVERY 8 HOURS SCHEDULED
Status: DISPENSED | OUTPATIENT
Start: 2023-11-17 | End: 2023-11-19

## 2023-11-17 RX ORDER — METHOCARBAMOL 500 MG/1
500 TABLET, FILM COATED ORAL EVERY 6 HOURS SCHEDULED
Status: DISCONTINUED | OUTPATIENT
Start: 2023-11-17 | End: 2023-11-17

## 2023-11-17 RX ORDER — POLYETHYLENE GLYCOL 3350 17 G/17G
17 POWDER, FOR SOLUTION ORAL 2 TIMES DAILY
Status: DISCONTINUED | OUTPATIENT
Start: 2023-11-17 | End: 2023-11-19

## 2023-11-17 RX ORDER — HEPARIN SODIUM 5000 [USP'U]/.5ML
5000 INJECTION, SOLUTION INTRAVENOUS; SUBCUTANEOUS EVERY 8 HOURS SCHEDULED
Status: DISCONTINUED | OUTPATIENT
Start: 2023-11-17 | End: 2023-11-21 | Stop reason: HOSPADM

## 2023-11-17 RX ORDER — AMOXICILLIN 250 MG
2 CAPSULE ORAL 2 TIMES DAILY
Status: DISCONTINUED | OUTPATIENT
Start: 2023-11-17 | End: 2023-11-19

## 2023-11-17 RX ORDER — DEXTROSE MONOHYDRATE 25 G/50ML
25-50 INJECTION, SOLUTION INTRAVENOUS
Status: DISCONTINUED | OUTPATIENT
Start: 2023-11-17 | End: 2023-11-17

## 2023-11-17 RX ORDER — FUROSEMIDE 10 MG/ML
20 INJECTION INTRAMUSCULAR; INTRAVENOUS ONCE
Status: COMPLETED | OUTPATIENT
Start: 2023-11-17 | End: 2023-11-17

## 2023-11-17 RX ORDER — METHOCARBAMOL 750 MG/1
750 TABLET, FILM COATED ORAL EVERY 6 HOURS SCHEDULED
Status: DISCONTINUED | OUTPATIENT
Start: 2023-11-17 | End: 2023-11-21 | Stop reason: HOSPADM

## 2023-11-17 RX ORDER — FUROSEMIDE 10 MG/ML
40 INJECTION INTRAMUSCULAR; INTRAVENOUS ONCE
Status: COMPLETED | OUTPATIENT
Start: 2023-11-17 | End: 2023-11-17

## 2023-11-17 RX ADMIN — ACETAMINOPHEN 975 MG: 325 TABLET, FILM COATED ORAL at 08:29

## 2023-11-17 RX ADMIN — POLYETHYLENE GLYCOL 3350 17 G: 17 POWDER, FOR SOLUTION ORAL at 08:31

## 2023-11-17 RX ADMIN — PANTOPRAZOLE SODIUM 40 MG: 40 TABLET, DELAYED RELEASE ORAL at 08:29

## 2023-11-17 RX ADMIN — FUROSEMIDE 20 MG: 10 INJECTION, SOLUTION INTRAMUSCULAR; INTRAVENOUS at 10:14

## 2023-11-17 RX ADMIN — SENNOSIDES AND DOCUSATE SODIUM 2 TABLET: 50; 8.6 TABLET ORAL at 20:36

## 2023-11-17 RX ADMIN — THIAMINE HCL TAB 100 MG 100 MG: 100 TAB at 08:29

## 2023-11-17 RX ADMIN — METHOCARBAMOL 500 MG: 500 TABLET ORAL at 08:30

## 2023-11-17 RX ADMIN — HYDROMORPHONE HYDROCHLORIDE 0.4 MG: 0.2 INJECTION, SOLUTION INTRAMUSCULAR; INTRAVENOUS; SUBCUTANEOUS at 18:48

## 2023-11-17 RX ADMIN — POLYETHYLENE GLYCOL 3350 17 G: 17 POWDER, FOR SOLUTION ORAL at 20:36

## 2023-11-17 RX ADMIN — METHOCARBAMOL 500 MG: 500 TABLET ORAL at 14:39

## 2023-11-17 RX ADMIN — METHOCARBAMOL TABLETS 750 MG: 750 TABLET, COATED ORAL at 17:17

## 2023-11-17 RX ADMIN — HEPARIN SODIUM 5000 UNITS: 5000 INJECTION, SOLUTION INTRAVENOUS; SUBCUTANEOUS at 14:49

## 2023-11-17 RX ADMIN — CLINDAMYCIN PHOSPHATE 900 MG: 900 INJECTION, SOLUTION INTRAVENOUS at 02:24

## 2023-11-17 RX ADMIN — ROSUVASTATIN CALCIUM 40 MG: 20 TABLET, FILM COATED ORAL at 08:30

## 2023-11-17 RX ADMIN — Medication 12.5 MG: at 20:36

## 2023-11-17 RX ADMIN — INSULIN ASPART 1 UNITS: 100 INJECTION, SOLUTION INTRAVENOUS; SUBCUTANEOUS at 18:29

## 2023-11-17 RX ADMIN — FUROSEMIDE 40 MG: 10 INJECTION, SOLUTION INTRAVENOUS at 16:22

## 2023-11-17 RX ADMIN — ASPIRIN 81 MG CHEWABLE TABLET 162 MG: 81 TABLET CHEWABLE at 08:30

## 2023-11-17 RX ADMIN — HYDROMORPHONE HYDROCHLORIDE 0.2 MG: 0.2 INJECTION, SOLUTION INTRAMUSCULAR; INTRAVENOUS; SUBCUTANEOUS at 23:06

## 2023-11-17 RX ADMIN — METHOCARBAMOL TABLETS 750 MG: 750 TABLET, COATED ORAL at 23:42

## 2023-11-17 RX ADMIN — CLINDAMYCIN PHOSPHATE 900 MG: 900 INJECTION, SOLUTION INTRAVENOUS at 10:23

## 2023-11-17 RX ADMIN — HEPARIN SODIUM 5000 UNITS: 5000 INJECTION, SOLUTION INTRAVENOUS; SUBCUTANEOUS at 23:11

## 2023-11-17 RX ADMIN — Medication 12.5 MG: at 10:14

## 2023-11-17 RX ADMIN — TAMSULOSIN HYDROCHLORIDE 0.4 MG: 0.4 CAPSULE ORAL at 08:30

## 2023-11-17 RX ADMIN — FOLIC ACID 1 MG: 1 TABLET ORAL at 08:30

## 2023-11-17 RX ADMIN — Medication 1 TABLET: at 08:30

## 2023-11-17 RX ADMIN — HYDROMORPHONE HYDROCHLORIDE 0.4 MG: 0.2 INJECTION, SOLUTION INTRAMUSCULAR; INTRAVENOUS; SUBCUTANEOUS at 16:36

## 2023-11-17 RX ADMIN — SENNOSIDES AND DOCUSATE SODIUM 2 TABLET: 50; 8.6 TABLET ORAL at 08:29

## 2023-11-17 RX ADMIN — OXYCODONE HYDROCHLORIDE 10 MG: 10 TABLET ORAL at 15:22

## 2023-11-17 RX ADMIN — OXYCODONE HYDROCHLORIDE 10 MG: 10 TABLET ORAL at 08:31

## 2023-11-17 RX ADMIN — OXYCODONE HYDROCHLORIDE 10 MG: 10 TABLET ORAL at 02:18

## 2023-11-17 RX ADMIN — Medication 5 MG: at 17:17

## 2023-11-17 ASSESSMENT — ACTIVITIES OF DAILY LIVING (ADL)
ADLS_ACUITY_SCORE: 23
ADLS_ACUITY_SCORE: 22
ADLS_ACUITY_SCORE: 23
ADLS_ACUITY_SCORE: 22
ADLS_ACUITY_SCORE: 22
ADLS_ACUITY_SCORE: 23

## 2023-11-17 NOTE — PROGRESS NOTES
"Pain Service Progress Note  [facility]  Date: 11/17/2023       Patient Name: Aquiles Mckeon  MRN: [id number]  Age: 73 year old  Sex: male      Assessment:  Aquiles Mckeon is a 73 year old male who has PMH of severe multivessel CAD, prediabetes, HLD who underwent CABG x2, left Internal Mammary Artery [place], Endoscopic [place] of Left Greater Saphenous Vein on 11/16/23 by Dr. Mulvihill.       Procedure: CABG x2, left Internal Mammary Artery [place], Endoscopic [place] of Left Greater Saphenous Vein     Date of Surgery: 11/16/23    Date of Catheter Placement: 11/16/23 ---Please note this is a PV Catheter study patient.    Plan/Recommendations:  1. Regional Anesthesia/Analgesia  -Continuous Catheter Type/Site: bilateral paravertebral (PV)   Infusate: Ropivacaine 0.2%    Programmed Intermittent Bolus (PIB) at 7 mL Q60 min via each catheter, total infusion rate of 14 mL/hr    Plan to maintain catheter, max of 7 days    2. Anticoagulation  -Please contact Inpatient Pain Service before ordering or making any anticoagulation changes       3. Multimodal Analgesia  - per primary service    Pain Service will continue to follow.    Discussed with attending anesthesiologist    Luci Guillen PA-C  11/17/2023     Overnight Events: no major acute event    Tubes/Drains: Yes  Chest tubes    Subjective:  Aquiles reports intermittent soreness near/at the left PV catheter insertion site that started this morning.  Feels like a \"knuckle\" tightness that seemed to have come and go.  The insertion site was checked-it's clean, dry, intact, no erythema or swelling.  Tenderness with palpation at site.  At 1428, Checked in with RN who reports that this pain has resolved and no other concerns.      Symptoms of LAST: No    Pain Location:  Left upper back at nerve block catheter site    Pain Intensity:    Pain at Rest: 0/10     Satisfied with your level of pain control: Yes    Diet: Advance Diet as Tolerated: " "Regular Diet Adult    Relevant Labs:  Recent Labs   Lab Test 11/17/23  0429 11/16/23  1459   INR  --  1.44*   * 140*   PTT  --  25   BUN 19.5 13.5       Physical Exam:  Vitals: /75   Pulse 88   Temp 100.2  F (37.9  C) (Bladder)   Resp 18   Ht 1.664 m (5' 5.5\")   Wt 91.8 kg (202 lb 6.1 oz)   SpO2 92%   BMI 33.17 kg/m      Physical Exam:   Orientation:  Alert, oriented, and in no acute distress: Yes  Sedation: No    Motor Examination:  5/5 Strength in lower extremities: Yes        Catheter Site:   Catheter entry site is clean/dry/intact: Yes    Tender: Yes on the left insertion site, reportedly resolved in afternoon.      Relevant Medications:  Current Pain Medications:  Medications related to Pain Management (From now, onward)      Start     Dose/Rate Route Frequency Ordered Stop    11/19/23 0000  acetaminophen (TYLENOL) tablet 650 mg         650 mg Oral EVERY 4 HOURS PRN 11/16/23 1448      11/17/23 1400  [Held by provider]  acetaminophen (TYLENOL) tablet 975 mg        (On hold since today at 1127 until manually unheld; held by Luann Dukes MDHold Reason: Other)    975 mg Oral EVERY 8 HOURS SCHEDULED 11/17/23 1023 11/19/23 1359    11/17/23 0800  polyethylene glycol (MIRALAX) Packet 17 g         17 g Oral 2 TIMES DAILY 11/17/23 0700      11/17/23 0800  senna-docusate (SENOKOT-S/PERICOLACE) 8.6-50 MG per tablet 2 tablet         2 tablet Oral 2 TIMES DAILY 11/17/23 0700      11/17/23 0730  methocarbamol (ROBAXIN) tablet 500 mg         500 mg Oral EVERY 6 HOURS SCHEDULED 11/17/23 0700      11/16/23 2000  ROPivacaine 0.2% in NS perineural infusion (simple)          Perineural Continuous Nerve Block 11/16/23 1944 11/16/23 2000  ROPivacaine 0.2% in NS perineural infusion (simple)          Perineural Continuous Nerve Block 11/16/23 1944 11/16/23 1500  aspirin (ASA) chewable tablet 162 mg         162 mg Oral or NG Tube DAILY 11/16/23 1448      11/16/23 1448  magnesium hydroxide (MILK OF " "MAGNESIA) suspension 30 mL         30 mL Oral DAILY PRN 11/16/23 1448      11/16/23 1448  bisacodyl (DULCOLAX) suppository 10 mg         10 mg Rectal DAILY PRN 11/16/23 1448      11/16/23 1448  HYDROmorphone (DILAUDID) injection 0.2 mg        See Hyperspace for full Linked Orders Report.    0.2 mg Intravenous EVERY 2 HOURS PRN 11/16/23 1448      11/16/23 1448  HYDROmorphone (DILAUDID) injection 0.4 mg        See Hyperspace for full Linked Orders Report.    0.4 mg Intravenous EVERY 2 HOURS PRN 11/16/23 1448      11/16/23 1448  oxyCODONE (ROXICODONE) tablet 5 mg        See Hyperspace for full Linked Orders Report.    5 mg Oral EVERY 4 HOURS PRN 11/16/23 1448      11/16/23 1448  oxyCODONE IR (ROXICODONE) tablet 10 mg        See Hyperspace for full Linked Orders Report.    10 mg Oral EVERY 4 HOURS PRN 11/16/23 1448      11/16/23 1448  hydrOXYzine (ATARAX) tablet 10 mg         10 mg Oral EVERY 6 HOURS PRN 11/16/23 1448      11/16/23 1448  lidocaine 1 % 0.1-1 mL         0.1-1 mL Other EVERY 1 HOUR PRN 11/16/23 1448      11/16/23 1448  lidocaine (LMX4) cream          Topical EVERY 1 HOUR PRN 11/16/23 1448                          Acute Inpatient Pain Service [facility]  Hours of pain coverage 24/7   Page via Amcom- Please Page the Pain Team Via Amcom: \"PAIN MANAGEMENT ACUTE INPATIENT/ [facility]\"            "

## 2023-11-17 NOTE — PLAN OF CARE
Major Shift Events:  Arrived to unit at ~ 1500 from OR; extubated at 1700  Neuro: AxOx4. Able to make needs known. +4 strength. Oxy and dilaudid for pain.  Cards: Sinus rhythm with temp pacer. MAP>65 and SBP<140 w/o interventions. Tmax 38.6.  CI 2.1 CO 4.2 SV 46 SVV 32  Pulm: 2.5L NC after extubation. Good cough. CT x4; dark red output.   GI/: Sullivan patent with good UOP. BG WDL, no insulin needed. No BM.   Plan: Continue to monitor.   For vital signs and complete assessments, please see documentation flowsheets.

## 2023-11-17 NOTE — PROGRESS NOTES
CLINICAL NUTRITION SERVICES - BRIEF NOTE    Received provider consult for nutrition education with comments post op cardiovascular surgery (automatic consult on post-op order set). S/p CABGx2 on 11/16. Nutrition education to be completed as able/appropriate (as pt s/p CABG and/or initial VAD).    RD will follow per LOS protocol or if re-consulted.     Kenzie Xie, MS, RD, LD  4A (CVICU) RD pager: [phone]  Ascom: [id number]  Weekend/Holiday RD pager: [phone]

## 2023-11-17 NOTE — PLAN OF CARE
Goal Outcome Evaluation:    Progress: improving    Major Shift Events:  Neuro intact. Chest/back pain controlled with PRN Oxy 5-10mg & 2x ropivicaine catheters. NSR in 80s. BP at goal. Flotrack q4h. CVP 17-18. Febrile overnight, but normothermic this AM. 2.5LNC. LS clear, dim. UOP 25-35mL/hr - provider notified toward end of shift. Clear liquids overnight. Developing belly distention & bloating; unsuccessful on bedpan. Commode ordered. BG WNL. Chest incision dressings CDI. Chest tube output 10-35mL/hr, increasingly serosang from dark red last PM. Lactic trending down. Wife updated last PM.     Plan: Continue to monitor flotraks. Start therapies and advance diet. Control pain.     For vital signs and complete assessments, please see documentation flowsheets.

## 2023-11-17 NOTE — PROGRESS NOTES
[id number]:     Pt received from [facility] w/ report from Aryan Allan RN.  Neuro: A&Ox4. PERRL Febrile today-team aware, orders to hold tylenol.  Cardiac: SR/ST. Temp Epicardial Pacemaker, capped pacer wires-A&B.   Respiratory: Sating 96% on 2L NC. Using IS. Cough-weak, non-productive.   GI/: Sullivan cath in place for post lasix administration-orders to remove. Commode ordered.  Last BM 11/16. Passing gas.  Diet/appetite: Tolerating reg diet. Eating well.  Activity:  Assist of 2, up to chair and bedside commode.  Pain: Chest tube sites and chest pain: Dilaudid last @ 1850, Oxy last @ 1525.  Skin: 2 person skin check completed with RIRI Ron.  Mediastinal incision- covered-CDI. Rt neck-from internal jugular removal-covered-old drainage, Lt Wrist-from arterial line removal-covered-CDI, Graft sites: Lt leg-CDI & Lt mammary.  LDA's: PIV to Rt hand-SL. Ropivacaine drips x2; both at 7ml/hr bolus. 3 chest tubes- Lt Mediastinal, Rt Pleural, Anterior Mediastinal all to -20 suction 1 atrium.   Endo: BS ACHS.  RN Managed Protocols: Magnesium, Potassium and Phosphorus-not replaced.  Plan: Continue with POC. Notify primary team- CVTS, with changes.

## 2023-11-17 NOTE — PROGRESS NOTES
CV ICU PROGRESS NOTE  November 17, 2023      CO-MORBIDITIES:   Coronary artery disease involving native coronary artery of native heart, unspecified whether angina present  (primary encounter diagnosis)  S/P CABG (coronary artery bypass graft)    ASSESSMENT: Aquiles Mckeon is a 73 year old male with PMH of severe multivessel CAD, prediabetes, HLD who underwent CABG x2, left Internal Mammary Artery [place], Endoscopic [place] of Left Greater Saphenous Vein on 11/16/23 by Dr. Mulvihill.     LAYNE pre-bypass: Normal LV cavity size with mild hypertrophy and perserved EF. Normal RV   size and function. Normal LV diastolic function. PALOMO velocity > 40 cm/s.  No PFO by CFD. Trace TR. Trace MR. Trileaflet aortic valve with  mild-moderate calcification of non-coronary cusp with trivial AI. Pulmonic  valve functionally normal. No pleural or pericardial effusion. No echo evidence of aortic dissection.     LAYNE post-bypass: Global biventricular size and function unchanged on epinephrine infusion. No new RWMA. No new valvulopathies. No new pleural effusion. No echo evidence of aortic dissection.     Last 24 hours:  - Extubated, now on NC    Today's Progress:  - Transfer to floor  - Added metoprolol 12.5mg BID  - Goal net -1L. Ordered furosemide 20mg IV.    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 Notify the MD for any acute changes in exam.  - Pain: paravertebral blocks on ropivacaine. Scheduled tylenol, methocarbamol. PRN tylenol, oxycodone    Plan: D/c lidocaine patch while using paravertebral blocks.     Pulmonary care:   Postoperative ventilation management, extubated 11/16  *PTA Prior smoker  - on nasal cannula  - Titrate supplemental oxygen to maintain saturation above 92%.  - Pulmonary hygiene: Incentive spirometer every 15- 30 minutes when awake, flutter valve, C&DB     Cardiovascular:    S/p CABG x 2 on  11/16/23 by Dr. Mulvihill  History of HTN  Recent echo on 11/16: Global biventricular size and function unchanged on epinephrine infusion. No new RWMA. No new valvulopathies. No new pleural effusion. No echo evidence of aortic dissection.   - Monitor hemodynamic status.   - Goal MAP>65, SBP<140  - ASA, PTA rosuvastatin, metoprolol    Plan: Ordered metoprolol 12.5mg PO BID     GI care/ Nutrition:   - Advance diet as tolerated   - PPI  - Increased bowel regimen: miralax, senna    Renal/ Fluid Balance/ Electrolytes:   BL creat appears to be ~ 0.93  - Strict I/O, daily weights  - Avoid/limit nephrotoxins as able  - Replete lytes PRN per protocol     Endocrine:    Stress induced hyperglycemia  Preop A1c 5.9  - Insulin gtt  - Goal BG <180 for optimal healing     ID/ Antibiotics:  Stress induced leukocytosis  - To complete perioperative regimen  - Continue to monitor fever curve, WBC and inflammatory markers as appropriate     Heme:     Stress induced leukocytosis  Acute blood loss anemia  Acute blood loss thrombocytopenia  No s/sx active bleeding  - Continue to monitor  - CBC      MSK/ Skin:  Sternotomy  Surgical Incision  - Sternal precautions  - Postoperative incision management per protocol  - PT/OT     Prophylaxis:    - Mechanical prophylaxis for DVT  - Chemical DVT prophylaxis - subcutaneous heparin  - PPI     Lines/ tubes/ drains:  - Arterial Line, CTs, RIJ, bradford (Remove today)  - Paravertebral blocks     Disposition:  - Transfer to floor     ICU Checklist  F - Feeding: Advance diet as tolerated  A - Analgesia: Paravertebral blocks  S - Sedation: N/A  T - Thromboembolic prophylaxis: Heparin subcutaneous      H - Head of bed elevated  U - Ulcer prophylaxis: Pantoprazole  G - Glycemic control: SSI  S - SBT: N/A     B - Bowel regimen: Miralax, senna-docusate  I - Indwelling catheter: [place] (Remove today)  D - De-escalation of antibiotics: Perioperative abx regimen        Patient seen, findings and plan discussed with  CVICU staff     Luann Dukes MD  Anesthesiology CA-1/PGY-2  ====================================    HPI:   Aquiles Mckeon is a 73 year old male with PMH of severe multivessel CAD, prediabetes, HLD who underwent CABG x2 on 11/16/23 by Dr. Mulvihill.  11/17: Saturating adequately on room air. Resting comfortably in chair, tolerating diet.    OBJECTIVE:   1. VITAL SIGNS:   Temp:  [99.1  F (37.3  C)-101.5  F (38.6  C)] 99.5  F (37.5  C)  Pulse:  [] 83  Resp:  [11-24] 14  BP: (106-121)/(75-99) 120/91  MAP:  [68 mmHg-93 mmHg] 75 mmHg  Arterial Line BP: ()/(55-74) 111/60  FiO2 (%):  [40 %-70 %] 40 %  SpO2:  [91 %-98 %] 97 %  Vent Mode: (S) CPAP/PS  (Continuous positive airway pressure with Pressure Support)  FiO2 (%): 40 %  Resp Rate (Set): 18 breaths/min  Tidal Volume (Set, mL): 460 mL  PEEP (cm H2O): 5 cmH2O  Pressure Support (cm H2O): 7 cmH2O  Resp: 14      2. INTAKE/ OUTPUT:   I/O last 3 completed shifts:  In: 5953 [I.V.:4613; Other:340; IV Piggyback:1000]  Out: 3055 [Urine:1725; Blood:1000; Chest Tube:330]    3. PHYSICAL EXAMINATION:   General: NAD, sitting comfortably in chair  Neuro: A&O x4  Resp: CTAB, on RA  CV: S1, S2, RRR, no m/r/g   Abdomen: Soft, distended, non-tender  Incisions: c/d/i  Extremities: warm and well perfused  CT: To suction, serosang output, no airleak    4. INVESTIGATIONS:   Arterial Blood Gases   Recent Labs   Lab 11/17/23  0428 11/16/23  1959 11/16/23  1842 11/16/23  1656   PH 7.40 7.41 7.39 7.34*   PCO2 43 40 32* 33*   PO2 94 79* 90 100   HCO3 27 25 19* 18*     Complete Blood Count   Recent Labs   Lab 11/17/23  0429 11/16/23  1459 11/16/23  1335 11/16/23  1313 11/16/23  1305   WBC 12.7* 20.1*  --   --   --    HGB 10.9* 10.9* 8.9*  --  9.8*   * 140*  --  134*  --      Basic Metabolic Panel  Recent Labs   Lab 11/17/23  0437 11/17/23  0429 11/17/23  0031 11/16/23  2239 11/16/23  1551 11/16/23  1459 11/16/23  1458 11/16/23  1335 11/16/23  1305   NA  --  137  --   --    --  141  --  140 139   POTASSIUM  --  4.7  --   --   --  4.7  --  3.6 4.4   CHLORIDE  --  104  --   --   --  105  --   --   --    CO2  --  25  --   --   --  24  --   --   --    BUN  --  19.5  --   --   --  13.5  --   --   --    CR  --  1.16  --   --   --  0.88  --   --   --    * 113* 102* 121*   < > 132*   < > 128* 159*    < > = values in this interval not displayed.     Liver Function Tests  Recent Labs   Lab 11/17/23  0429 11/16/23  1459 11/16/23  1313   AST 61* 39  --    ALT 34 28  --    ALKPHOS 42 41  --    BILITOTAL 0.8 0.6  --    ALBUMIN 3.3* 3.0*  --    INR  --  1.44* 1.68*     Pancreatic Enzymes  No lab results found in last 7 days.  Coagulation Profile  Recent Labs   Lab 11/16/23  1459 11/16/23  1313   INR 1.44* 1.68*   PTT 25 29         5. RADIOLOGY:   Recent Results (from the past 24 hour(s))   POC US Guidance Needle Placement    Impression    Bilateral paravertebral catheter placement   LAYNE with Report    Narrative    Charbel Ely DO     11/16/2023  1:45 PM  Perioperative LAYNE Procedure Note    Staff -        Anesthesiologist:  Rafael Raman MD       Resident/Fellow: Charbel Ely DO       Performed By: fellow  Preanesthesia Checklist:  Patient identified, IV assessed, risks and   benefits discussed, monitors and equipment assessed, procedure being   performed at surgeon's request and anesthesia consent obtained.    LAYNE Probe Insertion    Probe Status PRE Insertion: NO obvious damage  Probe type:  Adult 3D  Bite block used:   Soft  Insertion Technique: Jaw Lift  Insertion complications: None obvious  Billing Report:LAYNE report by Anesthesiologist (See Separate Report note)  Probe Status POST Removal: NO obvious damage    LAYNE Report  General Procedure Information  Images for this study have been archived.  Modalities: Color flow mapping, CW Doppler, PW Doppler, 3D and 2D  Echocardiographic and Doppler Measurements  Right Ventricle:  Cavity size normal.    Hypertrophy not present.     Thrombus  not present.    Global function normal.     Left Ventricle:    Hypertrophy present.   Thrombus not present.   Global   Function normal.       Ventricular Regional Function:  1- Basal Anteroseptal:  normal  2- Basal Anterior:  normal  3- Basal Anterolateral:  normal  4- Basal Inferolateral:  normal  5- Basal Inferior:  normal  6- Basal Inferoseptal:  normal  7- Mid Anteroseptal:  normal  8- Mid Anterior:  normal  9- Mid Anterolateral:  normal  10- Mid Inferolateral:  normal  11- Mid Inferior:  normal  12- Mid Inferoseptal:  normal  13- Apical Anterior:  normal  14- Apical Lateral:  normal  15- Apical Inferior:  normal  16- Apical Septal:  normal  17- [place]:  normal    Valves  Aortic Valve: Annulus calcified.  Stenosis not present.  Regurgitation   absent.  Leaflets normal.  Leaflet motions normal.    Mitral Valve: Annulus normal.  Stenosis not present.  Regurgitation +1.    Leaflets normal.  Leaflet motions normal.    Tricuspid Valve: Annulus normal.  Stenosis not present.  Regurgitation +1.    Leaflets normal.  Leaflet motions normal.    Pulmonic Valve: Annulus normal.      Aorta: Ascending Aorta: Size normal.  Dissection not present.    Aortic Arch: Size normal.    Dissection not present.      Descending Aorta: Size normal.          Right Atrium:  Size normal.   Spontaneous echo contrast not present.     Thrombus not present.   Tumor not present.   Device not present.     Left Atrium: Size normal.  Spontaneous echo contrast not present.    Thrombus not present.  Tumor not present.  Device not present.    Left atrial appendage normal.     Atrial Septum: Intra-atrial septal morphology normal.       Ventricular Septum: Intra-ventricular septum morphology normal.        Other Findings:   Pericardium:  normal. Pleural Effusion:  none. Pulmonary Arteries:    normal. Pulmonary Venous Flow:  normal. No coronary sinus catheter   present.    Post Intervention Findings  Procedure(s) performed:  CABG. Global function:   Unchanged. Regional wall   motion: Unchanged. Surgeon(s) notified of all postintervention findings:   Yes.                 Echocardiogram Comments  Echocardiogram comments: Pre-CPB:  Normal LV cavity size with mild hypertrophy and perserved EF. Normal RV   size and function. Normal LV diastolic function. PALOMO velocity > 40 cm/s.   No PFO by CFD. Trace TR. Trace MR. Trileaflet aortic valve with   mild-moderate calcification of non-coronary cusp with trivial AI. Pulmonic   valve functionally normal. No pleural or pericardial effusion. No echo   evidence of aortic dissection. Findings communicated with surgical team.    Post-CP:  Global biventricular size and function unchanged on epinephrine infusion.   No new RWMA. No new valvulopathies. No new pleural effusion. No echo   evidence of aortic dissection. Findings communicated with surgical team..   XR Chest Port 1 View    Narrative    Examination: XR CHEST PORT 1 VIEW 11/16/2023 4:02 PM    Indication: Post Op CVTS Surgery    Comparison: X-ray 11/14/2023 and CT 11/2/2023.    Findings:  AP portable chest. Post surgical changes of CABG. Endotracheal tube  terminates over the upper/mid thoracic trachea. Intact median  sternotomy cerclage wires. Right IJ catheter/sheath over the  confluence of innominate veins. Left lower chest tube in place.  Mediastinal surgical drain. Trachea is midline. Cardiac silhouette is  within normal limits. No significant pleural effusion or discernible  pneumothorax. Mild/moderate perihilar and retrocardiac/basilar edema  and atelectasis. No focal lung consolidation. Unremarkable upper  abdomen. No acute osseous abnormality.      Impression    Impression:   1. Postoperative changes of CABG with mild vascular congestion and  atelectasis. No pneumothorax.  2. Support devices as above..    I have personally reviewed the examination and initial interpretation  and I agree with the findings.    GRETTA BELLAMY DO         SYSTEM ID:  [id number]   XR Chest  Port 1 View    Impression    RESIDENT PRELIMINARY INTERPRETATION  Impression:   1. Mild perihilar bibasilar opacities are stable.  2. Interval removal of endotracheal tube. Otherwise stable support  devices.       =========================================

## 2023-11-17 NOTE — PLAN OF CARE
Transferred to: Unit 6C  at (time)  Belongings:   [place] removed? No: Actively diuresing  Central line removed? Yes  Chart and medications sent with patient Yes  Family notified: Yes    Major Shift Events: Pt c/o sudden stabbing pain around L ropivicaine catheter site. Pain team called to assess, no intervention needed. Resolved w/10 mg PRN oxycodone. Pt up to chair w/PT, stand to sit multiple times. Symptomatic orthostatic hypotension on last stand, self-resolved. Lasix 20 mg x1 40 mg x2 w/ moderate response. De-lined. C/o increased incisional pain. Oxy 10 mg, dilaudid 0.4 mg IV w/partial relief. Educated on IS and proper splinting technique. Tolerating reg diet still no BM. Continued stable off pressors, 2L NC. Tx to 6C.      Goal Outcome Evaluation:

## 2023-11-17 NOTE — BRIEF OP NOTE
[facility]    Brief Operative Note    Pre-operative diagnosis: CAD (coronary artery disease) [I25.10]  Post-operative diagnosis Same as pre-operative diagnosis    Procedure: Median Sternotomy, Cardiopulmonary Bypass, Intraoperative Transesophageal Echocardiogram, Coronary Artery Bypass Graft x 2, Left Internal Mammary Artery [place], Endoscopic [place] of Left Greater Saphenous Vein, Intraoperative Epi-Aortic Untrasound, N/A - Chest  CABG x2 (LIMA to LAD, SVG to OM)      Surgeon: Surgeon(s) and Role:     * Mulvihill, Michael, MD - Primary     * Vandana Lucas PA-C  Anesthesia: General   Estimated Blood Loss: 1000 mL from 11/16/2023  7:35 AM to 11/16/2023  2:43 PM      Drains:  2 x mediastinal, 1 x left pleural  Specimens: * No specimens in log *  Findings:   None.  Complications: None.  Implants: * No implants in log *

## 2023-11-17 NOTE — PROGRESS NOTES
"   11/17/23 0942   Appointment Info   Signing Clinician's Name / Credentials (OT) OCTAVIO Mueller   Student Supervision Direct supervision provided   Rehab Comments (OT) sternal prec   Living Environment   People in Home spouse   Current Living Arrangements house   Home Accessibility stairs within home;stairs to enter home   Number of Stairs, Main Entrance 1   Stair Railings, Main Entrance railings safe and in good condition   Number of Stairs, Within Home, Primary greater than 10 stairs   Stair Railings, Within Home, Primary railing on right side (ascending)   Transportation Anticipated family or friend will provide   Living Environment Comments Pt lives with spouse in multi-level home. Has tub/shower and multiple stairs within the home.   Self-Care   Usual Activity Tolerance moderate   Current Activity Tolerance fair   Regular Exercise No   Equipment Currently Used at Home none;cane, straight;walker, rolling   Fall history within last six months no   Activity/Exercise/Self-Care Comment Pt reports IND at baseline with I/ADLs. Has cane/walker at home due to prior knee surgery, but does not currently ambulate with any adaptive equipment prior to hospitalization.   Instrumental Activities of Daily Living (IADL)   Previous Responsibilities housekeeping;medication management;driving;yardwork   IADL Comments Pt reports he will have help from the neighbors with yardwork; wife completes other I/ADLs.   General Information   Onset of Illness/Injury or Date of Surgery 11/16/23   Referring Physician Vandana Lucas, JACQUES   Patient/Family Therapy Goal Statement (OT) \"return back to home\"   Additional Occupational Profile Info/Pertinent History of Current Problem \" Aquiles Mckeon is a 73 year old male with PMH of severe multivessel CAD, prediabetes, HLD who underwent CABG x2 on 11/16/23 by Dr. Mulvihill.\"   Existing Precautions/Restrictions cardiac;fall;sternal   Left Upper Extremity (Weight-bearing Status) partial " weight-bearing (PWB)   Right Upper Extremity (Weight-bearing Status) partial weight-bearing (PWB)   Left Lower Extremity (Weight-bearing Status) full weight-bearing (FWB)   Right Lower Extremity (Weight-bearing Status) full weight-bearing (FWB)   Cognitive Status Examination   Orientation Status orientation to person, place and time   Visual Perception   Visual Impairment/Limitations corrective lenses for reading   Sensory   Sensory Quick Adds sensation intact   Pain Assessment   Patient Currently in Pain Yes, see Vital Sign flowsheet   Posture   Posture not impaired   Range of Motion Comprehensive   Comment, General Range of Motion FILI 2/2 sternal precautions   Strength Comprehensive (MMT)   Comment, General Manual Muscle Testing (MMT) Assessment FILI 2/2 sternal precautions   Coordination   Gross Motor Coordination FILI   Fine Motor Coordination FILI   Bed Mobility   Bed Mobility supine-sit   Supine-Sit [place] (Bed Mobility) moderate assist (50% patient effort)   Transfers   Transfers sit-stand transfer   Sit-Stand Transfer   Sit-Stand [place] (Transfers) contact guard   Activities of Daily Living   BADL Assessment/Intervention upper body dressing;bathing;lower body dressing;toileting   Bathing Assessment/Intervention   Comment, (Bathing) per clinical judgement: mod A   Upper Body Dressing Assessment/Training   Comment, (Upper Body Dressing) per clinical judgement :min A   Lower Body Dressing Assessment/Training   Comment, (Lower Body Dressing) per clinical judgement: min A   Toileting   Comment, (Toileting) per clinical judgement: mod A   Clinical Impression   Criteria for Skilled Therapeutic Interventions Met (OT) Yes, treatment indicated   OT Diagnosis Decreased ADL/IADL I   OT Problem List-Impairments impacting ADL problems related to;strength;pain   Identified Performance Deficits Dressing, bathing, toileting, g/h, home mgmt   Planned Therapy Interventions (OT) ADL retraining;IADL retraining;home  program guidelines;progressive activity/exercise;risk factor education   Clinical Decision Making Complexity (OT) detailed assessment/moderate complexity   Risk & Benefits of therapy have been explained evaluation/treatment results reviewed;care plan/treatment goals reviewed;risks/benefits reviewed;current/potential barriers reviewed;participants voiced agreement with care plan;participants included;patient   Clinical Impression Comments Pt willl benefit from skilled OT services to progress IND w/ ADLs/IADLs and facilitate return to PLOF.   OT Total Evaluation Time   OT Eval, Low Complexity Minutes (77603) 9   OT Goals   Therapy Frequency (OT) 6 times/week   OT Predicted Duration/Target Date for Goal Attainment 12/01/23   OT: Upper Body Dressing Modified independent;Independent   OT: Lower Body Dressing Modified independent;Independent   OT: Upper Body Bathing Modified independent;Independent   OT: Lower Body Bathing Modified independent;Independent   OT: Bed Mobility Modified independent   OT: Toilet Transfer/Toileting Modified independent;Independent   OT: Understanding of cardiac education to maximize quality of life, condition management, and health outcomes Patient;Verbalize   OT: Perform aerobic activity with stable cardiovascular response 10 minutes   OT: Functional/aerobic ambulation tolerance with stable cardiovascular response in order to return to home and community environment Modified independent   OT: Navigation of stairs simulating home set up with stable cardiovascular response in order to return to home and community environment Modified independent;Independent   Therapeutic Activities   Therapeutic Activity Minutes (60491) 39   Symptoms noted during/after treatment dizziness;fatigue;significant change in vital signs   Treatment Detail/Skilled Intervention OT: Facilitated functional transfers to promote IND with I/ADLs. Completed evaluation, treatment initiated. Pt greeted in supine; pleasant and  agreeable to therapy. Extra time required for line management, setup environment, and management of vitals after activity for safety of pt. Pt educated on sternal precautions and log roll technique for bed mobility and required min A rolling to right side. Completed sit<>supine mod A, extra time required for lines. Mod A for trunk control to scoot toward EOB. Pt educated on sternal precautions; verbalized understanding. Completed STS from EOB CGA x 2; able to demonstrate standing marches and progressed with pivot transfer close CGA x 2. Extra time required monitoring BP and setup environment. Pt completed additional STS from recliner CGA x 2 and able to perform standing marches; returned back to recliner with lightheadness; BP hypotensive, MAP dipping into high 40's. Pt placed in recliner and laid to supine, BP slowly increasing and returning back to normal; RN notified and present. th provided wet washcloth to pt to improve symptoms. Educated pt to monitor his symptoms with activity and let th know when feeling nausea, dizziness, fatigue, etc to perform safe activities; pt verbalized understanding. Pt left in recliner, BP normal, with all needs met and call light within reach. Nursing aware of hypotensive BP with activity.   OT Discharge Planning   OT Plan OT: screen for PT needs, review sternal precautions, bed mobility, STS, pivot transfer   OT Discharge Recommendation (DC Rec) home with outpatient cardiac rehab;home with assist   OT Rationale for DC Rec Pt significantly below baseline PLOF limited by sternal precautions, activity tolerance and endurance. Limited this date by symptomatic hypotension. Pending IP rehab progression, anticipate likely d/c home with OP CR and home with assistance from spouse. Will update recs as indicated.   OT Brief overview of current status mod A bed mobility (line management), CGA x 2 STS, CGA A x 2 pivot transfer   Total Session Time   Timed Code Treatment Minutes 39   Total  Session Time (sum of timed and untimed services) 48

## 2023-11-17 NOTE — PROGRESS NOTES
Cardiovascular Surgery Progress Note  11/17/2023         Assessment and Plan:     Aquiles Mckeon is a 73 year old male with PMH of severe multivessel CAD, prediabetes, HLD who underwent CABG x2, left Internal Mammary Artery [place], Endoscopic [place] of Left Greater Saphenous Vein on 11/16/23 by Dr. Mulvihill.     Cardiovascular:   Severe multivessel CAD s/p CABG x2  HLD/HTN  No arrhythmias overnight, HD stable, in NSR.  Most recent echo demonstrated preserved biventricular function  -  mg daily  - Rosuvastatin 40 mg daily  - BB: metoprolol tartrate 12.5 mg BID  - Diuresis as below  Chest tubes:  in place with 460 mL ssang output in 24h, leave in to suction  TPW: cap    Pulmonary:  Previous smoker  Extubated POD 0 to NC. Now saturating well on RA   - Pulm hygiene, IS, activity and deep breathing    Neurology / MSK:  Acute post-operative pain  Hx Bell's Palsy residual left sided heaviness  Pain well controlled with current regimen:  - Acetaminophen, PO oxycodone PRN, , methocarbamol  - Pain team following, appreciate     / Renal / Fluid / Electrolytes:  BL creat ~ 0.9, most recent creatinine 1.31; adequate UOP.   FLUID STATUS: Pre-op weight 202 lbs, weight today 212 lbs ; I/O past 24 hours: -47 mL  - Diuresis: lasix 20 mg IV x1 and evaluate response  - Replete lytes per protocol  - Strict I/O, daily weights  - Avoid/limit nephrotoxins as able    GI / Nutrition:   - Tolerating regular diet  - Continue bowel regimen, no BM yet    Endocrine:  Stress induced hyperglycemia  Prediabetes   Hgb A1c 5.9%  - Initially managed on insulin drip postop, transitioned to sliding scale; goal BG <180 for optimal healing    Infectious Disease:  Stress induced leukocytosis  WBC 17.6, tmax 101.8, no signs or symptoms of infection  - Completed perioperative antibiotics  - Continue to monitor fever curve, CBC    Hematology:   Acute blood loss anemia and thrombocytopenia  Hgb 11.6; Plt 123K, no signs or symptoms of active  bleeding  - CBC in AM    Anticoagulation:   - ASA only    MSK/Skin:  Sternotomy  Surgical incision  - Sternal precautions  - Incisional cares per protocol    Prophylaxis:   - Stress ulcer prophylaxis: Pantoprazole 40 mg daily  - DVT prophylaxis: Subcutaneous heparin, SCD    Disposition:   - Transferred to  on 11/17  - Therapies recommending discharge to home with OP CR when medically ready    Dr. Mulvihill has been informed of changes through both verbal and written communication.      Juliet Mulligan APRN, ACNPC-AG, CCRN  Nurse Practitioner  Cardiothoracic Surgery  Pager: [phone]        Interval History:     No overnight events.  States pain is well managed on current regimen. Slept well overnight.  Tolerating diet, is passing flatus, + BM. No nausea or vomiting.  Breathing well without complaints.   Working with therapies and ambulating in halls with assistance.   Denies chest pain, palpitations, dizziness, syncopal symptoms, fevers, chills, myalgias, or sternal popping/clicking.         Physical Exam:     Gen: A&Ox4, NAD  Neuro: Intact with no focal deficits   CV: RRR, normal S1 S2, no murmurs, rubs or gallops. no JVD  Pulm: CTA, no wheezing or rhonchi, normal breathing on RA  Abd: nondistended, normal BS, soft, nontender  Ext: no peripheral edema, no pitting  Incision: clean, dry, intact, no erythema, sternum stable  Tubes/drain sites: dressing clean and dry, serosanguinous output, no air leak. 24 hr output 460 mL.          Data:    Imaging:  reviewed recent imaging, no acute concerns    Recent Results (from the past 24 hour(s))   XR Chest Port 1 View    Narrative    Examination: XR CHEST PORT 1 VIEW 11/16/2023 4:02 PM    Indication: Post Op CVTS Surgery    Comparison: X-ray 11/14/2023 and CT 11/2/2023.    Findings:  AP portable chest. Post surgical changes of CABG. Endotracheal tube  terminates over the upper/mid thoracic trachea. Intact median  sternotomy cerclage wires. Right IJ catheter/sheath over  the  confluence of innominate veins. Left lower chest tube in place.  Mediastinal surgical drain. Trachea is midline. Cardiac silhouette is  within normal limits. No significant pleural effusion or discernible  pneumothorax. Mild/moderate perihilar and retrocardiac/basilar edema  and atelectasis. No focal lung consolidation. Unremarkable upper  abdomen. No acute osseous abnormality.      Impression    Impression:   1. Postoperative changes of CABG with mild vascular congestion and  atelectasis. No pneumothorax.  2. Support devices as above..    I have personally reviewed the examination and initial interpretation  and I agree with the findings.    GRETTA BELLAMY DO         SYSTEM ID:  [id number]   XR Chest Port 1 View    Impression    RESIDENT PRELIMINARY INTERPRETATION  Impression:   1. Mild perihilar bibasilar opacities are stable.  2. Interval removal of endotracheal tube. Otherwise stable support  devices.        Labs:  Recent Labs   Lab 11/17/23  0437 11/17/23  0429 11/17/23  0031 11/16/23  1551 11/16/23  1459 11/16/23  1458 11/16/23  1335 11/16/23  1313   WBC  --  12.7*  --   --  20.1*  --   --   --    HGB  --  10.9*  --   --  10.9*  --  8.9*  --    MCV  --  90  --   --  93  --   --   --    PLT  --  127*  --   --  140*  --   --  134*   INR  --   --   --   --  1.44*  --   --  1.68*   NA  --  137  --   --  141  --  140  --    POTASSIUM  --  4.7  --   --  4.7  --  3.6  --    CHLORIDE  --  104  --   --  105  --   --   --    CO2  --  25  --   --  24  --   --   --    BUN  --  19.5  --   --  13.5  --   --   --    CR  --  1.16  --   --  0.88  --   --   --    ANIONGAP  --  8  --   --  12  --   --   --    DANICA  --  8.2*  --   --  8.2*  --   --   --    * 113* 102*   < > 132*   < > 128*  --    ALBUMIN  --  3.3*  --   --  3.0*  --   --   --    PROTTOTAL  --  5.1*  --   --  4.5*  --   --   --    BILITOTAL  --  0.8  --   --  0.6  --   --   --    ALKPHOS  --  42  --   --  41  --   --   --    ALT  --  34  --   --  28  --    --   --    AST  --  61*  --   --  39  --   --   --     < > = values in this interval not displayed.      GLUCOSE:   Recent Labs   Lab 11/17/23  0437 11/17/23  0429 11/17/23  0031 11/16/23  2239 11/16/23 2001 11/16/23  1656   * 113* 102* 121* 123* 116*

## 2023-11-18 ENCOUNTER — APPOINTMENT (OUTPATIENT)
Dept: OCCUPATIONAL THERAPY | Facility: CLINIC | Age: 73
DRG: 236 | End: 2023-11-18
Attending: STUDENT IN AN ORGANIZED HEALTH CARE EDUCATION/TRAINING PROGRAM
Payer: COMMERCIAL

## 2023-11-18 LAB
ALBUMIN SERPL BCG-MCNC: 3.3 G/DL (ref 3.5–5.2)
ALP SERPL-CCNC: 44 U/L (ref 40–150)
ALT SERPL W P-5'-P-CCNC: 32 U/L (ref 0–70)
ANION GAP SERPL CALCULATED.3IONS-SCNC: 9 MMOL/L (ref 7–15)
AST SERPL W P-5'-P-CCNC: 61 U/L (ref 0–45)
BILIRUB SERPL-MCNC: 0.7 MG/DL
BUN SERPL-MCNC: 25.1 MG/DL (ref 8–23)
CA-I BLD-MCNC: 4.5 MG/DL (ref 4.4–5.2)
CALCIUM SERPL-MCNC: 8.8 MG/DL (ref 8.8–10.2)
CHLORIDE SERPL-SCNC: 100 MMOL/L (ref 98–107)
CREAT SERPL-MCNC: 1.31 MG/DL (ref 0.67–1.17)
DEPRECATED HCO3 PLAS-SCNC: 26 MMOL/L (ref 22–29)
EGFRCR SERPLBLD CKD-EPI 2021: 58 ML/MIN/1.73M2
ERYTHROCYTE [DISTWIDTH] IN BLOOD BY AUTOMATED COUNT: 13.2 % (ref 10–15)
GLUCOSE BLDC GLUCOMTR-MCNC: 123 MG/DL (ref 70–99)
GLUCOSE BLDC GLUCOMTR-MCNC: 125 MG/DL (ref 70–99)
GLUCOSE BLDC GLUCOMTR-MCNC: 126 MG/DL (ref 70–99)
GLUCOSE BLDC GLUCOMTR-MCNC: 134 MG/DL (ref 70–99)
GLUCOSE BLDC GLUCOMTR-MCNC: 143 MG/DL (ref 70–99)
GLUCOSE SERPL-MCNC: 129 MG/DL (ref 70–99)
HCT VFR BLD AUTO: 35.8 % (ref 40–53)
HGB BLD-MCNC: 11.6 G/DL (ref 13.3–17.7)
LACTATE SERPL-SCNC: 1.8 MMOL/L (ref 0.7–2)
MAGNESIUM SERPL-MCNC: 2.1 MG/DL (ref 1.7–2.3)
MCH RBC QN AUTO: 31.2 PG (ref 26.5–33)
MCHC RBC AUTO-ENTMCNC: 32.4 G/DL (ref 31.5–36.5)
MCV RBC AUTO: 96 FL (ref 78–100)
PHOSPHATE SERPL-MCNC: 4.4 MG/DL (ref 2.5–4.5)
PLATELET # BLD AUTO: 123 10E3/UL (ref 150–450)
POTASSIUM SERPL-SCNC: 5.3 MMOL/L (ref 3.4–5.3)
PROT SERPL-MCNC: 5.8 G/DL (ref 6.4–8.3)
RBC # BLD AUTO: 3.72 10E6/UL (ref 4.4–5.9)
SODIUM SERPL-SCNC: 135 MMOL/L (ref 135–145)
WBC # BLD AUTO: 17.6 10E3/UL (ref 4–11)

## 2023-11-18 PROCEDURE — 83735 ASSAY OF MAGNESIUM: CPT | Performed by: STUDENT IN AN ORGANIZED HEALTH CARE EDUCATION/TRAINING PROGRAM

## 2023-11-18 PROCEDURE — 250N000013 HC RX MED GY IP 250 OP 250 PS 637: Performed by: PHYSICIAN ASSISTANT

## 2023-11-18 PROCEDURE — 97530 THERAPEUTIC ACTIVITIES: CPT | Mod: GO

## 2023-11-18 PROCEDURE — 999N000128 HC STATISTIC PERIPHERAL IV START W/O US GUIDANCE

## 2023-11-18 PROCEDURE — 85027 COMPLETE CBC AUTOMATED: CPT

## 2023-11-18 PROCEDURE — 250N000011 HC RX IP 250 OP 636: Mod: JZ | Performed by: PHYSICIAN ASSISTANT

## 2023-11-18 PROCEDURE — 271N000002 HC RX 271: Performed by: STUDENT IN AN ORGANIZED HEALTH CARE EDUCATION/TRAINING PROGRAM

## 2023-11-18 PROCEDURE — 250N000011 HC RX IP 250 OP 636: Mod: JZ | Performed by: NURSE PRACTITIONER

## 2023-11-18 PROCEDURE — 250N000013 HC RX MED GY IP 250 OP 250 PS 637

## 2023-11-18 PROCEDURE — 82330 ASSAY OF CALCIUM: CPT | Performed by: PHYSICIAN ASSISTANT

## 2023-11-18 PROCEDURE — 97535 SELF CARE MNGMENT TRAINING: CPT | Mod: GO

## 2023-11-18 PROCEDURE — 250N000013 HC RX MED GY IP 250 OP 250 PS 637: Performed by: NURSE PRACTITIONER

## 2023-11-18 PROCEDURE — 84100 ASSAY OF PHOSPHORUS: CPT | Performed by: STUDENT IN AN ORGANIZED HEALTH CARE EDUCATION/TRAINING PROGRAM

## 2023-11-18 PROCEDURE — 250N000011 HC RX IP 250 OP 636: Performed by: STUDENT IN AN ORGANIZED HEALTH CARE EDUCATION/TRAINING PROGRAM

## 2023-11-18 PROCEDURE — 36415 COLL VENOUS BLD VENIPUNCTURE: CPT | Performed by: PHYSICIAN ASSISTANT

## 2023-11-18 PROCEDURE — 99232 SBSQ HOSP IP/OBS MODERATE 35: CPT | Mod: GC | Performed by: ANESTHESIOLOGY

## 2023-11-18 PROCEDURE — 120N000003 HC R&B IMCU UMMC

## 2023-11-18 PROCEDURE — 83605 ASSAY OF LACTIC ACID: CPT

## 2023-11-18 PROCEDURE — 80053 COMPREHEN METABOLIC PANEL: CPT

## 2023-11-18 RX ORDER — SIMETHICONE 80 MG
80 TABLET,CHEWABLE ORAL 4 TIMES DAILY
Status: DISCONTINUED | OUTPATIENT
Start: 2023-11-18 | End: 2023-11-21 | Stop reason: HOSPADM

## 2023-11-18 RX ORDER — GUAIFENESIN 600 MG/1
600 TABLET, EXTENDED RELEASE ORAL 2 TIMES DAILY
Status: DISCONTINUED | OUTPATIENT
Start: 2023-11-18 | End: 2023-11-21 | Stop reason: HOSPADM

## 2023-11-18 RX ORDER — FUROSEMIDE 10 MG/ML
20 INJECTION INTRAMUSCULAR; INTRAVENOUS ONCE
Status: COMPLETED | OUTPATIENT
Start: 2023-11-18 | End: 2023-11-18

## 2023-11-18 RX ORDER — IPRATROPIUM BROMIDE AND ALBUTEROL SULFATE 2.5; .5 MG/3ML; MG/3ML
3 SOLUTION RESPIRATORY (INHALATION) 4 TIMES DAILY PRN
Status: DISCONTINUED | OUTPATIENT
Start: 2023-11-18 | End: 2023-11-21 | Stop reason: HOSPADM

## 2023-11-18 RX ORDER — GUAIFENESIN 200 MG/10ML
200 LIQUID ORAL EVERY 4 HOURS PRN
Status: DISCONTINUED | OUTPATIENT
Start: 2023-11-18 | End: 2023-11-21 | Stop reason: HOSPADM

## 2023-11-18 RX ADMIN — ACETAMINOPHEN 975 MG: 325 TABLET, FILM COATED ORAL at 15:16

## 2023-11-18 RX ADMIN — FUROSEMIDE 20 MG: 10 INJECTION, SOLUTION INTRAMUSCULAR; INTRAVENOUS at 11:02

## 2023-11-18 RX ADMIN — PANTOPRAZOLE SODIUM 40 MG: 40 TABLET, DELAYED RELEASE ORAL at 08:33

## 2023-11-18 RX ADMIN — HYDROMORPHONE HYDROCHLORIDE 0.2 MG: 0.2 INJECTION, SOLUTION INTRAMUSCULAR; INTRAVENOUS; SUBCUTANEOUS at 03:04

## 2023-11-18 RX ADMIN — OXYCODONE HYDROCHLORIDE 10 MG: 10 TABLET ORAL at 23:27

## 2023-11-18 RX ADMIN — SENNOSIDES AND DOCUSATE SODIUM 2 TABLET: 50; 8.6 TABLET ORAL at 08:34

## 2023-11-18 RX ADMIN — Medication 5 MG: at 20:14

## 2023-11-18 RX ADMIN — METHOCARBAMOL TABLETS 750 MG: 750 TABLET, COATED ORAL at 23:40

## 2023-11-18 RX ADMIN — ASPIRIN 81 MG CHEWABLE TABLET 162 MG: 81 TABLET CHEWABLE at 08:31

## 2023-11-18 RX ADMIN — METHOCARBAMOL TABLETS 750 MG: 750 TABLET, COATED ORAL at 11:01

## 2023-11-18 RX ADMIN — METHOCARBAMOL TABLETS 750 MG: 750 TABLET, COATED ORAL at 08:32

## 2023-11-18 RX ADMIN — OXYCODONE HYDROCHLORIDE 10 MG: 10 TABLET ORAL at 00:53

## 2023-11-18 RX ADMIN — Medication 12.5 MG: at 20:14

## 2023-11-18 RX ADMIN — OXYCODONE HYDROCHLORIDE 10 MG: 10 TABLET ORAL at 06:59

## 2023-11-18 RX ADMIN — OXYCODONE HYDROCHLORIDE 10 MG: 10 TABLET ORAL at 15:18

## 2023-11-18 RX ADMIN — Medication 500 MG: at 00:09

## 2023-11-18 RX ADMIN — ROSUVASTATIN CALCIUM 40 MG: 20 TABLET, FILM COATED ORAL at 08:33

## 2023-11-18 RX ADMIN — OXYCODONE HYDROCHLORIDE 10 MG: 10 TABLET ORAL at 19:17

## 2023-11-18 RX ADMIN — HEPARIN SODIUM 5000 UNITS: 5000 INJECTION, SOLUTION INTRAVENOUS; SUBCUTANEOUS at 23:28

## 2023-11-18 RX ADMIN — SIMETHICONE 80 MG: 80 TABLET, CHEWABLE ORAL at 20:14

## 2023-11-18 RX ADMIN — GUAIFENESIN 600 MG: 600 TABLET ORAL at 20:14

## 2023-11-18 RX ADMIN — SIMETHICONE 80 MG: 80 TABLET, CHEWABLE ORAL at 15:18

## 2023-11-18 RX ADMIN — HEPARIN SODIUM 5000 UNITS: 5000 INJECTION, SOLUTION INTRAVENOUS; SUBCUTANEOUS at 08:33

## 2023-11-18 RX ADMIN — HEPARIN SODIUM 5000 UNITS: 5000 INJECTION, SOLUTION INTRAVENOUS; SUBCUTANEOUS at 15:18

## 2023-11-18 RX ADMIN — METHOCARBAMOL TABLETS 750 MG: 750 TABLET, COATED ORAL at 19:17

## 2023-11-18 RX ADMIN — POLYETHYLENE GLYCOL 3350 17 G: 17 POWDER, FOR SOLUTION ORAL at 08:34

## 2023-11-18 RX ADMIN — SIMETHICONE 80 MG: 80 TABLET, CHEWABLE ORAL at 11:01

## 2023-11-18 RX ADMIN — ACETAMINOPHEN 975 MG: 325 TABLET, FILM COATED ORAL at 23:27

## 2023-11-18 RX ADMIN — TAMSULOSIN HYDROCHLORIDE 0.4 MG: 0.4 CAPSULE ORAL at 08:33

## 2023-11-18 RX ADMIN — GUAIFENESIN 600 MG: 600 TABLET ORAL at 11:01

## 2023-11-18 RX ADMIN — Medication 12.5 MG: at 08:33

## 2023-11-18 RX ADMIN — OXYCODONE HYDROCHLORIDE 10 MG: 10 TABLET ORAL at 10:59

## 2023-11-18 ASSESSMENT — ACTIVITIES OF DAILY LIVING (ADL)
ADLS_ACUITY_SCORE: 23
ADLS_ACUITY_SCORE: 23
ADLS_ACUITY_SCORE: 24
ADLS_ACUITY_SCORE: 24
ADLS_ACUITY_SCORE: 23
ADLS_ACUITY_SCORE: 24
ADLS_ACUITY_SCORE: 23
ADLS_ACUITY_SCORE: 23

## 2023-11-18 NOTE — PROGRESS NOTES
Pain Service Progress Note  [facility]  Date: 11/18/2023       Patient Name: Aquiles Mckeon  MRN: [id number]  Age: 73 year old  Sex: male      Assessment:  Aquiles Mckeon is a 73 year old male who has PMH of severe multivessel CAD, prediabetes, HLD who underwent CABG x2, left Internal Mammary Artery [place], Endoscopic [place] of Left Greater Saphenous Vein on 11/16/23 by Dr. Mulvihill.       Procedure: CABG x2, left Internal Mammary Artery [place], Endoscopic [place] of Left Greater Saphenous Vein     Date of Surgery: 11/16/23    Date of Catheter Placement: 11/16/23 ---Please note this is a PV Catheter study patient.    Plan/Recommendations:  1. Regional Anesthesia/Analgesia  -Continuous Catheter Type/Site: bilateral paravertebral (PV)   Infusate: Ropivacaine 0.2%    Programmed Intermittent Bolus (PIB) at 7 mL Q60 min via each catheter, total infusion rate of 14 mL/hr    Patient was bolused with 5cc of 0.25% bupi at 1400 on 11/18/23.    Plan to maintain catheter, max of 7 days    2. Anticoagulation  -Please contact Inpatient Pain Service before ordering or making any anticoagulation changes       3. Multimodal Analgesia  - per primary service    Pain Service will continue to follow.    Discussed with attending anesthesiologist    Dakota Arellano MD    11/18/2023     Overnight Events: no major acute event    Tubes/Drains: Yes  Chest tubes    Subjective:  Doing well, was sleeping when I came in the room. Feeling more soreness and would like a bolus.       Symptoms of LAST: No    Pain Location:  Left upper back at nerve block catheter site    Pain Intensity:    Pain at Rest: 3/10     Satisfied with your level of pain control: Yes    Diet: Advance Diet as Tolerated: Regular Diet Adult    Relevant Labs:  Recent Labs   Lab Test 11/17/23  0429 11/16/23  1459   INR  --  1.44*   * 140*   PTT  --  25   BUN 19.5 13.5       Physical Exam:  Vitals: /80 (BP Location: Right arm)   Pulse  "86   Temp 37.2  C (98.9  F) (Oral)   Resp 16   Ht 1.664 m (5' 5.5\")   Wt 91.8 kg (202 lb 6.1 oz)   SpO2 92%   BMI 33.17 kg/m      Physical Exam:   Orientation:  Alert, oriented, and in no acute distress: Yes  Sedation: No    Motor Examination:  5/5 Strength in lower extremities: Yes        Catheter Site:   Catheter entry site is clean/dry/intact: Yes    Tender: Yes on the left insertion site, reportedly resolved in afternoon.      Relevant Medications:  Current Pain Medications:  Medications related to Pain Management (From now, onward)      Start     Dose/Rate Route Frequency Ordered Stop    11/19/23 0000  acetaminophen (TYLENOL) tablet 650 mg         650 mg Oral EVERY 4 HOURS PRN 11/16/23 1448      11/17/23 1400  [Held by provider]  acetaminophen (TYLENOL) tablet 975 mg        (On hold since today at 1127 until manually unheld; held by Luann Dukes MDHold Reason: Other)    975 mg Oral EVERY 8 HOURS SCHEDULED 11/17/23 1023 11/19/23 1359    11/17/23 0800  polyethylene glycol (MIRALAX) Packet 17 g         17 g Oral 2 TIMES DAILY 11/17/23 0700      11/17/23 0800  senna-docusate (SENOKOT-S/PERICOLACE) 8.6-50 MG per tablet 2 tablet         2 tablet Oral 2 TIMES DAILY 11/17/23 0700      11/17/23 0730  methocarbamol (ROBAXIN) tablet 500 mg         500 mg Oral EVERY 6 HOURS SCHEDULED 11/17/23 0700      11/16/23 2000  ROPivacaine 0.2% in NS perineural infusion (simple)          Perineural Continuous Nerve Block 11/16/23 1944 11/16/23 2000  ROPivacaine 0.2% in NS perineural infusion (simple)          Perineural Continuous Nerve Block 11/16/23 1944 11/16/23 1500  aspirin (ASA) chewable tablet 162 mg         162 mg Oral or NG Tube DAILY 11/16/23 1448      11/16/23 1448  magnesium hydroxide (MILK OF MAGNESIA) suspension 30 mL         30 mL Oral DAILY PRN 11/16/23 1448      11/16/23 1448  bisacodyl (DULCOLAX) suppository 10 mg         10 mg Rectal DAILY PRN 11/16/23 1448      11/16/23 1448  HYDROmorphone " "(DILAUDID) injection 0.2 mg        See Hyperspace for full Linked Orders Report.    0.2 mg Intravenous EVERY 2 HOURS PRN 11/16/23 1448      11/16/23 1448  HYDROmorphone (DILAUDID) injection 0.4 mg        See Hyperspace for full Linked Orders Report.    0.4 mg Intravenous EVERY 2 HOURS PRN 11/16/23 1448      11/16/23 1448  oxyCODONE (ROXICODONE) tablet 5 mg        See Hyperspace for full Linked Orders Report.    5 mg Oral EVERY 4 HOURS PRN 11/16/23 1448      11/16/23 1448  oxyCODONE IR (ROXICODONE) tablet 10 mg        See Hyperspace for full Linked Orders Report.    10 mg Oral EVERY 4 HOURS PRN 11/16/23 1448      11/16/23 1448  hydrOXYzine (ATARAX) tablet 10 mg         10 mg Oral EVERY 6 HOURS PRN 11/16/23 1448      11/16/23 1448  lidocaine 1 % 0.1-1 mL         0.1-1 mL Other EVERY 1 HOUR PRN 11/16/23 1448      11/16/23 1448  lidocaine (LMX4) cream          Topical EVERY 1 HOUR PRN 11/16/23 1448                          Acute Inpatient Pain Service [facility]  Hours of pain coverage 24/7   Page via Amcom- Please Page the Pain Team Via Amcom: \"PAIN MANAGEMENT ACUTE INPATIENT/ [facility]\"            "

## 2023-11-18 NOTE — PLAN OF CARE
"Goal Outcome Evaluation:      Plan of Care Reviewed With: patient    Overall Patient Progress: improvingOverall Patient Progress: improving    Outcome Evaluation: Pt ambulate to bathroom this morning. Pain tolerable with PRNs. Chest incision, DCI. CT sites, CDI, on suction to -20 and no air leak. VSS.    Shift: 1900-0730    VS: /76 (BP Location: Left arm)   Pulse 85   Temp 99.5  F (37.5  C) (Oral)   Resp 29   Ht 1.664 m (5' 5.5\")   Wt 91.8 kg (202 lb 6.1 oz)   SpO2 93%   BMI 33.17 kg/m       Pain: Chest incision site pain. Give PRN oxycodone x2 and Dilaudid x2. At acceptable level on current pain regimen.   Neuro: A&Ox4  Cardiac: Denies chest pain. SR. HR and BP stable.  Respiratory: Denies SOB. Sating >90% on 2L NC.     Diet/Appetite: Tolerating regular diet. Eating well this morning.    /GI: [place] with order to remove, however, pt refused to have it removed last night and okay to have it pulled out in the morning. Educate pt regarding the risk for an infection. No BM last night, passing gas.     LDA's: Right hand PIV saline locked. Bilateral paravertebral infusing with Ropivacaine.    Skin: No new deficit noted.    Activity: Assist of 1-2.     Changes during this shift: Pt has frequent cough this morning follow by unbearable pain. Sternal precaution in placed. Give PRN meds. Page CVTS, PRN cough medication and nebs were order.       "

## 2023-11-19 ENCOUNTER — APPOINTMENT (OUTPATIENT)
Dept: GENERAL RADIOLOGY | Facility: CLINIC | Age: 73
DRG: 236 | End: 2023-11-19
Attending: NURSE PRACTITIONER
Payer: COMMERCIAL

## 2023-11-19 ENCOUNTER — APPOINTMENT (OUTPATIENT)
Dept: PHYSICAL THERAPY | Facility: CLINIC | Age: 73
DRG: 236 | End: 2023-11-19
Attending: PHYSICIAN ASSISTANT
Payer: COMMERCIAL

## 2023-11-19 LAB
ANION GAP SERPL CALCULATED.3IONS-SCNC: 9 MMOL/L (ref 7–15)
BUN SERPL-MCNC: 25.9 MG/DL (ref 8–23)
CALCIUM SERPL-MCNC: 8.3 MG/DL (ref 8.8–10.2)
CHLORIDE SERPL-SCNC: 100 MMOL/L (ref 98–107)
CREAT SERPL-MCNC: 1.03 MG/DL (ref 0.67–1.17)
DEPRECATED HCO3 PLAS-SCNC: 25 MMOL/L (ref 22–29)
EGFRCR SERPLBLD CKD-EPI 2021: 77 ML/MIN/1.73M2
ERYTHROCYTE [DISTWIDTH] IN BLOOD BY AUTOMATED COUNT: 13.1 % (ref 10–15)
GLUCOSE BLDC GLUCOMTR-MCNC: 123 MG/DL (ref 70–99)
GLUCOSE SERPL-MCNC: 119 MG/DL (ref 70–99)
HCT VFR BLD AUTO: 31.1 % (ref 40–53)
HGB BLD-MCNC: 10.7 G/DL (ref 13.3–17.7)
MAGNESIUM SERPL-MCNC: 2.2 MG/DL (ref 1.7–2.3)
MCH RBC QN AUTO: 31 PG (ref 26.5–33)
MCHC RBC AUTO-ENTMCNC: 34.4 G/DL (ref 31.5–36.5)
MCV RBC AUTO: 90 FL (ref 78–100)
PHOSPHATE SERPL-MCNC: 2.5 MG/DL (ref 2.5–4.5)
PLATELET # BLD AUTO: 118 10E3/UL (ref 150–450)
POTASSIUM SERPL-SCNC: 4.2 MMOL/L (ref 3.4–5.3)
RBC # BLD AUTO: 3.45 10E6/UL (ref 4.4–5.9)
SODIUM SERPL-SCNC: 134 MMOL/L (ref 135–145)
WBC # BLD AUTO: 15.6 10E3/UL (ref 4–11)

## 2023-11-19 PROCEDURE — 97530 THERAPEUTIC ACTIVITIES: CPT | Mod: GP

## 2023-11-19 PROCEDURE — 84100 ASSAY OF PHOSPHORUS: CPT | Performed by: STUDENT IN AN ORGANIZED HEALTH CARE EDUCATION/TRAINING PROGRAM

## 2023-11-19 PROCEDURE — 36415 COLL VENOUS BLD VENIPUNCTURE: CPT | Performed by: NURSE PRACTITIONER

## 2023-11-19 PROCEDURE — 71045 X-RAY EXAM CHEST 1 VIEW: CPT | Mod: 26 | Performed by: RADIOLOGY

## 2023-11-19 PROCEDURE — 250N000013 HC RX MED GY IP 250 OP 250 PS 637

## 2023-11-19 PROCEDURE — 250N000013 HC RX MED GY IP 250 OP 250 PS 637: Performed by: PHYSICIAN ASSISTANT

## 2023-11-19 PROCEDURE — 99232 SBSQ HOSP IP/OBS MODERATE 35: CPT | Mod: GC | Performed by: ANESTHESIOLOGY

## 2023-11-19 PROCEDURE — 80048 BASIC METABOLIC PNL TOTAL CA: CPT | Performed by: NURSE PRACTITIONER

## 2023-11-19 PROCEDURE — 120N000003 HC R&B IMCU UMMC

## 2023-11-19 PROCEDURE — 97116 GAIT TRAINING THERAPY: CPT | Mod: GP

## 2023-11-19 PROCEDURE — 83735 ASSAY OF MAGNESIUM: CPT | Performed by: NURSE PRACTITIONER

## 2023-11-19 PROCEDURE — 250N000011 HC RX IP 250 OP 636: Mod: JZ | Performed by: PHYSICIAN ASSISTANT

## 2023-11-19 PROCEDURE — 250N000013 HC RX MED GY IP 250 OP 250 PS 637: Performed by: NURSE PRACTITIONER

## 2023-11-19 PROCEDURE — 250N000011 HC RX IP 250 OP 636: Mod: JZ | Performed by: STUDENT IN AN ORGANIZED HEALTH CARE EDUCATION/TRAINING PROGRAM

## 2023-11-19 PROCEDURE — 85027 COMPLETE CBC AUTOMATED: CPT

## 2023-11-19 PROCEDURE — 97162 PT EVAL MOD COMPLEX 30 MIN: CPT | Mod: GP

## 2023-11-19 PROCEDURE — 271N000002 HC RX 271: Performed by: STUDENT IN AN ORGANIZED HEALTH CARE EDUCATION/TRAINING PROGRAM

## 2023-11-19 PROCEDURE — 250N000011 HC RX IP 250 OP 636: Mod: JZ | Performed by: NURSE PRACTITIONER

## 2023-11-19 PROCEDURE — 250N000013 HC RX MED GY IP 250 OP 250 PS 637: Performed by: STUDENT IN AN ORGANIZED HEALTH CARE EDUCATION/TRAINING PROGRAM

## 2023-11-19 PROCEDURE — 71045 X-RAY EXAM CHEST 1 VIEW: CPT

## 2023-11-19 RX ORDER — FUROSEMIDE 10 MG/ML
40 INJECTION INTRAMUSCULAR; INTRAVENOUS ONCE
Status: COMPLETED | OUTPATIENT
Start: 2023-11-19 | End: 2023-11-19

## 2023-11-19 RX ORDER — METOPROLOL TARTRATE 25 MG/1
25 TABLET, FILM COATED ORAL 2 TIMES DAILY
Status: DISCONTINUED | OUTPATIENT
Start: 2023-11-19 | End: 2023-11-20

## 2023-11-19 RX ORDER — POLYETHYLENE GLYCOL 3350 17 G/17G
17 POWDER, FOR SOLUTION ORAL 2 TIMES DAILY PRN
Status: DISCONTINUED | OUTPATIENT
Start: 2023-11-19 | End: 2023-11-21 | Stop reason: HOSPADM

## 2023-11-19 RX ORDER — AMOXICILLIN 250 MG
2 CAPSULE ORAL 2 TIMES DAILY PRN
Status: DISCONTINUED | OUTPATIENT
Start: 2023-11-19 | End: 2023-11-21 | Stop reason: HOSPADM

## 2023-11-19 RX ADMIN — OXYCODONE HYDROCHLORIDE 10 MG: 10 TABLET ORAL at 13:00

## 2023-11-19 RX ADMIN — METHOCARBAMOL TABLETS 750 MG: 750 TABLET, COATED ORAL at 23:53

## 2023-11-19 RX ADMIN — PANTOPRAZOLE SODIUM 40 MG: 40 TABLET, DELAYED RELEASE ORAL at 08:48

## 2023-11-19 RX ADMIN — TAMSULOSIN HYDROCHLORIDE 0.4 MG: 0.4 CAPSULE ORAL at 08:48

## 2023-11-19 RX ADMIN — ACETAMINOPHEN 975 MG: 325 TABLET, FILM COATED ORAL at 05:36

## 2023-11-19 RX ADMIN — ROSUVASTATIN CALCIUM 40 MG: 20 TABLET, FILM COATED ORAL at 08:48

## 2023-11-19 RX ADMIN — HEPARIN SODIUM 5000 UNITS: 5000 INJECTION, SOLUTION INTRAVENOUS; SUBCUTANEOUS at 22:08

## 2023-11-19 RX ADMIN — METHOCARBAMOL TABLETS 750 MG: 750 TABLET, COATED ORAL at 13:01

## 2023-11-19 RX ADMIN — SIMETHICONE 80 MG: 80 TABLET, CHEWABLE ORAL at 08:48

## 2023-11-19 RX ADMIN — METHOCARBAMOL TABLETS 750 MG: 750 TABLET, COATED ORAL at 05:36

## 2023-11-19 RX ADMIN — GUAIFENESIN 600 MG: 600 TABLET ORAL at 20:02

## 2023-11-19 RX ADMIN — HYDROMORPHONE HYDROCHLORIDE 0.4 MG: 0.2 INJECTION, SOLUTION INTRAMUSCULAR; INTRAVENOUS; SUBCUTANEOUS at 20:13

## 2023-11-19 RX ADMIN — ASPIRIN 81 MG CHEWABLE TABLET 162 MG: 81 TABLET CHEWABLE at 08:48

## 2023-11-19 RX ADMIN — POTASSIUM & SODIUM PHOSPHATES POWDER PACK 280-160-250 MG 1 PACKET: 280-160-250 PACK at 13:00

## 2023-11-19 RX ADMIN — OXYCODONE HYDROCHLORIDE 10 MG: 10 TABLET ORAL at 17:49

## 2023-11-19 RX ADMIN — METOPROLOL TARTRATE 25 MG: 25 TABLET, FILM COATED ORAL at 08:48

## 2023-11-19 RX ADMIN — OXYCODONE HYDROCHLORIDE 10 MG: 10 TABLET ORAL at 04:33

## 2023-11-19 RX ADMIN — METOPROLOL TARTRATE 25 MG: 25 TABLET, FILM COATED ORAL at 20:02

## 2023-11-19 RX ADMIN — METHOCARBAMOL TABLETS 750 MG: 750 TABLET, COATED ORAL at 17:49

## 2023-11-19 RX ADMIN — SIMETHICONE 80 MG: 80 TABLET, CHEWABLE ORAL at 17:50

## 2023-11-19 RX ADMIN — SIMETHICONE 80 MG: 80 TABLET, CHEWABLE ORAL at 13:01

## 2023-11-19 RX ADMIN — Medication 5 MG: at 20:02

## 2023-11-19 RX ADMIN — ACETAMINOPHEN 650 MG: 325 TABLET, FILM COATED ORAL at 17:50

## 2023-11-19 RX ADMIN — OXYCODONE HYDROCHLORIDE 10 MG: 10 TABLET ORAL at 22:08

## 2023-11-19 RX ADMIN — POTASSIUM & SODIUM PHOSPHATES POWDER PACK 280-160-250 MG 1 PACKET: 280-160-250 PACK at 17:50

## 2023-11-19 RX ADMIN — HEPARIN SODIUM 5000 UNITS: 5000 INJECTION, SOLUTION INTRAVENOUS; SUBCUTANEOUS at 08:48

## 2023-11-19 RX ADMIN — OXYCODONE HYDROCHLORIDE 10 MG: 10 TABLET ORAL at 08:48

## 2023-11-19 RX ADMIN — FUROSEMIDE 40 MG: 10 INJECTION, SOLUTION INTRAVENOUS at 08:47

## 2023-11-19 RX ADMIN — GUAIFENESIN 600 MG: 600 TABLET ORAL at 08:49

## 2023-11-19 RX ADMIN — HEPARIN SODIUM 5000 UNITS: 5000 INJECTION, SOLUTION INTRAVENOUS; SUBCUTANEOUS at 13:00

## 2023-11-19 RX ADMIN — SIMETHICONE 80 MG: 80 TABLET, CHEWABLE ORAL at 23:53

## 2023-11-19 ASSESSMENT — ACTIVITIES OF DAILY LIVING (ADL)
ADLS_ACUITY_SCORE: 23

## 2023-11-19 NOTE — PROGRESS NOTES
"   11/19/23 1300   Appointment Info   Signing Clinician's Name / Credentials (PT) JOSE Mckeon   Student Supervision Direct supervision provided;Direct Patient Contact Provided;Therapy services provided with the co-signing licensed therapist guiding and directing the services, and providing the skilled judgement and assessment throughout the session   Rehab Comments (PT) sternal prec   Living Environment   People in Home spouse   Current Living Arrangements house   Home Accessibility stairs within home;stairs to enter home   Number of Stairs, Main Entrance 1   Stair Railings, Main Entrance railings safe and in good condition   Number of Stairs, Within Home, Primary greater than 10 stairs;other (see comments)  (14)   Stair Railings, Within Home, Primary railing on right side (ascending)   Transportation Anticipated family or friend will provide   Living Environment Comments Pt lives with spouse in home, 1 JAX, 14 stairs R railing to bedroom. Tub/shower combo. Two small dogs wtihin home   Self-Care   Usual Activity Tolerance moderate   Current Activity Tolerance fair   Regular Exercise No   Equipment Currently Used at Home none;cane, straight;walker, rolling   Fall history within last six months no   Activity/Exercise/Self-Care Comment Pt IND at baseline for self cares/ADLs with no AD. Has spouse able to take off work as needed for assist.   General Information   Onset of Illness/Injury or Date of Surgery 11/16/23   Referring Physician Vandana Lucas, PAMary KayC   Patient/Family Therapy Goals Statement (PT) return home   Pertinent History of Current Problem (include personal factors and/or comorbidities that impact the POC) Per EMR: \"Aquiles Mckeon is a 73 year old male with PMH of severe multivessel CAD, prediabetes, HLD who underwent CABG x2, left Internal Mammary Artery [place], Endoscopic [place] of Left Greater Saphenous Vein on 11/16/23 by Dr. Mulvihill.\"   Existing Precautions/Restrictions cardiac;fall;sternal "   Weight-Bearing Status - LUE partial weight-bearing (% in comments)   Weight-Bearing Status - RUE partial weight-bearing (% in comments)   Weight-Bearing Status - LLE full weight-bearing   Weight-Bearing Status - RLE full weight-bearing   General Observations Status Post Cardiac Surgery; eval and treat as indicated   Cognition   Affect/Mental Status (Cognition) WFL   Orientation Status (Cognition) oriented x 4   Follows Commands (Cognition) WFL   Pain Assessment   Patient Currently in Pain Yes, see Vital Sign flowsheet   Integumentary/Edema   Integumentary/Edema other (describe)   Integumentary/Edema Comments Incision appears to be healing appropriately, chest tubes removed 11/19 AM with mild serosangenous drainage seen through bandage   Posture    Posture Forward head position;Protracted shoulders   Range of Motion (ROM)   Range of Motion ROM is WFL   Strength (Manual Muscle Testing)   Strength (Manual Muscle Testing) strength is [facility]   Bed Mobility   Bed Mobility rolling right   Rolling Right [place] (Bed Mobility) verbal cues;contact guard;1 person assist   Bed Mobility Limitations decreased ability to use arms for pushing/pulling   Impairments Contributing to Impaired Bed Mobility pain;impaired balance;decreased strength   Transfers   Transfers sit-stand transfer;toilet transfer   Maintains Weight-bearing Status (Transfers) able to maintain   Impairments Contributing to Impaired Transfers impaired balance;pain;decreased strength   Sit-Stand Transfer   Sit-Stand [place] (Transfers) verbal cues;contact guard;1 person assist   Assistive Device (Sit-Stand Transfers) walker, front-wheeled   Toilet Transfer   [place] Level (Toilet Transfer) verbal cues;contact guard;1 person assist   Assistive Device (Toilet Transfer) walker, front-wheeled   Type (Toilet Transfer) sit-stand;stand-sit   Gait/Stairs (Locomotion)   [place] Level (Gait) verbal cues;contact guard;1 person assist   Assistive Device  (Gait) walker, front-wheeled   Distance in Feet (Gait) 10'   Pattern (Gait) step-through   Deviations/Abnormal Patterns (Gait) base of support, wide;mary decreased;gait speed decreased;stride length decreased   Maintains Weight-bearing Status (Gait) able to maintain   Negotiation (Stairs) stairs independence;handrail location;number of steps;ascending technique;descending technique;maintains weight-bearing status   [place] (Stairs) verbal cues;contact guard;1 person assist   Handrail Location (Stairs) right side (ascending)   Number of Steps (Stairs) 3   Ascending Technique (Stairs) step-to-step   Descending Technique (Stairs) step-to-step   Maintains Weight-bearing Status (Stairs) able to maintain   Balance   Balance other (describe)   Sitting Balance: Static good balance   Sitting Balance: Dynamic fair balance   Sit-to-Stand Balance fair balance   Standing Balance: Static fair balance   Standing Balance: Dynamic fair balance   Balance Quick Add Sitting balance: Static;Sitting balance: dynamic;Sit to stand balance;Standing balance: static;Standing balance: dynamic   Sensory Examination   Sensory Perception patient reports no sensory changes   Coordination   Coordination no deficits were identified   Muscle Tone   Muscle Tone no deficits were identified   Clinical Impression   Criteria for Skilled Therapeutic Intervention Yes, treatment indicated   PT Diagnosis (PT) per clinical judgement   Influenced by the following impairments pain, and decreased strength, activity tolerance, and balance   Functional limitations due to impairments bed mobility, transfers, ambulation, and stairs   Clinical Presentation (PT Evaluation Complexity) evolving   Clinical Presentation Rationale per clinical judgement   Clinical Decision Making (Complexity) moderate complexity   Planned Therapy Interventions (PT) balance training;bed mobility training;gait training;neuromuscular re-education;patient/family education;postural  re-education;stair training;strengthening;transfer training   Risk & Benefits of therapy have been explained evaluation/treatment results reviewed;care plan/treatment goals reviewed;risks/benefits reviewed;current/potential barriers reviewed;participants voiced agreement with care plan;participants included;patient;friend   PT Total Evaluation Time   PT Eval, Moderate Complexity Minutes (39875) 8   Physical Therapy Goals   PT Frequency 5x/week   PT Predicted Duration/Target Date for Goal Attainment 12/17/23   PT Goals Bed Mobility;Transfers;Gait;Stairs   PT: Bed Mobility Modified independent;Rolling;Within precautions   PT: Transfers Modified independent;Sit to/from stand;Within precautions   PT: Gait Modified independent;Within precautions;Greater than 200 feet   PT: Stairs Modified independent;Greater than 10 stairs;Rail on right  (14 stairs)   Interventions   Interventions Quick Adds Therapeutic Activity;Gait Training   Therapeutic Activity   Therapeutic Activities: dynamic activities to improve functional performance Minutes (63534) 10   Symptoms Noted During/After Treatment Increased pain;Shortness of breath   Treatment Detail/Skilled Intervention Pt greeted in supine, agreeable to therapy, ok'd per RN. VSS at onset. RN and visitors in room. Extra time for room set up and L/T managament. Reviewed sternal precautions with pt able to remember all. Pt performed bed mobility via log roll technique with CGA and vcing for sequencing. Pt reported mild increase in pain. Pt demonstrated good static sitting balance EOB with intermittent use of UE for support. Sit>stand from EOB to IV pole with CGA. Pt able to stay within sternal precautions with no vcing. Pt performed 3 sit<>stands from highback chair to no AD with CGA and no vcing to stay within sternal precautions. Pt performed stand<>sit from toilet CGA to IV pole within precautions. Performed sit>supine via log roll SBA with vcing for sequencing. Discussed POC with  pt and answered all pt questions. Pt supine in bed at end of session with call light in reach and visitors in room.   Gait Training   Gait Training Minutes (36609) 20   Symptoms Noted During/After Treatment (Gait Training) fatigue;increased pain;shortness of breath   Treatment Detail/Skilled Intervention Facilitated gait training to increase IND and safety with ambulation. Monitored O2 and BP with exercise at RN request. BP stayed within MD parameters during exercise. Pt ambulated 150' with B UE support IV pole and CGA. Pt demonstrated slightly slow gait speed with step through gait pattern and no LOB. Pt O2 sats stayed above 92 with ambulation. Pt required seated rest break after ambulation. Pt performed 2x3 ascending/descending stairs with R ascending railing. Performed step to step pattern slowly within precautions and CGA and seated rest break in between. O2 at 87% after stairs, vcing for PLB with O2 recovery to 95% in less than 20s. Pt ambulated 180' with no AD and CGA; increased gait speed w/o AD. Pt demonstrated increased B lateral sway and minor LOB w/o AD but able to self correct with ease.   PT Discharge Planning   PT Plan progress stairs, review precautions, flatbed mobility, progress ambulation no AD   PT Discharge Recommendation (DC Rec) home with assist;home with outpatient cardiac rehab   PT Rationale for DC Rec Pt requiring up to CGA for bed mobility, transfers, ambulation, and stairs. Pt has spouse at home able to be present 24/7 and willing/able to assist. Recommend home with assist and OP CR to increase stregnth, activity tolerance, and balance.   PT Brief overview of current status up to CGA   Total Session Time   Timed Code Treatment Minutes 30   Total Session Time (sum of timed and untimed services) 38

## 2023-11-19 NOTE — PROGRESS NOTES
Pain Service Progress Note  [facility]  Date: 11/19/2023       Patient Name: Aquiles Mckeon  MRN: [id number]  Age: 73 year old  Sex: male      Assessment:  Aquiles Mckeon is a 73 year old male who has PMH of severe multivessel CAD, prediabetes, HLD who underwent CABG x2, left Internal Mammary Artery [place], Endoscopic [place] of Left Greater Saphenous Vein on 11/16/23 by Dr. Mulvihill.     Procedure: CABG x2, left Internal Mammary Artery [place], Endoscopic [place] of Left Greater Saphenous Vein     Date of Surgery: 11/16/23    Date of Catheter Placement: 11/16/23     ---Please note this is a PV Catheter study patient---    Plan/Recommendations:  1. Regional Anesthesia/Analgesia  -Continuous Catheter Type/Site: bilateral paravertebral (PV)   Infusate: Ropivacaine 0.2%    Programmed Intermittent Bolus (PIB) at 7 mL Q60 min via each catheter, total infusion rate of 14 mL/hr    Patient was bolused with 5cc of 0.25% bupi at 1400 on 11/18/23.    Plan to maintain catheter, max of 7 days    Following removal of the chest tubes, the paravertebral catheters were subsequently removed with both catheter tips intact.       2. Anticoagulation  -Please contact Inpatient Pain Service before ordering or making any anticoagulation changes    Prophylactic subcutaneous heparin was held >4 hours prior to catheter removal.  Okay to resume subsequent subcutaneous heparin. This was communicated directly with patient's RN.    3. Multimodal Analgesia  - per primary service    Pain Service will sign off.     Discussed with attending anesthesiologist, Dr. Mireles.    Sravanthi Sweet MD  PGY-3 Anesthesiology   11/19/2023     Overnight Events: no major acute event    Tubes/Drains: Yes  Chest tubes    Subjective:  Feeling well. Excited that chest tubes were removed today, feels like he is headed in the right direction.     Symptoms of LAST: No    Pain Location:  Left chest    Pain Intensity:    Pain at Rest: 3/10  "    Satisfied with your level of pain control: Yes    Diet: Advance Diet as Tolerated: Regular Diet Adult    Relevant Labs:  Recent Labs   Lab Test 11/17/23  0429 11/16/23  1459   INR  --  1.44*   * 140*   PTT  --  25   BUN 19.5 13.5       Physical Exam:  Vitals: /77 (BP Location: Left arm, Cuff Size: Adult Regular)   Pulse 91   Temp 36.9  C (98.5  F) (Oral)   Resp 20   Ht 1.664 m (5' 5.5\")   Wt 95.9 kg (211 lb 8 oz)   SpO2 95%   BMI 34.66 kg/m      Physical Exam:   Orientation:  Alert, oriented, and in no acute distress: Yes  Sedation: No    Motor Examination:  5/5 Strength in lower extremities: Yes        Catheter Site:   Catheter entry site is clean/dry/intact: Yes    Tender: No      Relevant Medications:  Current Pain Medications:  Medications related to Pain Management (From now, onward)      Start     Dose/Rate Route Frequency Ordered Stop    11/19/23 0000  acetaminophen (TYLENOL) tablet 650 mg         650 mg Oral EVERY 4 HOURS PRN 11/16/23 1448      11/17/23 1400  [Held by provider]  acetaminophen (TYLENOL) tablet 975 mg        (On hold since today at 1127 until manually unheld; held by Luann Dukes MDHold Reason: Other)    975 mg Oral EVERY 8 HOURS SCHEDULED 11/17/23 1023 11/19/23 1359    11/17/23 0800  polyethylene glycol (MIRALAX) Packet 17 g         17 g Oral 2 TIMES DAILY 11/17/23 0700      11/17/23 0800  senna-docusate (SENOKOT-S/PERICOLACE) 8.6-50 MG per tablet 2 tablet         2 tablet Oral 2 TIMES DAILY 11/17/23 0700      11/17/23 0730  methocarbamol (ROBAXIN) tablet 500 mg         500 mg Oral EVERY 6 HOURS SCHEDULED 11/17/23 0700      11/16/23 2000  ROPivacaine 0.2% in NS perineural infusion (simple)          Perineural Continuous Nerve Block 11/16/23 1944 11/16/23 2000  ROPivacaine 0.2% in NS perineural infusion (simple)          Perineural Continuous Nerve Block 11/16/23 1944 11/16/23 1500  aspirin (ASA) chewable tablet 162 mg         162 mg Oral or NG Tube " "DAILY 11/16/23 1448      11/16/23 1448  magnesium hydroxide (MILK OF MAGNESIA) suspension 30 mL         30 mL Oral DAILY PRN 11/16/23 1448      11/16/23 1448  bisacodyl (DULCOLAX) suppository 10 mg         10 mg Rectal DAILY PRN 11/16/23 1448      11/16/23 1448  HYDROmorphone (DILAUDID) injection 0.2 mg        See Hyperspace for full Linked Orders Report.    0.2 mg Intravenous EVERY 2 HOURS PRN 11/16/23 1448      11/16/23 1448  HYDROmorphone (DILAUDID) injection 0.4 mg        See Hyperspace for full Linked Orders Report.    0.4 mg Intravenous EVERY 2 HOURS PRN 11/16/23 1448      11/16/23 1448  oxyCODONE (ROXICODONE) tablet 5 mg        See Hyperspace for full Linked Orders Report.    5 mg Oral EVERY 4 HOURS PRN 11/16/23 1448      11/16/23 1448  oxyCODONE IR (ROXICODONE) tablet 10 mg        See Hyperspace for full Linked Orders Report.    10 mg Oral EVERY 4 HOURS PRN 11/16/23 1448      11/16/23 1448  hydrOXYzine (ATARAX) tablet 10 mg         10 mg Oral EVERY 6 HOURS PRN 11/16/23 1448      11/16/23 1448  lidocaine 1 % 0.1-1 mL         0.1-1 mL Other EVERY 1 HOUR PRN 11/16/23 1448      11/16/23 1448  lidocaine (LMX4) cream          Topical EVERY 1 HOUR PRN 11/16/23 1448                      Acute Inpatient Pain Service [facility]  Hours of pain coverage 24/7   Page via Amcom- Please Page the Pain Team Via Amcom: \"PAIN MANAGEMENT ACUTE INPATIENT/ [facility]\"            "

## 2023-11-19 NOTE — PLAN OF CARE
"Goal Outcome Evaluation:      Plan of Care Reviewed With: patient    Overall Patient Progress: improvingOverall Patient Progress: improving    Shift: 3186-8196    VS: /81 (BP Location: Right arm)   Pulse 91   Temp 97.9  F (36.6  C) (Axillary)   Resp 18   Ht 1.664 m (5' 5.5\")   Wt 95.9 kg (211 lb 8 oz)   SpO2 92%   BMI 34.66 kg/m      Pain: Chest incision site pain. Give PRN oxycodone x2.   Neuro: A&Ox4  Cardiac: Denies chest pain. SR. HR and BP stable.  Respiratory: Denies SOB. Sating on RA.     Diet/Appetite: Tolerating regular diet.   /GI: External catheter with adequate urine output. BM x1, loose.       LDA's: Left forearm PIV saline lock. Bilateral paravertebral infusing with Ropivacaine.    Skin: No new deficit noted.    Activity: Stand by assist.         "

## 2023-11-19 NOTE — PROGRESS NOTES
Cardiovascular Surgery Progress Note  11/19/2023         Assessment and Plan:     Aquiles Mckeon is a 73 year old male with PMH of severe multivessel CAD, prediabetes, HLD who underwent CABG x2, left Internal Mammary Artery [place], Endoscopic [place] of Left Greater Saphenous Vein on 11/16/23 by Dr. Mulvihill.     Cardiovascular:   Severe multivessel CAD s/p CABG x2  HLD/HTN  No arrhythmias overnight, HD stable, in NSR.  Most recent echo demonstrated preserved biventricular function  -  mg daily  - Rosuvastatin 40 mg daily  - BB: metoprolol tartrate 25 mg BID  - Diuresis as below  Chest tubes:  in place with 200 mL ssang output in 24h, will remove today 11/19  TPWs: Removed today, 11/19    Pulmonary:  Previous smoker  Extubated POD 0 to NC. Now saturating well on RA   - Pulm hygiene, IS, activity and deep breathing  - Mucinex    Neurology / MSK:  Acute post-operative pain  Hx Bell's Palsy residual left sided heaviness  Pain well controlled with current regimen:  - Acetaminophen, PO oxycodone PRN, , methocarbamol  - Pain team following, appreciate     / Renal / Fluid / Electrolytes:  BL creat ~ 0.9, most recent creatinine stable; adequate UOP.   FLUID STATUS: Pre-op weight 202 lbs, weight today 211 lbs ; I/O past 24 hours: +425  mL  - Diuresis: lasix 40 mg IV x1 and evaluate response  - Replete lytes per protocol  - Strict I/O, daily weights  - Avoid/limit nephrotoxins as able    GI / Nutrition:   - Tolerating regular diet  - Continue bowel regimen, no BM yet    Endocrine:  Stress induced hyperglycemia  Prediabetes   Hgb A1c 5.9%  - Initially managed on insulin drip postop, transitioned to sliding scale; goal BG <180 for optimal healing    Infectious Disease:  Stress induced leukocytosis  WBC 15.7 and trending down, tmax 100.7, no signs or symptoms of infection  - Completed perioperative antibiotics  - Continue to monitor fever curve, CBC    Hematology:   Acute blood loss anemia and thrombocytopenia  Hgb  stable; Plt stable no signs or symptoms of active bleeding  - CBC in AM    Anticoagulation:   - ASA only    MSK/Skin:  Sternotomy  Surgical incision  - Sternal precautions  - Incisional cares per protocol    Prophylaxis:   - Stress ulcer prophylaxis: Pantoprazole 40 mg daily  - DVT prophylaxis: Subcutaneous heparin, SCD    Disposition:   - Transferred to  on 11/17  - Therapies recommending discharge to home with OP CR when medically ready    Dr. Mulvihill has been informed of changes through both verbal and written communication.      DOUG Lim, [facility]-, CCRN  Nurse Practitioner  Cardiothoracic Surgery  Pager: [phone]        Interval History:     No acute events overnight. States pain is well managed on current regimen, slept well. Breathing is stable on room air, loose productive cough, working with IS. Tolerating diet, is passing flatus, +BM, no n/v. Denies chest pain, palpitations, dizziness, syncopal symptoms, chills, myalgias, sternal popping/clicking.         Physical Exam:     Gen: A&Ox4, NAD  Neuro: Intact with no focal deficits   CV: RRR, normal S1 S2, no murmurs, rubs or gallops. no JVD  Pulm: CTA, no wheezing or rhonchi, normal breathing on RA  Abd: nondistended, normal BS, soft, nontender  Ext: no peripheral edema, no pitting  Incision: clean, dry, intact, no erythema, sternum stable  Tubes/drain sites: dressing clean and dry, serosanguinous output, no air leak. 24 hr output 200 mL.          Data:    Imaging:  reviewed recent imaging, no acute concerns    No results found for this or any previous visit (from the past 24 hour(s)).       Labs:  Recent Labs   Lab 11/19/23  0643 11/19/23  0106 11/18/23  2211 11/18/23  0722 11/18/23  0552 11/17/23  2321 11/17/23  1937 11/17/23  0437 11/17/23  0429 11/16/23  1551 11/16/23  1459 11/16/23  1335 11/16/23  1313   WBC  --   --   --   --  17.6*  --   --   --  12.7*  --  20.1*  --   --    HGB  --   --   --   --  11.6*  --   --   --  10.9*  --  10.9*    < >  --    MCV  --   --   --   --  96  --   --   --  90  --  93  --   --    PLT  --   --   --   --  123*  --   --   --  127*  --  140*  --  134*   INR  --   --   --   --   --   --   --   --   --   --  1.44*  --  1.68*   *  --   --   --  135  --  132*  --  137  --  141   < >  --    POTASSIUM 4.2  --   --   --  5.3  --  4.3  --  4.7  --  4.7   < >  --    CHLORIDE 100  --   --   --  100  --  97*  --  104  --  105   < >  --    CO2 25  --   --   --  26  --  20*  --  25  --  24   < >  --    BUN 25.9*  --   --   --  25.1*  --  22.1  --  19.5  --  13.5   < >  --    CR 1.03  --   --   --  1.31*  --  1.21*  --  1.16  --  0.88   < >  --    ANIONGAP 9  --   --   --  9  --  15  --  8  --  12   < >  --    DANICA 8.3*  --   --   --  8.8  --  8.2*  --  8.2*  --  8.2*   < >  --    * 123* 134*   < > 129*   < > 139*   < > 113*   < > 132*   < >  --    ALBUMIN  --   --   --   --  3.3*  --   --   --  3.3*  --  3.0*   < >  --    PROTTOTAL  --   --   --   --  5.8*  --   --   --  5.1*  --  4.5*   < >  --    BILITOTAL  --   --   --   --  0.7  --   --   --  0.8  --  0.6   < >  --    ALKPHOS  --   --   --   --  44  --   --   --  42  --  41   < >  --    ALT  --   --   --   --  32  --   --   --  34  --  28   < >  --    AST  --   --   --   --  61*  --   --   --  61*  --  39   < >  --     < > = values in this interval not displayed.      GLUCOSE:   Recent Labs   Lab 11/19/23  0643 11/19/23  0106 11/18/23  2211 11/18/23  1641 11/18/23  1211 11/18/23  0722   * 123* 134* 125* 123* 126*

## 2023-11-20 ENCOUNTER — APPOINTMENT (OUTPATIENT)
Dept: OCCUPATIONAL THERAPY | Facility: CLINIC | Age: 73
DRG: 236 | End: 2023-11-20
Attending: STUDENT IN AN ORGANIZED HEALTH CARE EDUCATION/TRAINING PROGRAM
Payer: COMMERCIAL

## 2023-11-20 ENCOUNTER — APPOINTMENT (OUTPATIENT)
Dept: GENERAL RADIOLOGY | Facility: CLINIC | Age: 73
DRG: 236 | End: 2023-11-20
Attending: NURSE PRACTITIONER
Payer: COMMERCIAL

## 2023-11-20 ENCOUNTER — APPOINTMENT (OUTPATIENT)
Dept: GENERAL RADIOLOGY | Facility: CLINIC | Age: 73
DRG: 236 | End: 2023-11-20
Attending: STUDENT IN AN ORGANIZED HEALTH CARE EDUCATION/TRAINING PROGRAM
Payer: COMMERCIAL

## 2023-11-20 ENCOUNTER — APPOINTMENT (OUTPATIENT)
Dept: PHYSICAL THERAPY | Facility: CLINIC | Age: 73
DRG: 236 | End: 2023-11-20
Attending: STUDENT IN AN ORGANIZED HEALTH CARE EDUCATION/TRAINING PROGRAM
Payer: COMMERCIAL

## 2023-11-20 LAB
ANION GAP SERPL CALCULATED.3IONS-SCNC: 11 MMOL/L (ref 7–15)
BUN SERPL-MCNC: 19.2 MG/DL (ref 8–23)
CALCIUM SERPL-MCNC: 8.4 MG/DL (ref 8.8–10.2)
CHLORIDE SERPL-SCNC: 99 MMOL/L (ref 98–107)
CREAT SERPL-MCNC: 0.92 MG/DL (ref 0.67–1.17)
DEPRECATED HCO3 PLAS-SCNC: 27 MMOL/L (ref 22–29)
EGFRCR SERPLBLD CKD-EPI 2021: 88 ML/MIN/1.73M2
ERYTHROCYTE [DISTWIDTH] IN BLOOD BY AUTOMATED COUNT: 13.2 % (ref 10–15)
GLUCOSE BLDC GLUCOMTR-MCNC: 129 MG/DL (ref 70–99)
GLUCOSE SERPL-MCNC: 122 MG/DL (ref 70–99)
HCT VFR BLD AUTO: 32.4 % (ref 40–53)
HGB BLD-MCNC: 10.7 G/DL (ref 13.3–17.7)
LACTATE SERPL-SCNC: 0.8 MMOL/L (ref 0.7–2)
MAGNESIUM SERPL-MCNC: 2.3 MG/DL (ref 1.7–2.3)
MCH RBC QN AUTO: 30.7 PG (ref 26.5–33)
MCHC RBC AUTO-ENTMCNC: 33 G/DL (ref 31.5–36.5)
MCV RBC AUTO: 93 FL (ref 78–100)
PHOSPHATE SERPL-MCNC: 3 MG/DL (ref 2.5–4.5)
PLATELET # BLD AUTO: 165 10E3/UL (ref 150–450)
POTASSIUM SERPL-SCNC: 3.9 MMOL/L (ref 3.4–5.3)
RBC # BLD AUTO: 3.49 10E6/UL (ref 4.4–5.9)
SODIUM SERPL-SCNC: 137 MMOL/L (ref 135–145)
WBC # BLD AUTO: 14.7 10E3/UL (ref 4–11)

## 2023-11-20 PROCEDURE — 250N000013 HC RX MED GY IP 250 OP 250 PS 637

## 2023-11-20 PROCEDURE — 71045 X-RAY EXAM CHEST 1 VIEW: CPT

## 2023-11-20 PROCEDURE — 250N000009 HC RX 250

## 2023-11-20 PROCEDURE — 71046 X-RAY EXAM CHEST 2 VIEWS: CPT

## 2023-11-20 PROCEDURE — 250N000013 HC RX MED GY IP 250 OP 250 PS 637: Performed by: NURSE PRACTITIONER

## 2023-11-20 PROCEDURE — 120N000003 HC R&B IMCU UMMC

## 2023-11-20 PROCEDURE — 93005 ELECTROCARDIOGRAM TRACING: CPT

## 2023-11-20 PROCEDURE — 97530 THERAPEUTIC ACTIVITIES: CPT | Mod: GO

## 2023-11-20 PROCEDURE — 93010 ELECTROCARDIOGRAM REPORT: CPT | Performed by: INTERNAL MEDICINE

## 2023-11-20 PROCEDURE — 250N000011 HC RX IP 250 OP 636: Mod: JZ | Performed by: NURSE PRACTITIONER

## 2023-11-20 PROCEDURE — 250N000013 HC RX MED GY IP 250 OP 250 PS 637: Performed by: PHYSICIAN ASSISTANT

## 2023-11-20 PROCEDURE — 84100 ASSAY OF PHOSPHORUS: CPT | Performed by: STUDENT IN AN ORGANIZED HEALTH CARE EDUCATION/TRAINING PROGRAM

## 2023-11-20 PROCEDURE — 250N000011 HC RX IP 250 OP 636: Mod: JZ | Performed by: PHYSICIAN ASSISTANT

## 2023-11-20 PROCEDURE — 85027 COMPLETE CBC AUTOMATED: CPT

## 2023-11-20 PROCEDURE — 71045 X-RAY EXAM CHEST 1 VIEW: CPT | Mod: 26 | Performed by: RADIOLOGY

## 2023-11-20 PROCEDURE — 71046 X-RAY EXAM CHEST 2 VIEWS: CPT | Mod: 26 | Performed by: RADIOLOGY

## 2023-11-20 PROCEDURE — 36415 COLL VENOUS BLD VENIPUNCTURE: CPT | Performed by: STUDENT IN AN ORGANIZED HEALTH CARE EDUCATION/TRAINING PROGRAM

## 2023-11-20 PROCEDURE — 999N000157 HC STATISTIC RCP TIME EA 10 MIN

## 2023-11-20 PROCEDURE — 97535 SELF CARE MNGMENT TRAINING: CPT | Mod: GO

## 2023-11-20 PROCEDURE — 94640 AIRWAY INHALATION TREATMENT: CPT | Mod: 76

## 2023-11-20 PROCEDURE — 83605 ASSAY OF LACTIC ACID: CPT | Performed by: STUDENT IN AN ORGANIZED HEALTH CARE EDUCATION/TRAINING PROGRAM

## 2023-11-20 PROCEDURE — 80048 BASIC METABOLIC PNL TOTAL CA: CPT | Performed by: NURSE PRACTITIONER

## 2023-11-20 PROCEDURE — 94640 AIRWAY INHALATION TREATMENT: CPT

## 2023-11-20 PROCEDURE — 83735 ASSAY OF MAGNESIUM: CPT | Performed by: STUDENT IN AN ORGANIZED HEALTH CARE EDUCATION/TRAINING PROGRAM

## 2023-11-20 PROCEDURE — 97530 THERAPEUTIC ACTIVITIES: CPT | Mod: GP

## 2023-11-20 PROCEDURE — 250N000011 HC RX IP 250 OP 636: Performed by: STUDENT IN AN ORGANIZED HEALTH CARE EDUCATION/TRAINING PROGRAM

## 2023-11-20 RX ORDER — METOPROLOL SUCCINATE 50 MG/1
50 TABLET, EXTENDED RELEASE ORAL DAILY
Status: DISCONTINUED | OUTPATIENT
Start: 2023-11-20 | End: 2023-11-21

## 2023-11-20 RX ORDER — FUROSEMIDE 10 MG/ML
40 INJECTION INTRAMUSCULAR; INTRAVENOUS ONCE
Qty: 4 ML | Refills: 0 | Status: COMPLETED | OUTPATIENT
Start: 2023-11-20 | End: 2023-11-20

## 2023-11-20 RX ADMIN — ACETAMINOPHEN 650 MG: 325 TABLET, FILM COATED ORAL at 01:48

## 2023-11-20 RX ADMIN — SIMETHICONE 80 MG: 80 TABLET, CHEWABLE ORAL at 19:42

## 2023-11-20 RX ADMIN — METOPROLOL TARTRATE 25 MG: 25 TABLET, FILM COATED ORAL at 08:35

## 2023-11-20 RX ADMIN — HEPARIN SODIUM 5000 UNITS: 5000 INJECTION, SOLUTION INTRAVENOUS; SUBCUTANEOUS at 14:26

## 2023-11-20 RX ADMIN — HYDROMORPHONE HYDROCHLORIDE 0.4 MG: 0.2 INJECTION, SOLUTION INTRAMUSCULAR; INTRAVENOUS; SUBCUTANEOUS at 11:20

## 2023-11-20 RX ADMIN — FUROSEMIDE 40 MG: 10 INJECTION, SOLUTION INTRAVENOUS at 11:08

## 2023-11-20 RX ADMIN — SIMETHICONE 80 MG: 80 TABLET, CHEWABLE ORAL at 11:08

## 2023-11-20 RX ADMIN — OXYCODONE HYDROCHLORIDE 10 MG: 10 TABLET ORAL at 08:35

## 2023-11-20 RX ADMIN — METHOCARBAMOL TABLETS 750 MG: 750 TABLET, COATED ORAL at 18:24

## 2023-11-20 RX ADMIN — SIMETHICONE 80 MG: 80 TABLET, CHEWABLE ORAL at 08:35

## 2023-11-20 RX ADMIN — PANTOPRAZOLE SODIUM 40 MG: 40 TABLET, DELAYED RELEASE ORAL at 08:35

## 2023-11-20 RX ADMIN — IPRATROPIUM BROMIDE AND ALBUTEROL SULFATE 3 ML: .5; 3 SOLUTION RESPIRATORY (INHALATION) at 06:15

## 2023-11-20 RX ADMIN — ASPIRIN 81 MG CHEWABLE TABLET 162 MG: 81 TABLET CHEWABLE at 08:35

## 2023-11-20 RX ADMIN — GUAIFENESIN 200 MG: 200 SOLUTION ORAL at 08:36

## 2023-11-20 RX ADMIN — HEPARIN SODIUM 5000 UNITS: 5000 INJECTION, SOLUTION INTRAVENOUS; SUBCUTANEOUS at 08:36

## 2023-11-20 RX ADMIN — HYDROMORPHONE HYDROCHLORIDE 0.4 MG: 0.2 INJECTION, SOLUTION INTRAMUSCULAR; INTRAVENOUS; SUBCUTANEOUS at 05:09

## 2023-11-20 RX ADMIN — POLYETHYLENE GLYCOL 3350 17 G: 17 POWDER, FOR SOLUTION ORAL at 08:36

## 2023-11-20 RX ADMIN — OXYCODONE HYDROCHLORIDE 10 MG: 10 TABLET ORAL at 01:48

## 2023-11-20 RX ADMIN — GUAIFENESIN 600 MG: 600 TABLET ORAL at 08:35

## 2023-11-20 RX ADMIN — TAMSULOSIN HYDROCHLORIDE 0.4 MG: 0.4 CAPSULE ORAL at 08:35

## 2023-11-20 RX ADMIN — SIMETHICONE 80 MG: 80 TABLET, CHEWABLE ORAL at 16:37

## 2023-11-20 RX ADMIN — OXYCODONE HYDROCHLORIDE 10 MG: 10 TABLET ORAL at 14:26

## 2023-11-20 RX ADMIN — Medication 5 MG: at 19:42

## 2023-11-20 RX ADMIN — METOPROLOL SUCCINATE 50 MG: 50 TABLET, EXTENDED RELEASE ORAL at 16:37

## 2023-11-20 RX ADMIN — METHOCARBAMOL TABLETS 750 MG: 750 TABLET, COATED ORAL at 23:56

## 2023-11-20 RX ADMIN — HYDROMORPHONE HYDROCHLORIDE 0.4 MG: 0.2 INJECTION, SOLUTION INTRAMUSCULAR; INTRAVENOUS; SUBCUTANEOUS at 03:19

## 2023-11-20 RX ADMIN — HEPARIN SODIUM 5000 UNITS: 5000 INJECTION, SOLUTION INTRAVENOUS; SUBCUTANEOUS at 21:13

## 2023-11-20 RX ADMIN — METHOCARBAMOL TABLETS 750 MG: 750 TABLET, COATED ORAL at 11:08

## 2023-11-20 RX ADMIN — OXYCODONE HYDROCHLORIDE 10 MG: 10 TABLET ORAL at 18:24

## 2023-11-20 RX ADMIN — METHOCARBAMOL TABLETS 750 MG: 750 TABLET, COATED ORAL at 06:24

## 2023-11-20 RX ADMIN — ROSUVASTATIN CALCIUM 40 MG: 20 TABLET, FILM COATED ORAL at 08:35

## 2023-11-20 RX ADMIN — GUAIFENESIN 200 MG: 200 SOLUTION ORAL at 03:15

## 2023-11-20 RX ADMIN — GUAIFENESIN 600 MG: 600 TABLET ORAL at 19:42

## 2023-11-20 ASSESSMENT — ACTIVITIES OF DAILY LIVING (ADL)
ADLS_ACUITY_SCORE: 23

## 2023-11-20 NOTE — PROGRESS NOTES
"Cardiovascular Surgery Brief Note    PATIENT NAME: Aquiles Mckeon  MRN: [id number]  DATE: 11/20/2023 5:07 AM  LAST PROCEDURE: Median Sternotomy, Cardiopulmonary Bypass, Intraoperative Transesophageal Echocardiogram, Coronary Artery Bypass Graft x 2, Left Internal Mammary Artery [place], Endoscopic [place] of Left Greater Saphenous Vein, Intraoperative Epi-Aortic Untrasound: 11/16/2023    Paged by bedside RN \"Please come to bedside. Pt has pounding on the chest. Can feel the pounding.\"    Evaluated patient at bedside with bedside RN. Patient ~ 15 minutes of palpitations with a pounding sensation in his R chest. He denies chest pain, sob, nausea, vomiting, dizziness, and lightheadedness. Patient requested to go to have a bowel movement during my exam.     RRR to PP, no MRG  NLB on RA, normal chest wall rise. CTAB. Satting 95%+ on RA  NAD  Abdomen soft, non-tender, non-distended  Pulse present on substernal palpation  No JVD    Vital Signs: Most Recent  Ranges (24 hours)   Temp: 99.2  F (37.3  C)  Pulse: 91  BP: (!) 153/139  Resp: 18  SpO2: 91 % on None (Room air) Temp  Min: 98.4  F (36.9  C)  Max: 99.3  F (37.4  C)  Pulse  Min: 88  Max: 105  BP  Min: 105/78  Max: 153/139  Resp  Min: 17  Max: 23  SpO2  Min: 91 %  Max: 95 %   During exam BP read 153/139, manual BP 130s/90s.     Plan:  Cbc, lytes  Lactate => WNL  EKG => NSR with premature supraventricular complexes  Chest xray => grossly unchanged    Current evaluation reassuring, discussed with PGY-2 who agrees. Will defer further management to day team.     Please page with further questions or concerns  Madhu Smith MD  General Surgery Resident  Please page [facility]  "

## 2023-11-20 NOTE — PROGRESS NOTES
CLINICAL NUTRITION SERVICES    Reason for Assessment:  Heart-healthy nutrition education, received consult.    Diet History:  Pt reports no history of receiving heart-healthy nutrition education in the past.      Nutrition Diagnosis:  Food- and nutrition-related knowledge deficit r/t no previous knowledge of heart-healthy diet AEB pt report of no previous formal heart-healthy nutrition education.    Nutrition Prescription/Recs:  Continue heart-healthy diet.      Interventions:  Nutrition Education  1. Provided verbal instruction on a heart-healthy diet.  2. Provided handouts: Heart Health Guidelines (includes Heart Healthy Food Choices), How to Read Nutrition Labels.    Ensure Enlive daily d/t decreased appetite and patient using protein shakes at baseline      Goals:    1. Pt will verbalize at least four foods high in saturated or trans-fats and five foods high in sodium.    2. Pt will list at least two interventions to make current meal plan more heart-healthy.     Follow-up:   Patient to ask any further nutrition-related questions before discharge. In addition, pt may request outpatient RD appointment.      Janie Arana RDN, LD    6C (beds 3897-5297) + 7C (beds 3504-8104) + ED   RD pager: [phone]  Weekend/Holiday RD pager: [phone]

## 2023-11-20 NOTE — PLAN OF CARE
D: Pt is s/p CABG x2 from 11/16/23.    I: Monitored and assessed patient status; nursing cares provided.    A:   Today's Highlights/Changes:  Chest tubes and pacer wires removed.  Ropivacaine catheters removed.  Lasix 40 mg IV x1 given with good urine output  Loose BM's x3 this shift, bowel medications held in am, changed to prn     Neuro: A&Ox4, pleasant & cooperative, able to make needs known  Cardiac: SR, HR 70-90, BP stable  Respiratory: On RA, lungs clear, left lower lobe diminished; dyspnea on exertion although maintains sats >92% with activity. Cough present.  GI/: +BS, hyperactive, having loose stools--no bowel meds given this am  Diet/Appetite: Regular, fair appetite  Skin: See PCS Flowsheet for details, no new deficits noted.  Pain: 5-6/10 at chest incision site--relieved with tylenol, robaxin and oxycodone.  Labs/Lytes: Phos 2.5--replaced with re-check in am.  LDA's: L PIV, SL'd  Activity: Up with walker, gait belt, and SBA in halls; did stairs with therapy today.    P: Continue to monitor pt's post-op course. Contact CVTS for questions or concerns.    Millie Heart RN  Cardiology

## 2023-11-20 NOTE — PHARMACY-ADMISSION MEDICATION HISTORY
Pharmacist Admission Medication History    Admission medication history is complete. The information provided in this note is only as accurate as the sources available at the time of the update.    Information Source(s): Patient via in-person    Pertinent Information: Currently on Aspirin 162 mg po daily BUT  takes Aspirin 81 mg po daily at home.    Changes made to PTA medication list:  Added: None  Deleted: None  Changed: None    Medication Affordability:       Allergies reviewed with patient and updates made in EHR: no    Medication History Completed By: Salbador Omer RPH 11/20/2023 10:33 AM    PTA Med List   Medication Sig Last Dose    aspirin 81 MG EC tablet Take 1 tablet (81 mg) by mouth daily (Patient taking differently: Take 81 mg by mouth every morning) 11/15/2023    isosorbide mononitrate (IMDUR) 30 MG 24 hr tablet Take 1 tablet (30 mg) by mouth daily (Patient taking differently: Take 30 mg by mouth every morning) 11/16/2023    nitroGLYcerin (NITROSTAT) 0.4 MG sublingual tablet For chest pain place 1 tablet under the tongue every 5 minutes for 3 doses. If symptoms persist 5 minutes after 1st dose call 911. More than a month    rosuvastatin (CRESTOR) 40 MG tablet Take 1 tablet (40 mg) by mouth daily (Patient taking differently: Take 40 mg by mouth every morning) 11/16/2023    tamsulosin (FLOMAX) 0.4 MG capsule Take 1 capsule (0.4 mg) by mouth daily (Patient taking differently: Take 0.4 mg by mouth every morning) 11/16/2023

## 2023-11-20 NOTE — PLAN OF CARE
"Goal Outcome Evaluation:      Plan of Care Reviewed With: patient    Overall Patient Progress: improvingOverall Patient Progress: improving    Shift: 1900-0730    Pain: Chest incision site pain. Give PRN oxycodone x2 and Dilaudid x2.    Neuro: A&Ox4  Cardiac: SR. HR and BP stable.  Respiratory: Denies SOB, OLIVO. Sating on RA. Infrequent cough, PRN Robitussin and albuterol neb.        Diet/Appetite: Tolerating regular diet.   /GI: Voiding spontaneously urine output. BM x1, loose.       LDA's: Left forearm PIV saline lock.  Skin: No new deficit noted.    Activity: Stand by assist.     Changes during this shift: Pt has chest pain. Pt reported \"heart rubbing sound\" and can feel it. Paged CVTS. Provider at beside. Labs, EKG, and chest xray ordered.               "

## 2023-11-20 NOTE — PROGRESS NOTES
Cardiovascular Surgery Progress Note  11/20/2023         Assessment and Plan:     Aquiles Mckeon is a 73 year old male with PMH of severe multivessel CAD, prediabetes, HLD who underwent CABG x2, left Internal Mammary Artery [place], Endoscopic [place] of Left Greater Saphenous Vein on 11/16/23 by Dr. Mulvihill.     Cardiovascular:   Severe multivessel CAD s/p CABG x2  HLD/HTN  No arrhythmias overnight, HD stable, in NSR.  Most recent echo demonstrated preserved biventricular function  -  mg daily  - Rosuvastatin 40 mg daily  - BB: transition to Toprol 50 mg daily  - Diuresis as below  Chest tubes: removed 11/19  TPWs: Removed 11/19    Pulmonary:  Previous smoker  Extubated POD 0 to NC. Now saturating well on RA   - Pulm hygiene, IS, activity and deep breathing  - Mucinex  - PRN duonebs  - PRN robitussin     Neurology / MSK:  Acute post-operative pain  Hx Bell's Palsy residual left sided heaviness  Pain well controlled with current regimen:  - Acetaminophen, PO oxycodone PRN, , methocarbamol  - Pain team following, appreciate     / Renal / Fluid / Electrolytes:  BL creat ~ 0.9, most recent creatinine stable; adequate UOP.   FLUID STATUS: Pre-op weight 202 lbs, weight today 208 lbs ; I/O past 24 hours: -375 mL  - Diuresis: lasix 40 mg IV x1 and evaluate response  - Replete lytes per protocol  - Strict I/O, daily weights  - Avoid/limit nephrotoxins as able    GI / Nutrition:   - Tolerating regular diet  - Continue bowel regimen, no BM yet    Endocrine:  Stress induced hyperglycemia  Prediabetes   Hgb A1c 5.9%  - Initially managed on insulin drip postop, transitioned to sliding scale; goal BG <180 for optimal healing    Infectious Disease:  Stress induced leukocytosis  WBC 14.7 and trending down, afebrile, no signs or symptoms of infection  - Completed perioperative antibiotics  - Continue to monitor fever curve, CBC    Hematology:   Acute blood loss anemia and thrombocytopenia  Hgb stable; Plt stable no  signs or symptoms of active bleeding  - CBC in AM    Anticoagulation:   - ASA only    MSK/Skin:  Sternotomy  Surgical incision  - Sternal precautions  - Incisional cares per protocol  - CXR 11/20 with second most inferior wire pulled through  - Reinforce sternal precautions    Prophylaxis:   - Stress ulcer prophylaxis: Pantoprazole 40 mg daily  - DVT prophylaxis: Subcutaneous heparin, SCD    Disposition:   - Transferred to  on 11/17  - Therapies recommending discharge to home with OP CR when medically ready    Dr. Mulvihill has been informed of changes through both verbal and written communication.      DOUG Lim, ACNPC-AG, CCRN  Nurse Practitioner  Cardiothoracic Surgery  Pager: [phone]        Interval History:     No acute events overnight. States pain is well managed on current regimen, slept well. Breathing is stable on room air, loose cough, working with IS. Tolerating diet, is passing flatus, +BM, no n/v. Denies chest pain, palpitations, dizziness, syncopal symptoms, chills, myalgias, is having sternal popping/clicking and some instability at inferior portion of sternum.         Physical Exam:     Gen: A&Ox4, NAD  Neuro: Intact with no focal deficits   CV: RRR, normal S1 S2, no murmurs, rubs or gallops. no JVD  Pulm: CTA, no wheezing or rhonchi, normal breathing on RA  Abd: nondistended, normal BS, soft, nontender  Ext: no peripheral edema, no pitting  Incision: clean, dry, intact, no erythema, sternum stable  Tubes/drain sites: dressing clean and dry         Data:    Imaging:  reviewed recent imaging, no acute concerns    Recent Results (from the past 24 hour(s))   XR Chest Port 1 View    Narrative    EXAMINATION: XR CHEST PORT 1 VIEW, 11/19/2023 10:33 AM    COMPARISON: 11/17/2023    HISTORY: s/p CT removal    FINDINGS: Cardiac silhouette is enlarged and unchanged. Sternotomy  wires. Streaky basilar atelectasis. Right IJ catheter has been  removed. No significant pneumothorax. Small left  pleural effusion.  Left chest tube removed.      Impression    IMPRESSION: Small left pleural effusion following chest tube removal.       WASHINGTON KATHLEEN MD         SYSTEM ID:  [id number]   XR Chest Port 1 View    Narrative    Examination: XR CHEST PORT 1 VIEW 11/20/2023 5:20 AM    Indication: chest palpitations    Comparison: X-ray 11/19/2023    Findings:  2 AP portable chest radiographs. Median sternotomy wires and cerclage  wire appears intact. Trachea is midline. Stable cardiac silhouette.  Continued small left pleural effusion and mild streaky basilar  atelectasis. No significant right pleural effusion. No discernible  pneumothorax. No focal consolidation. No interval/new airspace  opacities. Unchanged osseous structures. Mildly prominent loops of  colon in a nonobstructive bowel gas pattern.      Impression    Impression:   1. Continued small left pleural effusion and mild streaky basilar  atelectasis. Otherwise, no significant interval change.    I have personally reviewed the examination and initial interpretation  and I agree with the findings.    GARLAND SPANGLER MD         SYSTEM ID:  [id number]          Labs:  Recent Labs   Lab 11/20/23  0833 11/20/23  0448 11/19/23  0643 11/18/23  0722 11/18/23  0552 11/17/23  0437 11/17/23  0429 11/16/23  1551 11/16/23  1459 11/16/23  1335 11/16/23  1313   WBC  --  14.7* 15.6*  --  17.6*  --  12.7*  --  20.1*   < >  --    HGB  --  10.7* 10.7*  --  11.6*  --  10.9*  --  10.9*   < >  --    MCV  --  93 90  --  96  --  90  --  93   < >  --    PLT  --  165 118*  --  123*  --  127*  --  140*  --  134*   INR  --   --   --   --   --   --   --   --  1.44*  --  1.68*   NA  --  137 134*  --  135   < > 137  --  141   < >  --    POTASSIUM  --  3.9 4.2  --  5.3   < > 4.7  --  4.7   < >  --    CHLORIDE  --  99 100  --  100   < > 104  --  105   < >  --    CO2  --  27 25  --  26   < > 25  --  24   < >  --    BUN  --  19.2 25.9*  --  25.1*   < > 19.5  --  13.5   < >  --    CR  --  0.92  1.03  --  1.31*   < > 1.16  --  0.88   < >  --    ANIONGAP  --  11 9  --  9   < > 8  --  12   < >  --    DANICA  --  8.4* 8.3*  --  8.8   < > 8.2*  --  8.2*   < >  --    * 122* 119*   < > 129*   < > 113*   < > 132*   < >  --    ALBUMIN  --   --   --   --  3.3*  --  3.3*  --  3.0*   < >  --    PROTTOTAL  --   --   --   --  5.8*  --  5.1*  --  4.5*   < >  --    BILITOTAL  --   --   --   --  0.7  --  0.8  --  0.6   < >  --    ALKPHOS  --   --   --   --  44  --  42  --  41   < >  --    ALT  --   --   --   --  32  --  34  --  28   < >  --    AST  --   --   --   --  61*  --  61*  --  39   < >  --     < > = values in this interval not displayed.      GLUCOSE:   Recent Labs   Lab 11/20/23  0833 11/20/23  0448 11/19/23  0643 11/19/23  0106 11/18/23  2211 11/18/23  1641   * 122* 119* 123* 134* 125*

## 2023-11-21 ENCOUNTER — APPOINTMENT (OUTPATIENT)
Dept: PHYSICAL THERAPY | Facility: CLINIC | Age: 73
DRG: 236 | End: 2023-11-21
Attending: STUDENT IN AN ORGANIZED HEALTH CARE EDUCATION/TRAINING PROGRAM
Payer: COMMERCIAL

## 2023-11-21 ENCOUNTER — APPOINTMENT (OUTPATIENT)
Dept: OCCUPATIONAL THERAPY | Facility: CLINIC | Age: 73
DRG: 236 | End: 2023-11-21
Attending: STUDENT IN AN ORGANIZED HEALTH CARE EDUCATION/TRAINING PROGRAM
Payer: COMMERCIAL

## 2023-11-21 VITALS
HEART RATE: 100 BPM | BODY MASS INDEX: 32.83 KG/M2 | OXYGEN SATURATION: 98 % | SYSTOLIC BLOOD PRESSURE: 137 MMHG | TEMPERATURE: 98.9 F | HEIGHT: 66 IN | RESPIRATION RATE: 22 BRPM | WEIGHT: 204.3 LBS | DIASTOLIC BLOOD PRESSURE: 84 MMHG

## 2023-11-21 LAB
ANION GAP SERPL CALCULATED.3IONS-SCNC: 11 MMOL/L (ref 7–15)
ATRIAL RATE - MUSE: 88 BPM
ATRIAL RATE - MUSE: 89 BPM
ATRIAL RATE - MUSE: 96 BPM
BUN SERPL-MCNC: 20.1 MG/DL (ref 8–23)
CALCIUM SERPL-MCNC: 8.6 MG/DL (ref 8.8–10.2)
CHLORIDE SERPL-SCNC: 98 MMOL/L (ref 98–107)
CREAT SERPL-MCNC: 0.91 MG/DL (ref 0.67–1.17)
DEPRECATED HCO3 PLAS-SCNC: 26 MMOL/L (ref 22–29)
DIASTOLIC BLOOD PRESSURE - MUSE: NORMAL MMHG
EGFRCR SERPLBLD CKD-EPI 2021: 90 ML/MIN/1.73M2
ERYTHROCYTE [DISTWIDTH] IN BLOOD BY AUTOMATED COUNT: 13.2 % (ref 10–15)
GLUCOSE BLDC GLUCOMTR-MCNC: 129 MG/DL (ref 70–99)
GLUCOSE SERPL-MCNC: 127 MG/DL (ref 70–99)
HCT VFR BLD AUTO: 32.7 % (ref 40–53)
HGB BLD-MCNC: 11 G/DL (ref 13.3–17.7)
INTERPRETATION ECG - MUSE: NORMAL
MAGNESIUM SERPL-MCNC: 2.2 MG/DL (ref 1.7–2.3)
MCH RBC QN AUTO: 30.6 PG (ref 26.5–33)
MCHC RBC AUTO-ENTMCNC: 33.6 G/DL (ref 31.5–36.5)
MCV RBC AUTO: 91 FL (ref 78–100)
P AXIS - MUSE: 37 DEGREES
P AXIS - MUSE: 62 DEGREES
P AXIS - MUSE: 67 DEGREES
PHOSPHATE SERPL-MCNC: 3.7 MG/DL (ref 2.5–4.5)
PLATELET # BLD AUTO: 205 10E3/UL (ref 150–450)
POTASSIUM SERPL-SCNC: 3.9 MMOL/L (ref 3.4–5.3)
PR INTERVAL - MUSE: 142 MS
PR INTERVAL - MUSE: 152 MS
PR INTERVAL - MUSE: 168 MS
QRS DURATION - MUSE: 80 MS
QRS DURATION - MUSE: 88 MS
QRS DURATION - MUSE: 90 MS
QT - MUSE: 348 MS
QT - MUSE: 350 MS
QT - MUSE: 366 MS
QTC - MUSE: 421 MS
QTC - MUSE: 442 MS
QTC - MUSE: 445 MS
R AXIS - MUSE: 14 DEGREES
R AXIS - MUSE: 33 DEGREES
R AXIS - MUSE: 52 DEGREES
RBC # BLD AUTO: 3.6 10E6/UL (ref 4.4–5.9)
SODIUM SERPL-SCNC: 135 MMOL/L (ref 135–145)
SYSTOLIC BLOOD PRESSURE - MUSE: NORMAL MMHG
T AXIS - MUSE: 16 DEGREES
T AXIS - MUSE: 16 DEGREES
T AXIS - MUSE: 45 DEGREES
VENTRICULAR RATE- MUSE: 88 BPM
VENTRICULAR RATE- MUSE: 89 BPM
VENTRICULAR RATE- MUSE: 96 BPM
WBC # BLD AUTO: 13.1 10E3/UL (ref 4–11)

## 2023-11-21 PROCEDURE — 250N000011 HC RX IP 250 OP 636: Performed by: STUDENT IN AN ORGANIZED HEALTH CARE EDUCATION/TRAINING PROGRAM

## 2023-11-21 PROCEDURE — 84100 ASSAY OF PHOSPHORUS: CPT | Performed by: STUDENT IN AN ORGANIZED HEALTH CARE EDUCATION/TRAINING PROGRAM

## 2023-11-21 PROCEDURE — 250N000013 HC RX MED GY IP 250 OP 250 PS 637: Performed by: NURSE PRACTITIONER

## 2023-11-21 PROCEDURE — 36415 COLL VENOUS BLD VENIPUNCTURE: CPT | Performed by: NURSE PRACTITIONER

## 2023-11-21 PROCEDURE — 999N000157 HC STATISTIC RCP TIME EA 10 MIN

## 2023-11-21 PROCEDURE — 250N000009 HC RX 250

## 2023-11-21 PROCEDURE — 250N000013 HC RX MED GY IP 250 OP 250 PS 637: Performed by: PHYSICIAN ASSISTANT

## 2023-11-21 PROCEDURE — 94640 AIRWAY INHALATION TREATMENT: CPT | Mod: 76

## 2023-11-21 PROCEDURE — 80048 BASIC METABOLIC PNL TOTAL CA: CPT | Performed by: NURSE PRACTITIONER

## 2023-11-21 PROCEDURE — 97116 GAIT TRAINING THERAPY: CPT | Mod: GP

## 2023-11-21 PROCEDURE — 250N000013 HC RX MED GY IP 250 OP 250 PS 637

## 2023-11-21 PROCEDURE — 94640 AIRWAY INHALATION TREATMENT: CPT

## 2023-11-21 PROCEDURE — 97530 THERAPEUTIC ACTIVITIES: CPT | Mod: GP

## 2023-11-21 PROCEDURE — 83735 ASSAY OF MAGNESIUM: CPT | Performed by: STUDENT IN AN ORGANIZED HEALTH CARE EDUCATION/TRAINING PROGRAM

## 2023-11-21 PROCEDURE — 97535 SELF CARE MNGMENT TRAINING: CPT | Mod: GO

## 2023-11-21 PROCEDURE — 85027 COMPLETE CBC AUTOMATED: CPT

## 2023-11-21 PROCEDURE — 36415 COLL VENOUS BLD VENIPUNCTURE: CPT | Performed by: STUDENT IN AN ORGANIZED HEALTH CARE EDUCATION/TRAINING PROGRAM

## 2023-11-21 PROCEDURE — 250N000011 HC RX IP 250 OP 636: Mod: JZ | Performed by: NURSE PRACTITIONER

## 2023-11-21 RX ORDER — FUROSEMIDE 10 MG/ML
40 INJECTION INTRAMUSCULAR; INTRAVENOUS ONCE
Status: COMPLETED | OUTPATIENT
Start: 2023-11-21 | End: 2023-11-21

## 2023-11-21 RX ORDER — OXYCODONE HYDROCHLORIDE 5 MG/1
5 TABLET ORAL EVERY 6 HOURS PRN
Qty: 15 TABLET | Refills: 0 | Status: SHIPPED | OUTPATIENT
Start: 2023-11-21 | End: 2023-12-11

## 2023-11-21 RX ORDER — METOPROLOL SUCCINATE 50 MG/1
75 TABLET, EXTENDED RELEASE ORAL DAILY
Qty: 90 TABLET | Refills: 1 | Status: SHIPPED | OUTPATIENT
Start: 2023-11-22 | End: 2023-11-21

## 2023-11-21 RX ORDER — GUAIFENESIN 600 MG/1
600 TABLET, EXTENDED RELEASE ORAL 2 TIMES DAILY
Qty: 14 TABLET | Refills: 0 | Status: SHIPPED | OUTPATIENT
Start: 2023-11-21 | End: 2023-11-28

## 2023-11-21 RX ORDER — ASPIRIN 81 MG/1
162 TABLET, CHEWABLE ORAL DAILY
Qty: 120 TABLET | Refills: 0 | Status: SHIPPED | OUTPATIENT
Start: 2023-11-21

## 2023-11-21 RX ORDER — AMOXICILLIN 250 MG
2 CAPSULE ORAL 2 TIMES DAILY PRN
Qty: 60 TABLET | Refills: 0 | Status: SHIPPED | OUTPATIENT
Start: 2023-11-21 | End: 2023-12-11

## 2023-11-21 RX ORDER — METHOCARBAMOL 750 MG/1
750 TABLET, FILM COATED ORAL EVERY 6 HOURS PRN
Qty: 90 TABLET | Refills: 0 | Status: SHIPPED | OUTPATIENT
Start: 2023-11-21

## 2023-11-21 RX ORDER — METOPROLOL SUCCINATE 50 MG/1
75 TABLET, EXTENDED RELEASE ORAL DAILY
Qty: 120 TABLET | Refills: 1 | Status: SHIPPED | OUTPATIENT
Start: 2023-11-22 | End: 2024-02-22

## 2023-11-21 RX ORDER — LEVALBUTEROL TARTRATE 45 UG/1
2 AEROSOL, METERED ORAL EVERY 4 HOURS PRN
Qty: 15 G | Refills: 1 | Status: SHIPPED | OUTPATIENT
Start: 2023-11-21 | End: 2023-12-11

## 2023-11-21 RX ORDER — ACETAMINOPHEN 500 MG
1000 TABLET ORAL EVERY 6 HOURS PRN
COMMUNITY
Start: 2023-11-21

## 2023-11-21 RX ORDER — POLYETHYLENE GLYCOL 3350 17 G/17G
17 POWDER, FOR SOLUTION ORAL DAILY PRN
Qty: 510 G | Refills: 0 | Status: SHIPPED | OUTPATIENT
Start: 2023-11-21 | End: 2023-12-11

## 2023-11-21 RX ORDER — GUAIFENESIN 200 MG/10ML
200 LIQUID ORAL EVERY 4 HOURS PRN
Qty: 600 ML | Refills: 0 | Status: SHIPPED | OUTPATIENT
Start: 2023-11-21 | End: 2023-12-11

## 2023-11-21 RX ADMIN — GUAIFENESIN 600 MG: 600 TABLET ORAL at 08:47

## 2023-11-21 RX ADMIN — HEPARIN SODIUM 5000 UNITS: 5000 INJECTION, SOLUTION INTRAVENOUS; SUBCUTANEOUS at 08:47

## 2023-11-21 RX ADMIN — ROSUVASTATIN CALCIUM 40 MG: 20 TABLET, FILM COATED ORAL at 08:47

## 2023-11-21 RX ADMIN — GUAIFENESIN 200 MG: 200 SOLUTION ORAL at 00:42

## 2023-11-21 RX ADMIN — OXYCODONE HYDROCHLORIDE 10 MG: 10 TABLET ORAL at 00:10

## 2023-11-21 RX ADMIN — METOPROLOL SUCCINATE 75 MG: 25 TABLET, EXTENDED RELEASE ORAL at 09:40

## 2023-11-21 RX ADMIN — PANTOPRAZOLE SODIUM 40 MG: 40 TABLET, DELAYED RELEASE ORAL at 08:47

## 2023-11-21 RX ADMIN — FUROSEMIDE 40 MG: 10 INJECTION, SOLUTION INTRAVENOUS at 08:47

## 2023-11-21 RX ADMIN — TAMSULOSIN HYDROCHLORIDE 0.4 MG: 0.4 CAPSULE ORAL at 08:47

## 2023-11-21 RX ADMIN — POLYETHYLENE GLYCOL 3350 17 G: 17 POWDER, FOR SOLUTION ORAL at 08:47

## 2023-11-21 RX ADMIN — SIMETHICONE 80 MG: 80 TABLET, CHEWABLE ORAL at 12:41

## 2023-11-21 RX ADMIN — METHOCARBAMOL TABLETS 750 MG: 750 TABLET, COATED ORAL at 12:41

## 2023-11-21 RX ADMIN — ASPIRIN 81 MG CHEWABLE TABLET 162 MG: 81 TABLET CHEWABLE at 08:47

## 2023-11-21 RX ADMIN — IPRATROPIUM BROMIDE AND ALBUTEROL SULFATE 3 ML: .5; 3 SOLUTION RESPIRATORY (INHALATION) at 00:56

## 2023-11-21 RX ADMIN — OXYCODONE HYDROCHLORIDE 5 MG: 5 TABLET ORAL at 06:32

## 2023-11-21 RX ADMIN — SIMETHICONE 80 MG: 80 TABLET, CHEWABLE ORAL at 08:47

## 2023-11-21 RX ADMIN — METHOCARBAMOL TABLETS 750 MG: 750 TABLET, COATED ORAL at 06:32

## 2023-11-21 ASSESSMENT — ACTIVITIES OF DAILY LIVING (ADL)
ADLS_ACUITY_SCORE: 23

## 2023-11-21 NOTE — PROGRESS NOTES
"[id number]    NEURO: A&O x4; calls appropriately; able to make needs known   RESPIRATORY: Room air; sats >94%; denies SOB; OLIVO  CARDIAC: ST; -110  GI/: Voiding adequately; smear BM this shift   SKIN: Midsternal incision; harvest sites   PAIN: Controlled with scheduled medications   TESTS/PROCEDURES: None  LAB: No electrolyte replacements needed;   MOBILITY: SBA; monitor sternal precautions   DIET: Regular   LDAs: PIV x1 SL, to be removed prior to discharge     PLAN: Plan to discharge home this afternoon; Continue POC; notify the primary team with any concerns/updates.       Intake/Output Summary (Last 24 hours) at 11/21/2023 1402  Last data filed at 11/21/2023 1200  Gross per 24 hour   Intake 1020 ml   Output 2660 ml   Net -1640 ml       /84 (BP Location: Left arm)   Pulse 100   Temp 98.9  F (37.2  C) (Oral)   Resp 22   Ht 1.664 m (5' 5.5\")   Wt 92.7 kg (204 lb 4.8 oz)   SpO2 98%   BMI 33.48 kg/m        "

## 2023-11-21 NOTE — PLAN OF CARE
D: Post CABG x2    I: Monitored vitals and assessed pt status. Encouraged activity.      Events during shift:  - wheezy after activity overnight; received robitussin and neb     A: Neuro: A&O x 4. Call light appropriate.   Cardiac: ST/SR. VSS.   Resp: Room air. Wheezy after activity overnight. Received robitussin x1 to loosen secretions and a prn neb x1 to help with wheezing.   GI/: Regular diet. No BM overnight but passing gas. Up to bathroom as needed.  Skin: See PCS for assessment details.  Lines/Drains: L PIV, saline locked.   Activity: Up SBA. Tolerating well.    Pain: 5-6/10 in midsternal incision. Received Oxy PRN x2.   Sleep: Pt appeared asleep between cares.     P: Continue to monitor pt status and report changes to treatment team.

## 2023-11-21 NOTE — DISCHARGE INSTRUCTIONS
AFTER YOU GO HOME FROM YOUR HEART SURGERY  (Coronary artery bypass grafting x2 with Dr. Mulvihill)    You had a sternotomy, avoid lifting anything greater than ten pounds for 8 weeks after surgery and then less than 20 pounds for an additional 4 weeks.   Do not reach backwards or use arms to push out of chair.   Do not let people pull on your arms to assist with standing.   Avoid twisting or reaching too far across your body.    Avoid strenuous activities such as bowling, vacuuming, raking, shoveling, golf or tennis for 12 weeks after your surgery.   It is okay to resume sex if you feel comfortable in doing so. You may have to try different positions with your partner.    Splint your chest incision by hugging a pillow or bringing your arms across your chest when coughing or sneezing.     No driving for 4 weeks after surgery or while on pain medication.    Shower or wash your incisions daily with soap and water (or as instructed), pat dry.   Keep wound clean and dry, showers are okay after discharge, but don't let spray hit directly on incision.   No baths or swimming for 1 month.   Cover chest tube sites with dry gauze until they stop draining, then leave open to air. It is not abnormal for chest tube sites to drain yellowish/clear fluid for up to 2-3 weeks after surgery.   Watch for signs of infection: increased redness, tenderness, warmth or any drainage that appears infected (pus like) or is persistent.  Also a temperature > 100.5 F or chills. Call your surgeon or primary care provider's office immediately.   Remove any skin glue left on incisions after 10-14 days. This will not affect your incision and can speed up healing.    Exercise is very important in your recovery. Please follow the guidelines set up for you in your cardiac rehab classes at the hospital. If outpatient cardiac rehab was ordered for you, we highly recommend you participate. If you have problems arranging your cardiac rehab, please call  [phone] for all locations, with the exception of [place], please call [phone] and [place], please call [phone].    Avoid sitting for prolonged periods of time, try to walk every hour during the day. If you have a leg incision, elevate your leg often when you are not walking.    Check your weight when you get home from the hospital and continue to check it daily through your recovery for at least a month. If you notice a weight gain of 2-3 pounds in a week, notify your primary care physician, cardiologist or surgeon.    Bowel activity may be slow after surgery. If necessary, you may take an over the counter laxative such as Milk of Magnesia or Miralax. You may have stool softeners prescribed (docusate sodium, Senokot). We recommend using stool softeners while using narcotics for pain (oxycodone/percocet, hydrocodone/vicodin, hydromorphone/dilaudid).      Wean OFF of narcotics (oxycodone, dilaudid, hydrocodone) as soon as possible. You should continue taking acetaminophen as long as you have any surgical pain as the first choice for pain control and add narcotics as necessary for pain to be tolerable.      DO NOT SMOKE.  IF YOU NEED HELP QUITTING, PLEASE TALK WITH YOUR CARDIOLOGIST OR PRIMARY DOCTOR.    REGARDING PRESCRIPTION REFILLS.  If you need a refill on your pain medication contact us to discuss your pain and a possible one time refill.   All other medications will be adjusted, discontinued and re-filled by your primary care physician and/or your cardiologist as they were prior to your surgery. We have given you enough for one to three month with possibly one refill.    POST-OPERATIVE CLINIC VISITS  You have a follow up visit with [facility] Surgery Advance Care Practitioners at the [facility].  You will then return to the care of your primary provider and your cardiologist. Future medication refills should come from your PCP or Cardiologist.   You should see your primary care  provider in 2-4 weeks after discharge.   It is important to see your cardiologist about 4-6 weeks after discharge.    If you do not hear from a  in 7 days, please call [phone] (choose option 1) and request to be seen with a general cardiologist or someone that you have seen in the past.   If there is a need to return to see CT Surgery please call our  at [phone].    SURGICAL QUESTIONS  Please call Apolinar Reyes, Alexandra Longoria, Staci Reyes, Guillermina Ayala or Millie Ferrari with surgical recovery and medication questions, their phone numbers are listed below.  They will assist you with your needs and contact other surgery care team members as indicated.    On weekends or after hours, please call [phone] and ask the  to page the Cardiothoracic Surgery fellow on call.      Thank you,    Your Cardiothoracic Surgery Team   Apolinar Reyes RN Care Coordinator - [phone]  Alexandra Longoria RN Care Coordinator - [phone]   Millie Ferrari, RN Care Coordinator - [phone]   Guillermina Ayala RN Care Coordinator - [phone]  Staci Reyes, RN Care Coordinator - [phone]

## 2023-11-21 NOTE — DISCHARGE SUMMARY
[facility], [place]   Cardiothoracic Surgery Hospital Discharge Summary     Aquiles Mckeon MRN# [id number]   Age: 73 year old YOB: 1950     Admitting Physician:  Michael Mulvihill, MD  Discharge Physician:  Juliet Mulligan NP  Primary Care Physician:        Lyubov Almanza     DATE OF ADMISSION: 11/16/2023      DATE OF DISCHARGE: November 21, 2023     Admit Wt: 202 lbs  Discharge Wt: 204 lbs          Primary Diagnoses:   Multivessel CAD s/p CABG x2          Secondary Diagnoses:   Acute postoperative pain, improving  Stress induced hyperglycemia, resolved  Stress induced leukocytosis, resolved  Acute blood loss anemia, stable  Acute blood loss thrombocytopenia, resolved  Hypervolemia, resolved    PROCEDURES PERFORMED:   Date: 11/16/23.  Surgeon: Dr. Michael Mulvihill  1. CABG x2 (LIMA-LAD, v-OM)  2. Endoscopic Vein [place]  3. Epiaortic Echo     INTRAOPERATIVE FINDINGS:    Findings of Procedure:  severe CAD    GRAFT MATRIX:  Distal: Graft Source: Target Quality: Conduit Quality: Distal Suture: Proximal Suture:  LAD                   BECK                  Good                  Good                  7-0                     N/A  OM                     RSVG                Good                  Good                  7-0                     6-0    PATHOLOGY RESULTS:    none     CULTURE RESULTS:    none    CONSULTS:    PT/OT  Intensivist    BRIEF HISTORY OF ILLNESS:  Aquiles Mckeon is a 73 year old male with PMH of severe multivessel CAD, prediabetes, HLD who underwent CABG x2, left Internal Mammary Artery [place], Endoscopic [place] of Left Greater Saphenous Vein on 11/16/23 by Dr. Mulvihill.     HOSPITAL COURSE: Aquiles Mckeon is a 73 year old male who on 11/16/2023 underwent the above-named procedures and tolerated the operation well.     Postoperatively was admitted to the CVICU.  Patient was extubated within protocol on POD #0.  Blood pressure and cardiac index were  "managed with vasopressors and inotropic agents which were continuously weaned until no longer needed.  Patient was subsequently  transferred to the surgical telemetry floor.    While on the surgical unit, the patient continued to progress well. Chest tubes and temporary pacemaker wires were removed when deemed appropriate.     Patient was fluid overloaded and treated with diuretics. They remain up about 2lbs from preoperative weight and will not discharge with diuretic therapy; will re-evaluate need in clinic follow-up.      Patient was transiently hyperglycemic and treated with insulin infusion then transitioned to sliding scale insulin per protocol. Blood sugars remained stable. No further glycemic control agents needed at this time.    Jcarlos has a long history of smoking though he has since quit. He is discharging with PRN levalbuterol inhaler and a pulmonary referral was placed.     Prior to discharge, his pain was controlled well, he was working well with therapies, able to perform most ADLs, ambulate without difficulty, and had full return of bowel and bladder function.  On November 21, 2023, he was discharged to home in stable condition. Follow up with cardiology and cardiac surgery have been arranged. Pt encouraged to follow up with PCP and cardiac rehab upon discharge.    Patient discharged on aspirin:  Yes 162 mg  Patient discharged on beta blocker: yes    Patient discharged on ACE Inhibitor/ARB: no      Patient discharged on statin: yes          Discharge Disposition:     Discharged to home            Condition on Discharge:     Discharge condition: Stable   Discharge vitals: Blood pressure 137/84, pulse 100, temperature 98.9  F (37.2  C), temperature source Oral, resp. rate 22, height 1.664 m (5' 5.5\"), weight 92.7 kg (204 lb 4.8 oz), SpO2 98%.   Code status on discharge: Full Code     Vitals:    11/19/23 0437 11/20/23 0509 11/21/23 0400   Weight: 95.9 kg (211 lb 8 oz) 94.5 kg (208 lb 6.4 oz) 92.7 kg " (204 lb 4.8 oz)       DAY OF DISCHARGE PHYSICAL EXAM:    Gen: A&Ox4, NAD  Neuro: Intact with no focal deficits   CV: RRR, normal S1 S2, no murmurs, rubs or gallops. no JVD  Pulm: CTA, no wheezing or rhonchi, normal breathing on RA  Abd: nondistended, normal BS, soft, nontender  Ext: no peripheral edema, no pitting  Incision: clean, dry, intact, no erythema, sternum stable  Tubes/drain sites: dressing clean and dry    LABS  Most Recent 3 CBC's:  Recent Labs   Lab Test 23  0535 23  0448 23  0643   WBC 13.1* 14.7* 15.6*   HGB 11.0* 10.7* 10.7*   MCV 91 93 90    165 118*      Most Recent 3 BMP's:  Recent Labs   Lab Test 23  0750 23  0535 23  0833 23  0448 23  0643   NA  --  135  --  137 134*   POTASSIUM  --  3.9  --  3.9 4.2   CHLORIDE  --  98  --  99 100   CO2  --  26  --  27 25   BUN  --  20.1  --  19.2 25.9*   CR  --  0.91  --  0.92 1.03   ANIONGAP  --  11  --  11 9   DANICA  --  8.6*  --  8.4* 8.3*   * 127* 129* 122* 119*     Most Recent 2 LFT's:  Recent Labs   Lab Test 23  0552 23  0429   AST 61* 61*   ALT 32 34   ALKPHOS 44 42   BILITOTAL 0.7 0.8     Most Recent INR's and Anticoagulation Dosing History:  Anticoagulation Dose History          Latest Ref Rng & Units 2023   Recent Dosing and Labs   INR 0.85 - 1.15 1.12  1.44  1.68      Most Recent 3 Troponin's:No lab results found.  Most Recent Cholesterol Panel:  Recent Labs   Lab Test 23  1115   CHOL 198   LDL 98   HDL 60   TRIG 202*     Most Recent 6 Bacteria Isolates From Any Culture (See EPIC Reports for Culture Details):No lab results found.  Most Recent TSH, T4 and A1c Labs:  Recent Labs   Lab Test 23  1103   TSH 0.40   A1C 5.9*      Recent Labs   Lab 23  0750 23  0535 23  0833 23  0448 23  0643 23  0106   * 127* 129* 122* 119* 123*       Imagin23 CXR:  FINDINGS:   Unchanged prominent cardiac silhouette.  Sternotomy wires and  mediastinal surgical clips. Small left and trace right pleural  effusions. Streaky opacities at the left greater than right bases, and  along the oblique fissure. No significant pneumothorax. Soft Tissues  are unremarkable. Gaseous distention of the bowel in the upper  abdomen.                                                                      IMPRESSION:   Small left and trace right pleural effusions with adjacent  atelectasis.      PRE-ADMISSION MEDICATIONS:  Medications Prior to Admission   Medication Sig Dispense Refill Last Dose    nitroGLYcerin (NITROSTAT) 0.4 MG sublingual tablet For chest pain place 1 tablet under the tongue every 5 minutes for 3 doses. If symptoms persist 5 minutes after 1st dose call 911. 25 tablet 3 More than a month    rosuvastatin (CRESTOR) 40 MG tablet Take 1 tablet (40 mg) by mouth daily (Patient taking differently: Take 40 mg by mouth every morning) 90 tablet 3 11/16/2023    tamsulosin (FLOMAX) 0.4 MG capsule Take 1 capsule (0.4 mg) by mouth daily (Patient taking differently: Take 0.4 mg by mouth every morning) 90 capsule 3 11/16/2023    [DISCONTINUED] aspirin 81 MG EC tablet Take 1 tablet (81 mg) by mouth daily (Patient taking differently: Take 81 mg by mouth every morning)   11/15/2023    [DISCONTINUED] isosorbide mononitrate (IMDUR) 30 MG 24 hr tablet Take 1 tablet (30 mg) by mouth daily (Patient taking differently: Take 30 mg by mouth every morning) 90 tablet 3 11/16/2023       DISCHARGE MEDICATIONS:   Current Discharge Medication List        START taking these medications    Details   acetaminophen (TYLENOL) 500 MG tablet Take 2 tablets (1,000 mg) by mouth every 6 hours as needed for other (For optimal non-opioid multimodal pain management to improve pain control.)    Associated Diagnoses: S/P CABG (coronary artery bypass graft)      aspirin (ASA) 81 MG chewable tablet Take 2 tablets (162 mg) by mouth daily  Qty: 120 tablet, Refills: 0    Associated  Diagnoses: S/P CABG (coronary artery bypass graft)      guaiFENesin (MUCINEX) 600 MG 12 hr tablet Take 1 tablet (600 mg) by mouth 2 times daily for 7 days  Qty: 14 tablet, Refills: 0    Associated Diagnoses: S/P CABG (coronary artery bypass graft)      guaiFENesin (ROBITUSSIN) 20 mg/mL liquid Take 10 mLs (200 mg) by mouth every 4 hours as needed for cough  Qty: 600 mL, Refills: 0    Associated Diagnoses: S/P CABG (coronary artery bypass graft)      levalbuterol (XOPENEX HFA) 45 MCG/ACT inhaler Inhale 2 puffs into the lungs every 4 hours as needed for shortness of breath or wheezing  Qty: 15 g, Refills: 1    Associated Diagnoses: S/P CABG (coronary artery bypass graft)      methocarbamol (ROBAXIN) 750 MG tablet Take 1 tablet (750 mg) by mouth every 6 hours as needed for muscle spasms or other (pain)  Qty: 90 tablet, Refills: 0    Associated Diagnoses: S/P CABG (coronary artery bypass graft)      metoprolol succinate ER (TOPROL XL) 50 MG 24 hr tablet Take 1.5 tablets (75 mg) by mouth daily  Qty: 120 tablet, Refills: 1    Associated Diagnoses: S/P CABG (coronary artery bypass graft)      oxyCODONE (ROXICODONE) 5 MG tablet Take 1 tablet (5 mg) by mouth every 6 hours as needed for severe pain  Qty: 15 tablet, Refills: 0    Associated Diagnoses: S/P CABG (coronary artery bypass graft)      polyethylene glycol (MIRALAX) 17 GM/Dose powder Take 17 g by mouth daily as needed for constipation  Qty: 510 g, Refills: 0    Associated Diagnoses: S/P CABG (coronary artery bypass graft)      senna-docusate (SENOKOT-S/PERICOLACE) 8.6-50 MG tablet Take 2 tablets by mouth 2 times daily as needed for constipation  Qty: 60 tablet, Refills: 0    Associated Diagnoses: S/P CABG (coronary artery bypass graft)           CONTINUE these medications which have NOT CHANGED    Details   nitroGLYcerin (NITROSTAT) 0.4 MG sublingual tablet For chest pain place 1 tablet under the tongue every 5 minutes for 3 doses. If symptoms persist 5 minutes after  1st dose call 911.  Qty: 25 tablet, Refills: 3    Associated Diagnoses: Abnormal cardiovascular stress test      rosuvastatin (CRESTOR) 40 MG tablet Take 1 tablet (40 mg) by mouth daily  Qty: 90 tablet, Refills: 3    Associated Diagnoses: Abnormal cardiovascular stress test      tamsulosin (FLOMAX) 0.4 MG capsule Take 1 capsule (0.4 mg) by mouth daily  Qty: 90 capsule, Refills: 3    Associated Diagnoses: Lower urinary tract symptoms (LUTS)           STOP taking these medications       aspirin 81 MG EC tablet Comments:   Reason for Stopping:         isosorbide mononitrate (IMDUR) 30 MG 24 hr tablet Comments:   Reason for Stopping:                CC:Lyubov Sparks      [facility]   Cardiothoracic Surgery  Office phone: [phone]  Office fax: [phone]

## 2023-11-21 NOTE — PROGRESS NOTES
DISCHARGE     Discharged to: Home  Via: Automobile  Accompanied by: Family  Discharge Instructions: Diet, activity, medications, follow up appointments, when to call the MD, and what to watchout for (i.e. s/s of infection, increasing SOB, palpitations, chest pain,)  Prescriptions: To be filled by Discharge Pharmacy per patient's request; medication list reviewed & sent with patient  Follow Up Appointments: Arranged; information given  Belongings: All sent with patient  IV: Out  Telemetry: Off  Patient exhibits understanding of above discharge instructions; all questions answered.  Discharge Paperwork: Faxed

## 2023-11-22 ENCOUNTER — TELEPHONE (OUTPATIENT)
Dept: CARDIOLOGY | Facility: CLINIC | Age: 73
End: 2023-11-22
Payer: COMMERCIAL

## 2023-11-22 ENCOUNTER — PATIENT OUTREACH (OUTPATIENT)
Dept: CARE COORDINATION | Facility: CLINIC | Age: 73
End: 2023-11-22
Payer: COMMERCIAL

## 2023-11-22 NOTE — PLAN OF CARE
Occupational Therapy and Cardiac Rehab Discharge Summary    Reason for therapy discharge:    Discharged to home with outpatient therapy.    Progress towards therapy goal(s). See goals on Care Plan in [facility] electronic health record for goal details.  Goals partially met.  Barriers to achieving goals:   discharge from facility.    Therapy recommendation(s):    Continued therapy is recommended.  Rationale/Recommendations:  Home with A and OP CR.

## 2023-11-22 NOTE — PROGRESS NOTES
Clinic Care Coordination Contact  [facility]: Post-Discharge Note  SITUATION                                                      Admission:    Admission Date: 11/16/23   Reason for Admission: Multivessel CAD s/p CABG x2  Discharge:   Discharge Date: 11/21/23  Discharge Diagnosis: Multivessel CAD s/p CABG x2    BACKGROUND                                                      Per hospital discharge summary and inpatient provider notes:    BRIEF HISTORY OF ILLNESS:  Aquiles Mckeon is a 73 year old male with PMH of severe multivessel CAD, prediabetes, HLD who underwent CABG x2, left Internal Mammary Artery Eunice, Endoscopic Eunice of Left Greater Saphenous Vein on 11/16/23 by Dr. Mulvihill.      HOSPITAL COURSE: Aquiles Mckeon is a 73 year old male who on 11/16/2023 underwent the above-named procedures and tolerated the operation well.      Postoperatively was admitted to the CVICU.  Patient was extubated within protocol on POD #0.  Blood pressure and cardiac index were managed with vasopressors and inotropic agents which were continuously weaned until no longer needed.  Patient was subsequently  transferred to the surgical telemetry floor.     While on the surgical unit, the patient continued to progress well. Chest tubes and temporary pacemaker wires were removed when deemed appropriate.     Patient was fluid overloaded and treated with diuretics. They remain up about 2lbs from preoperative weight and will not discharge with diuretic therapy; will re-evaluate need in clinic follow-up.      Patient was transiently hyperglycemic and treated with insulin infusion then transitioned to sliding scale insulin per protocol. Blood sugars remained stable. No further glycemic control agents needed at this time.     Jcarlos has a long history of smoking though he has since quit. He is discharging with PRN levalbuterol inhaler and a pulmonary referral was placed.     Prior to discharge, his pain was controlled well, he  was working well with therapies, able to perform most ADLs, ambulate without difficulty, and had full return of bowel and bladder function.  On November 21, 2023, he was discharged to home in stable condition. Follow up with cardiology and cardiac surgery have been arranged. Pt encouraged to follow up with PCP and cardiac rehab upon discharge.     Patient discharged on aspirin:  Yes 162 mg  Patient discharged on beta blocker: yes    Patient discharged on ACE Inhibitor/ARB: no      Patient discharged on statin: yes     ASSESSMENT           Discharge Assessment  How are you doing now that you are home?: per patient report, he is doing OK. he said he has some crud in his lungs. he said it hurts to cough it up with his recent surgery. pt has follow up appts with cardiology and PCP. he said he has all his medications and appointments set  How are your symptoms? (Red Flag symptoms escalate to triage hotline per guidelines): Improved  Do you feel your condition is stable enough to be safe at home until your provider visit?: Yes  Does the patient have their discharge instructions? : Yes  Does the patient have questions regarding their discharge instructions? : No  Were you started on any new medications or were there changes to any of your previous medications? : Yes  Does the patient have all of their medications?: Yes  Do you have questions regarding any of your medications? : No  Do you have all of your needed medical supplies or equipment (DME)?  (i.e. oxygen tank, CPAP, cane, etc.): Yes  Discharge follow-up appointment scheduled within 14 calendar days? : Yes  Discharge Follow Up Appointment Date: 11/28/23  Discharge Follow Up Appointment Scheduled with?: Specialty Care Provider         Post-op (Clinicians Only)  Did the patient have surgery or a procedure: No  Fever: No  Chills: No  Eating & Drinking: eating and drinking without complaints/concerns  PO Intake: regular diet  Bowel Function: normal  Urinary Status:  "voiding without complaint/concerns      PLAN                                                      Outpatient Plan:        Discharge Disposition:      Discharged to home            Condition on Discharge:      Discharge condition: Stable   Discharge vitals: Blood pressure 137/84, pulse 100, temperature 98.9  F (37.2  C), temperature source Oral, resp. rate 22, height 1.664 m (5' 5.5\"), weight 92.7 kg (204 lb 4.8 oz), SpO2 98%.   Code status on discharge: Full Code            Vitals:     11/19/23 0437 11/20/23 0509 11/21/23 0400   Weight: 95.9 kg (211 lb 8 oz) 94.5 kg (208 lb 6.4 oz) 92.7 kg (204 lb 4.8 oz)        Future Appointments   Date Time Provider Department Center   11/28/2023 10:00 AM Virginia Kemp PA-C [facility] [place]   12/8/2023  2:00 PM [facility]   12/11/2023 11:00 AM [facility]ub[facility] Al[facility]C [facility]         For any urgent concerns, please contact our 24 hour nurse triage line: [phone] ([phone])       Patient declines care coordination at this time.     Angelica Urban RN    "

## 2023-11-22 NOTE — TELEPHONE ENCOUNTER
Patient and wife called regarding obtaining a blood pressure cuff. Advised to go to [employer] and buy one. Educated on taking blood pressure in the morning before medications and record readings. Blood pressure may be higher before medications. If blood pressure high take again 1-2 hours after medications. If still high reach out please call RNCC. Also educated on calling RNCC if SBP is lower than 100 before medications. Verbalized understanding.

## 2023-11-22 NOTE — PROGRESS NOTES
Physical Therapy Discharge Summary    Reason for therapy discharge:    Discharged to home with outpatient therapy.  All goals and outcomes met, no further needs identified.    Progress towards therapy goal(s). See goals on Care Plan in [facility] electronic health record for goal details.  Goals met    Therapy recommendation(s):    Continued therapy is recommended.  Rationale/Recommendations:  progress functional I and tolerance.

## 2023-11-24 ENCOUNTER — TELEPHONE (OUTPATIENT)
Dept: CARDIOLOGY | Facility: CLINIC | Age: 73
End: 2023-11-24
Payer: COMMERCIAL

## 2023-11-24 NOTE — TELEPHONE ENCOUNTER
CARDIOTHORACIC SURGERY  Discharge Follow Up Phone Call    POST OP MONITORING  How is your pain on a 0-10 scale, how are you managing your pain? 5/10 currently taking robaxin, oxy, and tylenol. Encouraged patient to wean oxy as able. Encouraged heat and ice for pain control    ACTIVITY  How is your activity tolerance? Going well, moving around the house and going on walks in the house multiple times a day.  Are you still doing sternal precautions? Yes  Do you hear any clicking when you are moving or taking a deep breath? No  Are you using your incentive spirometer? Yes  Are you weighing yourself daily? Yes, weight is stable.     SIGNS AND SYMPTOMS OF INFECTION  INCREASE IN PAIN - No  FEVER - No  DRAINAGE - no   REDNESS - no  SWELLING - no    Instructed patient on proper wound care. Cleaning all incisions at least once daily with fresh clean washcloth and a gentle liquid soap. Gentle pressure on the incision and no rubbing. Rinse and dry once done. Should look daily in the mirror for signs and symptoms of infection.      ASSISTANCE  Do you have someone at home to assist you with your daily activities? Yes    MEDICATIONS  Is someone helping you to set up your medications? Yes  Do you have any questions about your medications? No    FOLLOW UP  You are scheduled to see your primary care physician on 12/11.  You are scheduled to see our surgery advanced practive provider for post operative follow up on 12/8.    You are scheduled for cardiac rehab on 12/6.  You are scheduled to see your cardiologist on 11/28.    CONTACT INFORMATION  Please feel free to call us with any questions or symptoms that are concerning for you at [phone]. If it is after 4:30 in the afternoon, or a weekend please call [phone] and ask for the on call specialist. We want to do everything we can to help prevent you needing to return to the ED, so please do not hesitate to call us.    Alexandra Longoria, RNCC  Cardiothoracic  Surgery  [phone]

## 2023-11-24 NOTE — PROGRESS NOTES
[facility]   Cardiology Clinic Note      HPI:   Mr. Aquiles Mckeon is a pleasant 73 year old male with medical history pertinent for severe multivessel CAD, prediabetes, HTN, and HLD who underwent CABG x2 (LIMA to LAD, SVG to OM)  on 11/16/23 by Dr. Mulvihill . He presents to cardiology clinic for post-cath follow up.    Jcarlos originally presented for OP cath due to abnormal coronary CTA (Total Agatston calcium score: 4297), exertional dyspnea and atypical chest pain who presents to define his coronary anatomy. There he was found to have severe multivessel disease and subsequently underwent CABG x2 on 11/16/23. Postoperatively, patient recovered well. He at one point was fluid overloaded and treated with diuretics. He remained up about 2lbs from preoperative weight and did not discharge with diuretic therapy;  Prior to discharge, his pain was controlled well, he was working well with therapies, able to perform most ADLs, ambulate without difficulty, and had full return of bowel and bladder function.  On November 21, 2023, he was discharged to home in stable condition. Pt encouraged to follow up with cardiac rehab upon discharge and initial eval scheduled for 12/6/23.     Patient reports he began weaning his oxycodone on Saturday (11/25). He is still taking Robaxin and Tylenol. He reports at this time, he noticed that the pain in his sides that he first noticed prior to surgery had returned. He reports it is not responsive to nitroglycerin, is not palpable, does not change with deep breathing, and is not associated with any activity. It can last anywhere from 5-30min. He notes he has also noticed more shortness of breath since surgery. This dyspnea is temporarily relieved with coughing up phlegm, which he has a hard time doing due to sternal pain. He is consistently using his incentive spirometer at home. He reports Robitussin has been helping with his dyspnea and coughing. Has not used nitroglycerin since  surgery.    Due to return of pain in his sides, patient presented to ED yesterday () and had CXR and CT PE. CXR showing atelectasis in LLL, CT negative for PE and showing  Small to moderate left layering pleural effusion. No pneumothorax. No right effusion. Mild left basilar atelectasis. And the increased area of bandlike atelectasis along the left major fissure in the lingula. His weight has been consistently in upper 180s at home without large fluctuations.    He reports he has been able to walk around his home without issues and can comfortably complete the 14 stairs to his bedroom.     Today in clinic, he denies palpitations, dizziness, syncope, or lower extremity edema.       PAST MEDICAL HISTORY:  Past Medical History:   Diagnosis Date    ED (erectile dysfunction)     Hx of colonic polyp 2001       FAMILY HISTORY:  Family History   Problem Relation Age of Onset    Cervical Cancer Mother     Lung Cancer Mother     Heart Disease Father         CHF    Emphysema Father     Coronary Artery Disease Father         MI in 50s    Diabetes No family hx of     Hypertension No family hx of     Breast Cancer No family hx of     Prostate Cancer No family hx of     Mental Illness No family hx of     Osteoporosis No family hx of     Obesity No family hx of        SOCIAL HISTORY:  Social History     Socioeconomic History    Marital status:    Tobacco Use    Smoking status: Former     Packs/day: 0.80     Years: 40.00     Additional pack years: 0.00     Total pack years: 32.00     Types: Cigarettes     Quit date: 2015     Years since quittin.5     Passive exposure: Never    Smokeless tobacco: Never    Tobacco comments:     quit smoking 2015   Vaping Use    Vaping Use: Never used   Substance and Sexual Activity    Alcohol use: Yes     Alcohol/week: 10.0 standard drinks of alcohol     Types: 10 Standard drinks or equivalent per week    Drug use: No    Sexual activity: Not Currently     Partners:  Female     Birth control/protection: Male Surgical     Comment: Vasectomy   Other Topics Concern    Parent/sibling w/ CABG, MI or angioplasty before 65F 55M? No     Social Determinants of Health     Interpersonal Safety: Low Risk  (9/20/2023)    Interpersonal Safety     Do you feel physically and emotionally safe where you currently live?: Yes     Within the past 12 months, have you been hit, slapped, kicked or otherwise physically hurt by someone?: No     Within the past 12 months, have you been humiliated or emotionally abused in other ways by your partner or ex-partner?: No       CURRENT MEDICATIONS:  acetaminophen (TYLENOL) 500 MG tablet, Take 2 tablets (1,000 mg) by mouth every 6 hours as needed for other (For optimal non-opioid multimodal pain management to improve pain control.)  aspirin (ASA) 81 MG chewable tablet, Take 2 tablets (162 mg) by mouth daily  guaiFENesin (MUCINEX) 600 MG 12 hr tablet, Take 1 tablet (600 mg) by mouth 2 times daily for 7 days  guaiFENesin (ROBITUSSIN) 20 mg/mL liquid, Take 10 mLs (200 mg) by mouth every 4 hours as needed for cough  levalbuterol (XOPENEX HFA) 45 MCG/ACT inhaler, Inhale 2 puffs into the lungs every 4 hours as needed for shortness of breath or wheezing  methocarbamol (ROBAXIN) 750 MG tablet, Take 1 tablet (750 mg) by mouth every 6 hours as needed for muscle spasms or other (pain)  metoprolol succinate ER (TOPROL XL) 50 MG 24 hr tablet, Take 1.5 tablets (75 mg) by mouth daily  nitroGLYcerin (NITROSTAT) 0.4 MG sublingual tablet, For chest pain place 1 tablet under the tongue every 5 minutes for 3 doses. If symptoms persist 5 minutes after 1st dose call 911.  oxyCODONE (ROXICODONE) 5 MG tablet, Take 1 tablet (5 mg) by mouth every 6 hours as needed for severe pain  polyethylene glycol (MIRALAX) 17 GM/Dose powder, Take 17 g by mouth daily as needed for constipation  rosuvastatin (CRESTOR) 40 MG tablet, Take 1 tablet (40 mg) by mouth daily (Patient taking differently: Take  40 mg by mouth every morning)  senna-docusate (SENOKOT-S/PERICOLACE) 8.6-50 MG tablet, Take 2 tablets by mouth 2 times daily as needed for constipation  tamsulosin (FLOMAX) 0.4 MG capsule, Take 1 capsule (0.4 mg) by mouth daily (Patient taking differently: Take 0.4 mg by mouth every morning)    [COMPLETED] iopamidol (ISOVUE-370) solution 68 mL  [COMPLETED] sodium chloride (PF) 0.9% PF flush 97 mL        ROS:   Refer to HPI    EXAM:  /67 (BP Location: Right arm, Patient Position: Sitting, Cuff Size: Adult Regular)   Pulse 105   Wt 89.5 kg (197 lb 6.4 oz)   SpO2 98%   BMI 31.86 kg/m    GENERAL: Appears comfortable, in no acute distress.   HEENT: Eye symmetrical, no discharge or icterus bilaterally. Mucous membranes moist and without lesions.  CV: RRR, +S1S2, no murmur, rub, or gallop.   RESPIRATORY: Respirations regular and even; expiratory wheezes before coughing up phlegm. Crackles noted in LLL, lungs otherwise CTA   EXTREMITIES: no peripheral edema.   NEUROLOGIC: Alert and oriented x 3.   MUSCULOSKELETAL: No joint swelling or tenderness.   SKIN: No jaundice. No rashes or lesions. Sternal wound healing well with no signs of infection.     Labs, reviewed with patient in clinic today:  CBC RESULTS:  Lab Results   Component Value Date    WBC 12.3 (H) 11/27/2023    WBC 7.4 01/23/2020    RBC 4.07 (L) 11/27/2023    RBC 5.11 01/23/2020    HGB 12.1 (L) 11/27/2023    HGB 15.2 01/23/2020    HCT 38.3 (L) 11/27/2023    HCT 46.0 01/23/2020    MCV 94 11/27/2023    MCV 90 01/23/2020    MCH 29.7 11/27/2023    MCH 29.7 01/23/2020    MCHC 31.6 11/27/2023    MCHC 33.0 01/23/2020    RDW 13.6 11/27/2023    RDW 13.8 01/23/2020     (H) 11/27/2023     01/23/2020       CMP RESULTS:  Lab Results   Component Value Date     11/27/2023     03/11/2021    POTASSIUM 4.6 11/27/2023    POTASSIUM 3.6 11/16/2023    POTASSIUM 4.7 02/20/2023    POTASSIUM 4.8 03/11/2021    CHLORIDE 106 11/27/2023    CHLORIDE 109  "2023    CHLORIDE 107 2021    CO2 22 2023    CO2 28 2023    CO2 27 2021    ANIONGAP 9 2023    ANIONGAP 5 2023    ANIONGAP 5 2021     (H) 2023     (H) 2023    GLC 94 2023    GLC 97 2021    BUN 15.1 2023    BUN 19 2023    BUN 22 2021    CR 0.78 2023    CR 1.10 2021    GFRESTIMATED >90 2023    GFRESTIMATED >60 2023    GFRESTIMATED 68 2021    GFRESTBLACK 78 2021    DANICA 8.8 2023    DANICA 8.4 (L) 2021    BILITOTAL 0.3 2023    ALBUMIN 3.6 2023    ALBUMIN 3.8 2023    ALKPHOS 78 2023    ALT 55 2023    ALT 27 2021    AST 37 2023    AST 19 2021        INR RESULTS:  Lab Results   Component Value Date    INR 1.24 (H) 2023       Lab Results   Component Value Date    MAG 2.2 2023     Lab Results   Component Value Date    NTBNPI 457 2023     No results found for: \"NTBNP\"    LIPIDS:  Lab Results   Component Value Date    CHOL 198 2023    CHOL 152 2021     Lab Results   Component Value Date    HDL 60 2023    HDL 52 2021     Lab Results   Component Value Date    LDL 98 2023    LDL 82 2021     Lab Results   Component Value Date    TRIG 202 2023    TRIG 92 2021       Echocardiogram:  Recent Results (from the past 4320 hour(s))   Echocardiogram Complete   Result Value    LVEF  55-60%    Narrative    [id number]  [id number]  [id number]  [id number]^MULVIHILL^[facility],[place]  Echocardiography Laboratory  [address] [place] [id number]     Name: CHRISTA LANDRY  MRN: [id number]  : 1950  Study Date: 2023 09:36 AM  Age: 73 yrs  Gender: Male  Patient Location: [facility]  Reason For Study: Coronary artery disease involving native coronary artery of  jose  Ordering Physician: MULVIHILL, MICHAEL  Referring Physician: MULVIHILL, " ARPITA  Performed By: Omar Cordova     BSA: 2.0 m2  Height: 65 in  Weight: 206 lb  HR: 76  BP: 153/75 mmHg  ______________________________________________________________________________  Procedure  Echocardiogram with two-dimensional, color and spectral Doppler performed.  ______________________________________________________________________________  Interpretation Summary  Global and regional left ventricular function is normal with an EF of 55-60%.  Global right ventricular function is normal.  No significant valvular abnormalities present.  The mean RA pressure is normal at 3 mmHg.  No pericardial effusion is present.     This study was compared with the study from 10/27/23 . No significant changes  noted in the biventricular function.  ______________________________________________________________________________  Left Ventricle  Left ventricular size is normal. Left ventricular wall thickness is normal.  Global and regional left ventricular function is normal with an EF of 55-60%.  Left ventricular diastolic function is normal.     Right Ventricle  The right ventricle is normal size. Global right ventricular function is  normal.     Atria  Both atria appear normal. The atrial septum is intact as assessed by color  Doppler .     Mitral Valve  The mitral valve is normal. Mitral leaflet thickness is normal.     Aortic Valve  The aortic valve is tricuspid. Aortic valve sclerosis is present. On Doppler  interrogation, there is no significant stenosis or regurgitation.     Tricuspid Valve  Trace tricuspid insufficiency is present. The peak velocity of the tricuspid  regurgitant jet is not obtainable. Cannot estimate PAP based on TR velocity,  however low PV acceleration time (63ms) implies elevated PA pressures.     Pulmonic Valve  The pulmonic valve is normal. On Doppler interrogation, there is no  significant stenosis or regurgitation.     Vessels  The thoracic aorta is normal. Sinuses of Valsalva 3.9 cm.  Ascending aorta 3.7  cm. IVC diameter <2.1 cm collapsing >50% with sniff suggests a normal RA  pressure of 3 mmHg.     Pericardium  No pericardial effusion is present.     Miscellaneous  No significant valvular abnormalities present.     Compared to Previous Study  This study was compared with the study from 10/27/23 . No significant changes  noted.     Attestation  I have personally viewed the imaging and agree with the interpretation and  report as documented by the fellow, Migue Treadwell, and/or edited by me.  ______________________________________________________________________________  MMode/2D Measurements & Calculations  IVSd: 1.1 cm  LVIDd: 4.9 cm  LVIDs: 3.2 cm  LVPWd: 0.99 cm  FS: 33.5 %  LV mass(C)d: 180.5 grams  LV mass(C)dI: 90.1 grams/m2  Ao root diam: 3.9 cm  asc Aorta Diam: 3.7 cm  LVOT diam: 2.2 cm  LVOT area: 3.9 cm2  Ao root diam index Ht(cm/m): 2.3  Ao root diam index BSA (cm/m2): 1.9  Asc Ao diam index BSA (cm/m2): 1.8  Asc Ao diam index Ht(cm/m): 2.2     LA Volume (BP): 39.4 ml  LA Volume Index (BP): 19.7 ml/m2  RV Base: 3.9 cm  RWT: 0.41  TAPSE: 2.4 cm     Doppler Measurements & Calculations  MV E max edil: 48.7 cm/sec  MV A max edil: 69.2 cm/sec  MV E/A: 0.70  MV dec slope: 238.6 cm/sec2  MV dec time: 0.20 sec  PA acc time: 0.06 sec     E/E' av.4  Lateral E/e': 6.7  Medial E/e': 8.1  RV S Edil: 16.4 cm/sec     ______________________________________________________________________________  Report approved by: Gibson Almeida 2023 12:08 PM         Echocardiogram Exercise Stress    Narrative    [id number]  [id number]  [id number]  [id number]^TALI^BENEDICT^BELLA     [facility]  Echocardiography Laboratory  [address]  [place] [address]     Name: CHRISTA LANDRY  MRN: [id number]  : 1950  Study Date: 10/27/2023 09:28 AM  Age: 73 yrs  Gender: Male  Patient Location: [facility]  Reason For Study: OLIVO (dyspnea on exertion)  Ordering Physician: BENEDICT CESAR  BELLA  Referring Physician: BENEDICT CESAR  Performed By: Evie Clifford RDCS     BSA: 2.0 m2  Height: 65 in  Weight: 202 lb  HR: 86  BP: 134/92 mmHg  ______________________________________________________________________________  Procedure  Stress Echo Bike with two dimensional, color and spectral Doppler performed.  Contrast Definity.  ______________________________________________________________________________  Interpretation Summary     Abnormal submaximal exercise stress echocardiogram.     Patient developed significant ECG changes during peak exercise and recovery  period. 3 episodes of non-sustained VT for 3-4 beats during stage 4 exercise.  Diffuse ST segment (~1 mm) depression in inferolateral leads (downsloping in  recovery) with ST segment elevation in AVR (~1 mm) during exercise extending  in to recovery noted.     Patient was only able to reach 81% of the age predicted maximal heart rate due  to dyspnea and no ischemia was identified by imaging at this heart rate.     Normal biventricular size, thickness, and global systolic function at  baseline, LVEF=55-60%.  Normal BP response.  With exercise, LVEF increased and LV cavity size decreased appropriately.  No regional wall motion abnormalities are present at rest or with exercise.  No angina was elicited.  No significant valvular abnormalities are noted on screening Doppler exam.  The aortic root and visualized ascending aorta are normal.     Recommend cardiology consultation.     ______________________________________________________________________________  Stress  Definity (NDC #44768-668-32) given intravenously.  Patient was given 5ml mixture of 1.5ml Definity and 8.5ml saline.  5 ml wasted.  IV start location LAC .  Peak MVO2 23.0 ml/kg/min .  Percent predicted MVO2 71 %.  RPP 37512.  Maximum workload 100 portillo.  Exercise was stopped due to dyspnea.  Target Heart Rate was not achieved due to dyspnea.  The patient did not exhibit any symptoms  during exercise.  Normal blood pressure response with stress.     Stress Results                                       Maximum Predicted HR:   148 bpm             Target HR: 126 bpm        % Maximum Predicted HR: 81 %                             Stage  DurationHeart Rate  BP                                 (mm:ss)   (bpm)                         Baseline            86    134/92                           Peak    6:37     120    176/82                          Stress Duration:   6:37 mm:ss                       Maximum Stress HR: 120 bpm  ______________________________________________________________________________  MMode/2D Measurements & Calculations  asc Aorta Diam: 3.5 cm  Asc Ao diam index BSA (cm/m2): 1.8  Asc Ao diam index Ht(cm/m): 2.1     Doppler Measurements & Calculations  MV E max ligia: 46.8 cm/sec  MV A max ligia: 66.1 cm/sec  MV E/A: 0.71  MV dec time: 0.26 sec  Ao V2 max: 119.0 cm/sec  Ao max P.7 mmHg     ______________________________________________________________________________  Report approved by: Foreign DENNY 10/27/2023 10:33 AM             Coronary Angiogram:  Left Main   The vessel is large.      Left Anterior Descending   The vessel is moderate in size.   Ost LAD to Mid LAD lesion is 70% stenosed. The lesion is severely calcified.      Left Circumflex   The vessel is moderate in size.   Mid Cx lesion is 90% stenosed. The lesion is severely calcified.      First Obtuse Marginal Branch   The vessel is small.      Second Obtuse Marginal Branch   The vessel is large.      Right Coronary Artery   Prox RCA lesion is 40% stenosed. The lesion is mildly calcified.      Right Posterior Atrioventricular Artery   Collaterals   RPAV filled by collaterals from Dist Cx.                Assessment and Plan:   Mr. Mckeon is a 73 year old male with a PMH of severe multivessel CAD, prediabetes, HTN, and HLD who underwent CABG x2 (LIMA to LAD, SVG to OM) on 23 by Dr. Mulvihill . He presents to  cardiology clinic for post-cath follow up.    # CAD s/p CABG x2 (LIMA to LAD, SVG to OM) 11/16/23  # HTN  # HLD  Patient reports return of pain in his sides that prompted initial cardiac workup. Pain atypical and does not appear to be cardiac in nature. Not associated with activity or relieved with nitroglycerin. Patient also experiencing some shortness of breath that is improved with Robitussin. CT PE in ED 11/27 notable for small to moderate left layering pleural effusion and mild left basilar atelectasis.   - Encouraged more regular use of Xopenex for shortness of breath; referral to pulmonology for possible COPD 2/2 hx tobacco abuse  - Continue using IS; encouraged patient to return to ED if he is unable to catch his breath or if pain does not resolve  - Continue Toprol 75mg daily  - Continue ASA 81mg and Crestor 40mg daily  - Cardiac Rehab starting 12/6/23  - Reviewed sternal precautions  - Follow up with [facility] 12/8/23      Follow up:  [facility] 12/8/23; 6 months with general cardiology  Chart review time today: 25 minutes  Visit time today: 35 minutes  Total time spent today: 60 minutes      Virginia Kemp PA-C  General Cardiology   11/28/23

## 2023-11-25 ENCOUNTER — TELEPHONE (OUTPATIENT)
Dept: FAMILY MEDICINE | Facility: CLINIC | Age: 73
End: 2023-11-25
Payer: COMMERCIAL

## 2023-11-25 NOTE — TELEPHONE ENCOUNTER
Patient Quality Outreach    Patient is due for the following:       Topic Date Due    COVID-19 Vaccine (3 - Pfizer risk series) 04/22/2021    Flu Vaccine (1) 09/01/2023       Next Steps:   Patient has upcoming appointment, these items will be addressed at that time.    Type of outreach:    Chart review performed, no outreach needed.      Questions for provider review:    None           Elizabeth Crews, CMA

## 2023-11-27 ENCOUNTER — APPOINTMENT (OUTPATIENT)
Dept: CT IMAGING | Facility: CLINIC | Age: 73
End: 2023-11-27
Attending: STUDENT IN AN ORGANIZED HEALTH CARE EDUCATION/TRAINING PROGRAM
Payer: COMMERCIAL

## 2023-11-27 ENCOUNTER — HOSPITAL ENCOUNTER (EMERGENCY)
Facility: CLINIC | Age: 73
Discharge: HOME | End: 2023-11-27
Attending: STUDENT IN AN ORGANIZED HEALTH CARE EDUCATION/TRAINING PROGRAM | Admitting: STUDENT IN AN ORGANIZED HEALTH CARE EDUCATION/TRAINING PROGRAM
Payer: COMMERCIAL

## 2023-11-27 ENCOUNTER — TELEPHONE (OUTPATIENT)
Dept: CARDIOLOGY | Facility: CLINIC | Age: 73
End: 2023-11-27
Payer: COMMERCIAL

## 2023-11-27 ENCOUNTER — APPOINTMENT (OUTPATIENT)
Dept: GENERAL RADIOLOGY | Facility: CLINIC | Age: 73
End: 2023-11-27
Attending: STUDENT IN AN ORGANIZED HEALTH CARE EDUCATION/TRAINING PROGRAM
Payer: COMMERCIAL

## 2023-11-27 VITALS
BODY MASS INDEX: 32.78 KG/M2 | DIASTOLIC BLOOD PRESSURE: 88 MMHG | HEIGHT: 66 IN | SYSTOLIC BLOOD PRESSURE: 110 MMHG | TEMPERATURE: 97.8 F | HEART RATE: 94 BPM | OXYGEN SATURATION: 99 % | RESPIRATION RATE: 18 BRPM | WEIGHT: 204 LBS

## 2023-11-27 DIAGNOSIS — G89.18 ACUTE POST-OPERATIVE PAIN: ICD-10-CM

## 2023-11-27 DIAGNOSIS — Z95.1 HX OF CABG: ICD-10-CM

## 2023-11-27 DIAGNOSIS — Z95.1 S/P CABG (CORONARY ARTERY BYPASS GRAFT): Primary | ICD-10-CM

## 2023-11-27 DIAGNOSIS — R07.9 CHEST PAIN, UNSPECIFIED TYPE: ICD-10-CM

## 2023-11-27 LAB
ALBUMIN SERPL BCG-MCNC: 3.6 G/DL (ref 3.5–5.2)
ALP SERPL-CCNC: 78 U/L (ref 40–150)
ALT SERPL W P-5'-P-CCNC: 55 U/L (ref 0–70)
ANION GAP SERPL CALCULATED.3IONS-SCNC: 9 MMOL/L (ref 7–15)
AST SERPL W P-5'-P-CCNC: 37 U/L (ref 0–45)
BASOPHILS # BLD AUTO: 0.1 10E3/UL (ref 0–0.2)
BASOPHILS NFR BLD AUTO: 1 %
BILIRUB SERPL-MCNC: 0.3 MG/DL
BUN SERPL-MCNC: 15.1 MG/DL (ref 8–23)
CALCIUM SERPL-MCNC: 8.8 MG/DL (ref 8.8–10.2)
CHLORIDE SERPL-SCNC: 106 MMOL/L (ref 98–107)
CREAT SERPL-MCNC: 0.78 MG/DL (ref 0.67–1.17)
D DIMER PPP FEU-MCNC: 5.15 UG/ML FEU (ref 0–0.5)
DEPRECATED HCO3 PLAS-SCNC: 22 MMOL/L (ref 22–29)
EGFRCR SERPLBLD CKD-EPI 2021: >90 ML/MIN/1.73M2
EOSINOPHIL # BLD AUTO: 0.6 10E3/UL (ref 0–0.7)
EOSINOPHIL NFR BLD AUTO: 5 %
ERYTHROCYTE [DISTWIDTH] IN BLOOD BY AUTOMATED COUNT: 13.6 % (ref 10–15)
FLUAV RNA SPEC QL NAA+PROBE: NEGATIVE
FLUBV RNA RESP QL NAA+PROBE: NEGATIVE
GLUCOSE SERPL-MCNC: 110 MG/DL (ref 70–99)
HCT VFR BLD AUTO: 38.3 % (ref 40–53)
HGB BLD-MCNC: 12.1 G/DL (ref 13.3–17.7)
IMM GRANULOCYTES # BLD: 0.1 10E3/UL
IMM GRANULOCYTES NFR BLD: 1 %
INR PPP: 1.24 (ref 0.85–1.15)
LYMPHOCYTES # BLD AUTO: 1.7 10E3/UL (ref 0.8–5.3)
LYMPHOCYTES NFR BLD AUTO: 14 %
MCH RBC QN AUTO: 29.7 PG (ref 26.5–33)
MCHC RBC AUTO-ENTMCNC: 31.6 G/DL (ref 31.5–36.5)
MCV RBC AUTO: 94 FL (ref 78–100)
MONOCYTES # BLD AUTO: 0.8 10E3/UL (ref 0–1.3)
MONOCYTES NFR BLD AUTO: 7 %
NEUTROPHILS # BLD AUTO: 9 10E3/UL (ref 1.6–8.3)
NEUTROPHILS NFR BLD AUTO: 72 %
NRBC # BLD AUTO: 0 10E3/UL
NRBC BLD AUTO-RTO: 0 /100
NT-PROBNP SERPL-MCNC: 457 PG/ML (ref 0–900)
PLATELET # BLD AUTO: 480 10E3/UL (ref 150–450)
POTASSIUM SERPL-SCNC: 4.6 MMOL/L (ref 3.4–5.3)
PROT SERPL-MCNC: 6.7 G/DL (ref 6.4–8.3)
RBC # BLD AUTO: 4.07 10E6/UL (ref 4.4–5.9)
RSV RNA SPEC NAA+PROBE: NEGATIVE
SARS-COV-2 RNA RESP QL NAA+PROBE: NEGATIVE
SODIUM SERPL-SCNC: 137 MMOL/L (ref 135–145)
TROPONIN T SERPL HS-MCNC: 20 NG/L
WBC # BLD AUTO: 12.3 10E3/UL (ref 4–11)

## 2023-11-27 PROCEDURE — 87637 SARSCOV2&INF A&B&RSV AMP PRB: CPT | Performed by: STUDENT IN AN ORGANIZED HEALTH CARE EDUCATION/TRAINING PROGRAM

## 2023-11-27 PROCEDURE — 71275 CT ANGIOGRAPHY CHEST: CPT

## 2023-11-27 PROCEDURE — 36415 COLL VENOUS BLD VENIPUNCTURE: CPT | Performed by: STUDENT IN AN ORGANIZED HEALTH CARE EDUCATION/TRAINING PROGRAM

## 2023-11-27 PROCEDURE — 83880 ASSAY OF NATRIURETIC PEPTIDE: CPT | Performed by: STUDENT IN AN ORGANIZED HEALTH CARE EDUCATION/TRAINING PROGRAM

## 2023-11-27 PROCEDURE — 99285 EMERGENCY DEPT VISIT HI MDM: CPT | Mod: 25 | Performed by: STUDENT IN AN ORGANIZED HEALTH CARE EDUCATION/TRAINING PROGRAM

## 2023-11-27 PROCEDURE — 80053 COMPREHEN METABOLIC PANEL: CPT | Performed by: STUDENT IN AN ORGANIZED HEALTH CARE EDUCATION/TRAINING PROGRAM

## 2023-11-27 PROCEDURE — 71275 CT ANGIOGRAPHY CHEST: CPT | Mod: 26 | Performed by: RADIOLOGY

## 2023-11-27 PROCEDURE — 85379 FIBRIN DEGRADATION QUANT: CPT | Performed by: STUDENT IN AN ORGANIZED HEALTH CARE EDUCATION/TRAINING PROGRAM

## 2023-11-27 PROCEDURE — 85041 AUTOMATED RBC COUNT: CPT | Performed by: STUDENT IN AN ORGANIZED HEALTH CARE EDUCATION/TRAINING PROGRAM

## 2023-11-27 PROCEDURE — 93010 ELECTROCARDIOGRAM REPORT: CPT | Performed by: STUDENT IN AN ORGANIZED HEALTH CARE EDUCATION/TRAINING PROGRAM

## 2023-11-27 PROCEDURE — 85610 PROTHROMBIN TIME: CPT | Performed by: STUDENT IN AN ORGANIZED HEALTH CARE EDUCATION/TRAINING PROGRAM

## 2023-11-27 PROCEDURE — 71046 X-RAY EXAM CHEST 2 VIEWS: CPT

## 2023-11-27 PROCEDURE — 250N000011 HC RX IP 250 OP 636: Performed by: STUDENT IN AN ORGANIZED HEALTH CARE EDUCATION/TRAINING PROGRAM

## 2023-11-27 PROCEDURE — 93005 ELECTROCARDIOGRAM TRACING: CPT | Performed by: STUDENT IN AN ORGANIZED HEALTH CARE EDUCATION/TRAINING PROGRAM

## 2023-11-27 PROCEDURE — 71046 X-RAY EXAM CHEST 2 VIEWS: CPT | Mod: 26 | Performed by: RADIOLOGY

## 2023-11-27 PROCEDURE — 84484 ASSAY OF TROPONIN QUANT: CPT | Performed by: STUDENT IN AN ORGANIZED HEALTH CARE EDUCATION/TRAINING PROGRAM

## 2023-11-27 RX ORDER — IOPAMIDOL 755 MG/ML
68 INJECTION, SOLUTION INTRAVASCULAR ONCE
Status: COMPLETED | OUTPATIENT
Start: 2023-11-27 | End: 2023-11-27

## 2023-11-27 RX ADMIN — IOPAMIDOL 68 ML: 755 INJECTION, SOLUTION INTRAVENOUS at 18:28

## 2023-11-27 ASSESSMENT — ACTIVITIES OF DAILY LIVING (ADL)
ADLS_ACUITY_SCORE: 35

## 2023-11-27 NOTE — TELEPHONE ENCOUNTER
[facility] received a call from patient regarding increased pain after trying to decrease oxycodone. Patient is complaining of pain that is under his armpits that started Friday. This is the pain that initially prompted him for further cardiac workup which led to them finding CAD. This pain was gone until it restarted on Friday. It comes and goes randomly and is not increased/decrease with rest or exercise. He is not diaphoretic but does complain of shortness of breath that he's had since surgery. Due to the nature and recent onset of angina patient was reviewed with CVTS NATALIA and it was agreed that patient should be seen in emergency room for further work up. Patient verbalized understanding and will have his wife bring him to [facility].

## 2023-11-27 NOTE — ED TRIAGE NOTES
Coming in with bilateral underarm pain. First noticed in October. Had bypass surgery and now pain is back. Directed to ED for evaluation.     Triage Assessment (Adult)       Row Name 11/27/23 1407          Triage Assessment    Airway WDL WDL        Respiratory WDL    Respiratory WDL WDL        Skin Circulation/Temperature WDL    Skin Circulation/Temperature WDL WDL

## 2023-11-27 NOTE — ED PROVIDER NOTES
[place] EMERGENCY DEPARTMENT ([facility])    11/27/23       ED PROVIDER NOTE    History     Chief Complaint   Patient presents with    side pain     Under right arm     HPI  Aquiles Mckeon is a 73 year old male with PMH of HLD, prediabetes, CAD s/p CABG x2 who presents to the Emergency Department today due pain that he is having in both of his axilla.  Patient notes that he has been having this pain on and off associated with some shortness of breath.  He was evaluated by his primary care doctor who then sent him for stress imaging which demonstrated some concerns for his cardiovascular health.  This began his evaluation which resulted in him getting his CABG.    Per chart review, patient recently had CABG with endoscopic harvest of left greater saphenous vein on 11/16/2023 by Dr. Mulvihill. Patient tolerated the operation well. Patient had a discharge follow-up phone call on 11/24/2023, where he reported post-op pain with scale of 5/10 in severity on robaxin, oxycodone, and tylenol. He was encourage to wean oxycodone if able and use heat/ice for pain control instead.     Overall, patient notes that he has not had any of that pain in his axilla and has been having some normal postoperative pain however.  However 2 days ago started having some increasing pain in the axilla again that he endorses was similar to his pain he had before his CABG.  This comes and goes at random times, does not seem to be in any worse with exertion, but is somewhat pleuritic.  Endorses some shortness of breath that is fairly similar to his recent baseline as well as some chills.  No cough, no fevers.    Past Medical History  Past Medical History:   Diagnosis Date    ED (erectile dysfunction)     Hx of colonic polyp 01/01/2001     Past Surgical History:   Procedure Laterality Date    BYPASS GRAFT ARTERY CORONARY N/A 11/16/2023    Procedure: Median Sternotomy, Cardiopulmonary Bypass, Intraoperative Transesophageal  Echocardiogram, Coronary Artery Bypass Graft x 2, Left Internal Mammary Artery [place], Endoscopic [place] of Left Greater Saphenous Vein, Intraoperative Epi-Aortic Untrasound;  Surgeon: Mulvihill, Michael, MD;  Location: [facility] OR    COLONOSCOPY  2001    Reported as having a polyp removed    COLONOSCOPY  2004    Clear by report    COLONOSCOPY N/A 1/29/2016    Procedure: COLONOSCOPY;  Surgeon: Juanjo Martínez MD;  Location: [facility] OR    COLONOSCOPY N/A 4/19/2022    Procedure: COLONOSCOPY, FLEXIBLE, WITH LESION REMOVAL USING SNARE;  Surgeon: Jian Mixon DO;  Location: [facility] OR    COLONOSCOPY WITH CO2 INSUFFLATION N/A 1/29/2016    Procedure: COLONOSCOPY WITH CO2 INSUFFLATION;  Surgeon: Juanjo Martínez MD;  Location: [facility] OR    COLONOSCOPY WITH CO2 INSUFFLATION N/A 4/19/2022    Procedure: COLONOSCOPY, WITH CO2 INSUFFLATION;  Surgeon: Jian Mixon DO;  Location: [facility] OR    CV CORONARY ANGIOGRAM N/A 11/9/2023    Procedure: Coronary Angiogram;  Surgeon: Channing Marin MD;  Location:  HEART CARDIAC CATH LAB    CV PCI N/A 11/9/2023    Procedure: Percutaneous Coronary Intervention;  Surgeon: Channing Marin MD;  Location:  HEART CARDIAC CATH LAB     acetaminophen (TYLENOL) 500 MG tablet  aspirin (ASA) 81 MG chewable tablet  guaiFENesin (MUCINEX) 600 MG 12 hr tablet  guaiFENesin (ROBITUSSIN) 20 mg/mL liquid  levalbuterol (XOPENEX HFA) 45 MCG/ACT inhaler  methocarbamol (ROBAXIN) 750 MG tablet  metoprolol succinate ER (TOPROL XL) 50 MG 24 hr tablet  nitroGLYcerin (NITROSTAT) 0.4 MG sublingual tablet  oxyCODONE (ROXICODONE) 5 MG tablet  polyethylene glycol (MIRALAX) 17 GM/Dose powder  rosuvastatin (CRESTOR) 40 MG tablet  senna-docusate (SENOKOT-S/PERICOLACE) 8.6-50 MG tablet  tamsulosin (FLOMAX) 0.4 MG capsule      Allergies   Allergen Reactions    Ampicillin      Family History  Family History   Problem Relation Age of Onset    Cervical Cancer Mother     Lung Cancer Mother      "Heart Disease Father         CHF    Emphysema Father     Coronary Artery Disease Father         MI in 50s    Diabetes No family hx of     Hypertension No family hx of     Breast Cancer No family hx of     Prostate Cancer No family hx of     Mental Illness No family hx of     Osteoporosis No family hx of     Obesity No family hx of      Social History   Social History     Tobacco Use    Smoking status: Former     Packs/day: 0.80     Years: 40.00     Additional pack years: 0.00     Total pack years: 32.00     Types: Cigarettes     Quit date: 2015     Years since quittin.5     Passive exposure: Never    Smokeless tobacco: Never    Tobacco comments:     quit smoking 2015   Vaping Use    Vaping Use: Never used   Substance Use Topics    Alcohol use: Yes     Alcohol/week: 10.0 standard drinks of alcohol     Types: 10 Standard drinks or equivalent per week    Drug use: No         A medically appropriate review of systems was performed with pertinent positives and negatives noted in the HPI, and all other systems negative.    Physical Exam   BP: 120/74  Pulse: 98  Temp: 97.4  F (36.3  C)  Resp: 14  Height: 167.6 cm (5' 6\")  Weight: 92.5 kg (204 lb)  SpO2: 97 %  Physical Exam  GEN: Well appearing, non toxic, cooperative, somewhat winded when speaking in long sentences  HEENT: normocephalic and atraumatic, PERRLA, EOMI  CV: well-perfused, normal skin color for ethnicity, regular rate and rhythm  PULM: breathing comfortably, in no respiratory distress, clear to auscultation upper and lower lung fields  ABD: nondistended, soft, nontender  EXT: Full range of motion.  No edema.  NEURO: awake, conversant, grossly normal bilateral upper and lower extremity strength & ROM   SKIN: No rashes, ecchymosis, or lacerations  PSYCH: Calm and cooperative, interactive      ED Course, Procedures, & Data      Procedures            EKG Interpretation:      Interpreted by Kenzie Rizzo MD  Time reviewed: 1623  Symptoms at time of EKG: " chest pain   Rhythm: normal sinus   Rate: normal  Axis: normal  Ectopy: none  Conduction: Right axis deviation  ST Segments/ T Waves: No ST-T wave changes  Q Waves: Inferior Q waves, baseline  Comparison to prior: Unchanged    Clinical Impression: Right axis deviation, baseline T wave inversions in the inferior leads, no other significant changes from baseline      Results for orders placed or performed during the hospital encounter of 11/27/23   XR Chest 2 Views     Status: None    Narrative    Chest 2 views    INDICATION: Chest pain    COMPARISON: 11/20/2023    FINDINGS: Heart size remains normal. Median sternotomy again evident  with intact cerclage wires. Left costophrenic angle blunting appears  similar. Decreased conspicuity of the linear density at the lateral  left lung field suggests decreased subsegmental atelectasis in this  area. No new opacities of suspicion.      Impression    IMPRESSION: Continued left pleural effusion versus lung base scarring  with decreased left lung subsegmental atelectasis. Prior median  sternotomy.    WILLIAMS BRAMBILA MD         SYSTEM ID:  [id number]   CT Chest Pulmonary Embolism w Contrast     Status: None (Preliminary result)    Narrative    CT CHEST PULMONARY EMBOLISM W CONTRAST, 11/27/2023 6:44 PM    History: pleuritic cp    Comparison: 11/2/2023.    History from chart: History CABG 11/16/2023    Technique: Helical acquisition of CT images of the chest from the lung  apices to the kidneys were acquired after the administration of  intravenous contrast according to the CT pulmonary angiogram protocol.  Axial images were reconstructed in 1 and 3 mm slice thickness. Coronal  reconstructions performed. Three-dimensional (3D) post-processed  angiographic images were reconstructed, archived to PACS and used in  the interpretation of this study.    Contrast dose: iopamidol (ISOVUE-370) solution 68 mL    Findings:   The contrast bolus is adequate.  There is no pulmonary embolism.  Small  to moderate left layering pleural effusion. No pneumothorax. No right  effusion. Mild lingular and left basilar atelectasis. The remaining  tracheobronchial tree is clear.    Mediastinum: The heart is not enlarged. Trace pericardial effusion.  The Ascending aorta and main pulmonary artery are normal in caliber.  Wide common origin of the brachiocephalic and left common carotid  artery, otherwise normal appearance of the aortic arch. . Small foci  of gas within the anterior mediastinum. No hilar or mediastinal or  axillary adenopathy.    Upper abdomen: Atrophic pancreas. Left upper quadrant splenule.  Otherwise, the appearance of the upper abdomen is unremarkable.    Bones and soft tissues: Changes of median sternotomy.       Impression    Impression:  1. No pulmonary embolism identified.  2. Postoperative changes of median sternotomy with small foci of gas  in the anterior mediastinum which are likely postsurgical.  3. Small to moderate left layering pleural effusion.    In the event of a positive result for acute pulmonary embolism  resulting in right heart strain, consider calling the   [facility] patient placement ([phone]) for PERT (Pulmonary Embolism  Response Team) Activation?    PERT -- Pulmonary Embolism Response Team (Multidisciplinary team  including cardiology, interventional radiology, critical care,  hematology)   Comprehensive metabolic panel     Status: Abnormal   Result Value Ref Range    Sodium 137 135 - 145 mmol/L    Potassium 4.6 3.4 - 5.3 mmol/L    Carbon Dioxide (CO2) 22 22 - 29 mmol/L    Anion Gap 9 7 - 15 mmol/L    Urea Nitrogen 15.1 8.0 - 23.0 mg/dL    Creatinine 0.78 0.67 - 1.17 mg/dL    GFR Estimate >90 >60 mL/min/1.73m2    Calcium 8.8 8.8 - 10.2 mg/dL    Chloride 106 98 - 107 mmol/L    Glucose 110 (H) 70 - 99 mg/dL    Alkaline Phosphatase 78 40 - 150 U/L    AST 37 0 - 45 U/L    ALT 55 0 - 70 U/L    Protein Total 6.7 6.4 - 8.3 g/dL    Albumin 3.6 3.5 - 5.2 g/dL    Bilirubin Total 0.3  <=1.2 mg/dL   Troponin T, High Sensitivity     Status: Normal   Result Value Ref Range    Troponin T, High Sensitivity 20 <=22 ng/L   D dimer quantitative     Status: Abnormal   Result Value Ref Range    D-Dimer Quantitative 5.15 (H) 0.00 - 0.50 ug/mL FEU    Narrative    This D-dimer assay is intended for use in conjunction with a clinical pretest probability assessment model to exclude pulmonary embolism (PE) and deep venous thrombosis (DVT) in outpatients suspected of PE or DVT. The cut-off value is 0.50 ug/mL FEU.    For patients 50 years of age or older, the application of age-adjusted cut-off values for D-Dimer may increase the specificity without significant effect on sensitivity. The literature suggested calculation age adjusted cut-off in ug/L = age in years x 10 ug/L. The results in this laboratory are reported as ug/mL rather than ug/L. The calculation for age adjusted cut off in ug/mL= age in years x 0.01 ug/mL. For example, the cut off for a 76 year old male is 77 x 0.01 ug/mL = 0.76 ug/mL (760 ug/L).    M Jerzy et al. Age adjusted D-dimer cut-off levels to rule out pulmonary embolism: The ADJUST-PE Study. NINA 2014;311:6237-1237.; HJ Tremaine et al. Diagnostic accuracy of conventional or age adjusted D-dimer cutoff values in older patients with suspected venous thromboembolism. Systemic review and meta-analysis. BMJ 2013:346:f2492.   INR     Status: Abnormal   Result Value Ref Range    INR 1.24 (H) 0.85 - 1.15   Symptomatic Influenza A/B, RSV, & SARS-CoV2 PCR (COVID-19) Nose     Status: Normal    Specimen: Nose; Swab   Result Value Ref Range    Influenza A PCR Negative Negative    Influenza B PCR Negative Negative    RSV PCR Negative Negative    SARS CoV2 PCR Negative Negative    Narrative    Testing was performed using the Xpert Xpress CoV2/Flu/RSV Assay on the [employer] Instrument. This test should be ordered for the detection of SARS-CoV-2, influenza, and RSV viruses in individuals who meet  clinical and/or epidemiological criteria. Test performance is unknown in asymptomatic patients. This test is for in vitro diagnostic use under the FDA EUA for laboratories certified under CLIA to perform high or moderate complexity testing. This test has not been FDA cleared or approved. A negative result does not rule out the presence of PCR inhibitors in the specimen or target RNA in concentration below the limit of detection for the assay. If only one viral target is positive but coinfection with multiple targets is suspected, the sample should be re-tested with another FDA cleared, approved, or authorized test, if coinfection would change clinical management. This test was validated by the [facility]. These laboratories are certified under the Clinical Laboratory Improvement Amendments of 1988 (CLIA-88) as qualified to perform high complexity laboratory testing.   Nt probnp inpatient (BNP)     Status: Normal   Result Value Ref Range    N terminal Pro BNP Inpatient 457 0 - 900 pg/mL   CBC with platelets and differential     Status: Abnormal   Result Value Ref Range    WBC Count 12.3 (H) 4.0 - 11.0 10e3/uL    RBC Count 4.07 (L) 4.40 - 5.90 10e6/uL    Hemoglobin 12.1 (L) 13.3 - 17.7 g/dL    Hematocrit 38.3 (L) 40.0 - 53.0 %    MCV 94 78 - 100 fL    MCH 29.7 26.5 - 33.0 pg    MCHC 31.6 31.5 - 36.5 g/dL    RDW 13.6 10.0 - 15.0 %    Platelet Count 480 (H) 150 - 450 10e3/uL    % Neutrophils 72 %    % Lymphocytes 14 %    % Monocytes 7 %    % Eosinophils 5 %    % Basophils 1 %    % Immature Granulocytes 1 %    NRBCs per 100 WBC 0 <1 /100    Absolute Neutrophils 9.0 (H) 1.6 - 8.3 10e3/uL    Absolute Lymphocytes 1.7 0.8 - 5.3 10e3/uL    Absolute Monocytes 0.8 0.0 - 1.3 10e3/uL    Absolute Eosinophils 0.6 0.0 - 0.7 10e3/uL    Absolute Basophils 0.1 0.0 - 0.2 10e3/uL    Absolute Immature Granulocytes 0.1 <=0.4 10e3/uL    Absolute NRBCs 0.0 10e3/uL   EKG 12-lead, tracing only     Status: None (Preliminary  result)   Result Value Ref Range    Systolic Blood Pressure  mmHg    Diastolic Blood Pressure  mmHg    Ventricular Rate 95 BPM    Atrial Rate 95 BPM    CA Interval 128 ms    QRS Duration 76 ms     ms    QTc 449 ms    P Axis 56 degrees    R AXIS 100 degrees    T Axis 72 degrees    Interpretation ECG Sinus rhythm  Rightward axis  Borderline ECG      CBC with platelets differential     Status: Abnormal    Narrative    The following orders were created for panel order CBC with platelets differential.  Procedure                               Abnormality         Status                     ---------                               -----------         ------                     CBC with platelets and d...[[id number]]  Abnormal            Final result                 Please view results for these tests on the individual orders.     Medications   iopamidol (ISOVUE-370) solution 68 mL (68 mLs Intravenous $Given 11/27/23 1828)   sodium chloride (PF) 0.9% PF flush 97 mL (97 mLs Intracatheter $Given 11/27/23 1828)     Labs Ordered and Resulted from Time of ED Arrival to Time of ED Departure   COMPREHENSIVE METABOLIC PANEL - Abnormal       Result Value    Sodium 137      Potassium 4.6      Carbon Dioxide (CO2) 22      Anion Gap 9      Urea Nitrogen 15.1      Creatinine 0.78      GFR Estimate >90      Calcium 8.8      Chloride 106      Glucose 110 (*)     Alkaline Phosphatase 78      AST 37      ALT 55      Protein Total 6.7      Albumin 3.6      Bilirubin Total 0.3     D DIMER QUANTITATIVE - Abnormal    D-Dimer Quantitative 5.15 (*)    INR - Abnormal    INR 1.24 (*)    CBC WITH PLATELETS AND DIFFERENTIAL - Abnormal    WBC Count 12.3 (*)     RBC Count 4.07 (*)     Hemoglobin 12.1 (*)     Hematocrit 38.3 (*)     MCV 94      MCH 29.7      MCHC 31.6      RDW 13.6      Platelet Count 480 (*)     % Neutrophils 72      % Lymphocytes 14      % Monocytes 7      % Eosinophils 5      % Basophils 1      % Immature Granulocytes 1      NRBCs  per 100 WBC 0      Absolute Neutrophils 9.0 (*)     Absolute Lymphocytes 1.7      Absolute Monocytes 0.8      Absolute Eosinophils 0.6      Absolute Basophils 0.1      Absolute Immature Granulocytes 0.1      Absolute NRBCs 0.0     TROPONIN T, HIGH SENSITIVITY - Normal    Troponin T, High Sensitivity 20     INFLUENZA A/B, RSV, & SARS-COV2 PCR - Normal    Influenza A PCR Negative      Influenza B PCR Negative      RSV PCR Negative      SARS CoV2 PCR Negative     NT PROBNP INPATIENT - Normal    N terminal Pro BNP Inpatient 457       CT Chest Pulmonary Embolism w Contrast   Preliminary Result   Impression:   1. No pulmonary embolism identified.   2. Postoperative changes of median sternotomy with small foci of gas   in the anterior mediastinum which are likely postsurgical.   3. Small to moderate left layering pleural effusion.      In the event of a positive result for acute pulmonary embolism   resulting in right heart strain, consider calling the    [facility] patient placement ([phone]) for PERT (Pulmonary Embolism   Response Team) Activation?      PERT -- Pulmonary Embolism Response Team (Multidisciplinary team   including cardiology, interventional radiology, critical care,   hematology)      XR Chest 2 Views   Final Result   IMPRESSION: Continued left pleural effusion versus lung base scarring   with decreased left lung subsegmental atelectasis. Prior median   sternotomy.      WILLIAMS BRAMBILA MD            SYSTEM ID:  [id number]             Critical care was not performed.     Medical Decision Making  The patient's presentation was of high complexity (a chronic illness severe exacerbation, progression, or side effect of treatment).    The patient's evaluation involved:  review of external note(s) from 3+ sources (see separate area of note for details)  review of 3+ test result(s) ordered prior to this encounter (see separate area of note for details)  ordering and/or review of 3+ test(s) in this encounter (see  separate area of note for details)  discussion of management or test interpretation with another health professional (see separate area of note for details)    The patient's management necessitated high risk (a decision regarding hospitalization).    Assessment & Plan    73-year-old male with a past medical history of CAD, hypertension, hyperlipidemia, diabetes with recent CABG about 2 weeks ago presenting to the emergency department due to increasing bilateral axillary pain that is somewhat pleuritic, comes and goes, and is similar in presentation to the pain he had that led him to his CABG here with some mild pain in his axilla bilaterally, but otherwise relatively reassuring physical exam.    Initial EKG without any significant ischemic changes.  Bedside echocardiogram demonstrates some generally poor contractility, without any specific wall motion abnormalities but no significant large pericardial effusion noted.    With him being postoperative, I have multiple concerns including postoperative complications such as CABG occlusion, ACS, less likely pericardial effusion, pericarditis, PE, pneumonia.    We will plan for chest x-ray, lab work including D-dimer.  With his recent CT surgery, will also plan to involve cardiothoracic surgery as well.    5:52 PM dimer elevated. Will obtain CTA.    9:48 PM work-up here relatively unrevealing.  Previously noted pleural effusion still present.  Patient was evaluated by cardiothoracic surgery, who also evaluated his imaging.  At this point in time the cardiothoracic surgeons recommend that he be discharged with sternal precautions that he needs to strictly follow, and other over-the-counter pain control in the meantime.    I have reviewed the nursing notes. I have reviewed the findings, diagnosis, plan and need for follow up with the patient.    New Prescriptions    No medications on file       Final diagnoses:   Chest pain, unspecified type   Hx of CABG   Acute  post-operative pain       Kenzie Rizzo MD   [facility] EMERGENCY DEPARTMENT  11/27/2023     Kenzie Rizzo MD  11/27/23 [id number]

## 2023-11-28 ENCOUNTER — OFFICE VISIT (OUTPATIENT)
Dept: CARDIOLOGY | Facility: CLINIC | Age: 73
End: 2023-11-28
Payer: COMMERCIAL

## 2023-11-28 VITALS
DIASTOLIC BLOOD PRESSURE: 67 MMHG | BODY MASS INDEX: 31.86 KG/M2 | HEART RATE: 105 BPM | WEIGHT: 197.4 LBS | OXYGEN SATURATION: 98 % | SYSTOLIC BLOOD PRESSURE: 104 MMHG

## 2023-11-28 DIAGNOSIS — R94.39 ABNORMAL STRESS ECHOCARDIOGRAM: ICD-10-CM

## 2023-11-28 DIAGNOSIS — I25.10 CORONARY ARTERY DISEASE INVOLVING NATIVE CORONARY ARTERY OF NATIVE HEART WITHOUT ANGINA PECTORIS: ICD-10-CM

## 2023-11-28 LAB
ATRIAL RATE - MUSE: 95 BPM
DIASTOLIC BLOOD PRESSURE - MUSE: NORMAL MMHG
INTERPRETATION ECG - MUSE: NORMAL
P AXIS - MUSE: 56 DEGREES
PR INTERVAL - MUSE: 128 MS
QRS DURATION - MUSE: 76 MS
QT - MUSE: 358 MS
QTC - MUSE: 449 MS
R AXIS - MUSE: 100 DEGREES
SYSTOLIC BLOOD PRESSURE - MUSE: NORMAL MMHG
T AXIS - MUSE: 72 DEGREES
VENTRICULAR RATE- MUSE: 95 BPM

## 2023-11-28 PROCEDURE — 99417 PROLNG OP E/M EACH 15 MIN: CPT

## 2023-11-28 PROCEDURE — 99215 OFFICE O/P EST HI 40 MIN: CPT | Mod: 24

## 2023-11-28 NOTE — PATIENT INSTRUCTIONS
Take your medicines every day, as directed     Changes made today:  No medication changes made today        Cardiology Care Coordinators:      Guillermina BORGES RN         Phone  [phone]      Fax [phone]    To Contact us     During Business Hours:  [phone]     If you are needing refills please contact your pharmacy.     For urgent after hour care please call the [facility] at [phone] or the [facility] at [phone] and ask to speak to the cardiologist on call.            HOW TO CHECK YOUR BLOOD PRESSURE AT HOME:     Avoid eating, smoking, and exercising for at least 30 minutes before taking a reading.     Be sure you have taken your BP medication at least 2-3 hours before you check it.      Sit quietly for 10 minutes before a reading.      Sit in a chair with your feet flat on the floor. Rest your  arm on a table so that the arm cuff is at the same level as your heart.     Remain still during the reading.  Record your blood pressure and pulse in a log and bring to your next appointment.       Use [employer] allows you to communicate directly with your heart team through secure messaging.  [employer] can be accessed any time on your phone, computer, or tablet.  If you need assistance, we'd be happy to help!             Keep your Heart Appointments:     Follow-up in 6 months   Keep your appointments with [facility]

## 2023-11-28 NOTE — ED NOTES
Pt received discharge paperwork, pt had no further questions for the RN of MD, IV removed and vitals taken, pt ambulated out of the ED with spouse, pt stated they were able to get a ride to take them home

## 2023-11-28 NOTE — CONSULTS
"CARDIOTHORACIC SURGERY CONSULT NOTE  11/27/2023    REASON FOR CONSULTATION: Postop SOB     ASSESSMENT: Aquiles Mckeon is a 73 year old male with PMH of severe multivessel CAD, prediabetes, HLD who underwent CABG x2, left Internal Mammary Artery [place], Endoscopic [place] of Left Greater Saphenous Vein on 11/16/23 by Dr. Mulvihill. He is presenting with bilateral axillary pain, mild wheezing, and imaging findings concerning for loose inferior sternal wire.     PLAN:   - Reiterate sternal precautions for 8 weeks post CABG: no lifting > 5 pounds in upper extremities, no pushing or pulling motions, no reaching behind the back, no lifting arm above the head  - Follow up in 1 week with Dr. Mulvihill's team   - Okay for OTC pain medications   - Continue pulmonary toilette with deep breathing, coughing    Patient discussed with CVTS fellow Dr. Washington who will discuss with staff.     Suzanne Maloney MD PGY2  General Surgery Resident     - - - - - - - - - - - - - - - - - - - - - - - - - - - - - - - - - - - - - - - - - - - - - - - - - - - - - - - - - - - - - - - - - - - - - - - -     HISTORY PRESENTING ILLNESS: Obtained from chart review and per patient. Per chart review:   \"Aquiles Mckeon is a 73 year old male with PMH of severe multivessel CAD, prediabetes, HLD who underwent CABG x2, left Internal Mammary Artery [place], Endoscopic [place] of Left Greater Saphenous Vein on 11/16/23 by Dr. Mulvihill. Postoperatively was admitted to the CVICU.  Patient was extubated within protocol on POD #0.  Blood pressure and cardiac index were managed with vasopressors and inotropic agents which were continuously weaned until no longer needed. Patient was subsequently  transferred to the surgical telemetry floor. While on the surgical unit, the patient continued to progress well. Chest tubes and temporary pacemaker wires were removed when deemed appropriate. Patient was fluid overloaded and treated with diuretics. They remain up " "about 2lbs from preoperative weight and will not discharge with diuretic therapy; will re-evaluate need in clinic follow-up. Patient was transiently hyperglycemic and treated with insulin infusion then transitioned to sliding scale insulin per protocol. Blood sugars remained stable. No further glycemic control agents needed at this time. Jcarlos has a long history of smoking though he has since quit. He is discharging with PRN levalbuterol inhaler and a pulmonary referral was placed. Prior to discharge, his pain was controlled well, he was working well with therapies, able to perform most ADLs, ambulate without difficulty, and had full return of bowel and bladder function. On November 21, 2023, he was discharged to home in stable condition.\"     The patient has had normal postoperative pain 5/10 since surgery per clinic follow up telephone notes. 2 days ago, he started having increasing pain in the axilla again similar to previous before his CABG. He states the pain is dull and achy, although intermittently sharp. Onset is sporadic and duration varies from a few seconds to a few minutes. Endorses some pain that goes over his right shoulder to his right back. No association with exertion - comes on sporadically even when sitting down and watching TV. Denies overt retrosternal chest pain. Has some shortness of breath with exertion and when going up stairs unchanged from recent hospitalization and not worsening. He denies orthopnea. Denies nausea or vomiting. Denies abdominal pain. Denies association of axillary pain or pain radiating over the shoulder with eating. Having normal bowel movements and urinating without issues. Denies lower extremity swelling. Endorses mild cough and difficulty clearing secretions due to incisional pain. Mild wheezing.     REVIEW OF SYSTEMS: 10 point ROS neg other than the symptoms noted above in the HPI.    PAST MEDICAL HISTORY:    has a past medical history of ED (erectile dysfunction) " and colonic polyp (01/01/2001).    He has no past medical history of Arthritis, Cancer (H), Cerebral infarction (H), Congestive heart failure (H), COPD (chronic obstructive pulmonary disease) (H), Depressive disorder, Diabetes (H), Heart disease, History of blood transfusion, Hypertension, Thyroid disease, or Uncomplicated asthma.    SURGICAL HISTORY:    has a past surgical history that includes colonoscopy (2001); colonoscopy (2004); Colonoscopy (N/A, 1/29/2016); Colonoscopy with CO2 insufflation (N/A, 1/29/2016); Colonoscopy with CO2 insufflation (N/A, 4/19/2022); Colonoscopy (N/A, 4/19/2022); Bypass graft artery coronary (N/A, 11/16/2023); Coronary Angiogram (N/A, 11/9/2023); and Percutaneous Coronary Intervention (N/A, 11/9/2023).    SOCIAL HISTORY:    reports that he quit smoking about 8 years ago. His smoking use included cigarettes. He has a 32.00 pack-year smoking history. He has never been exposed to tobacco smoke. He has never used smokeless tobacco. He reports current alcohol use of about 10.0 standard drinks of alcohol per week. He reports that he does not use drugs.    FAMILY HISTORY: No bleeding/clotting disorders nor problems with anesthesia.     ALLERGIES:      Allergies   Allergen Reactions    Ampicillin        MEDICATIONS:  No current facility-administered medications on file prior to encounter.  acetaminophen (TYLENOL) 500 MG tablet, Take 2 tablets (1,000 mg) by mouth every 6 hours as needed for other (For optimal non-opioid multimodal pain management to improve pain control.)  aspirin (ASA) 81 MG chewable tablet, Take 2 tablets (162 mg) by mouth daily  guaiFENesin (MUCINEX) 600 MG 12 hr tablet, Take 1 tablet (600 mg) by mouth 2 times daily for 7 days  guaiFENesin (ROBITUSSIN) 20 mg/mL liquid, Take 10 mLs (200 mg) by mouth every 4 hours as needed for cough  levalbuterol (XOPENEX HFA) 45 MCG/ACT inhaler, Inhale 2 puffs into the lungs every 4 hours as needed for shortness of breath or  wheezing  methocarbamol (ROBAXIN) 750 MG tablet, Take 1 tablet (750 mg) by mouth every 6 hours as needed for muscle spasms or other (pain)  metoprolol succinate ER (TOPROL XL) 50 MG 24 hr tablet, Take 1.5 tablets (75 mg) by mouth daily  nitroGLYcerin (NITROSTAT) 0.4 MG sublingual tablet, For chest pain place 1 tablet under the tongue every 5 minutes for 3 doses. If symptoms persist 5 minutes after 1st dose call 911.  oxyCODONE (ROXICODONE) 5 MG tablet, Take 1 tablet (5 mg) by mouth every 6 hours as needed for severe pain  polyethylene glycol (MIRALAX) 17 GM/Dose powder, Take 17 g by mouth daily as needed for constipation  rosuvastatin (CRESTOR) 40 MG tablet, Take 1 tablet (40 mg) by mouth daily (Patient taking differently: Take 40 mg by mouth every morning)  senna-docusate (SENOKOT-S/PERICOLACE) 8.6-50 MG tablet, Take 2 tablets by mouth 2 times daily as needed for constipation  tamsulosin (FLOMAX) 0.4 MG capsule, Take 1 capsule (0.4 mg) by mouth daily (Patient taking differently: Take 0.4 mg by mouth every morning)        PHYSICAL EXAMINATION:  Temp:  [97.4  F (36.3  C)] 97.4  F (36.3  C)  Pulse:  [94-98] 94  Resp:  [14-20] 20  BP: (106-120)/(70-74) 106/70  SpO2:  [97 %] 97 %    General: Aox3, NAD  Resp: non labored breathing on room air. Soft expiratory wheeze present.   CV: RRR, normotensive. Midline incision healing without erythema or drainage. No sternal clicking.   Abdomen: soft, distended. Healing chest tube scars. No surrounding erythema or drainage.   Extremities: MARI, no pitting edema in BLE. No rashes. No jaundice.   Neuro: CN II-XII grossly intact. MARI.     LABS: Reviewed.   Arterial Blood Gases   No lab results found in last 7 days.  Complete Blood Count   Recent Labs   Lab 11/27/23  1557 11/21/23  0535   WBC 12.3* 13.1*   HGB 12.1* 11.0*   * 205     Basic Metabolic Panel  Recent Labs   Lab 11/27/23  1557 11/21/23  0750 11/21/23  0535     --  135   POTASSIUM 4.6  --  3.9   CHLORIDE 106  --   "98   CO2 22  --  26   BUN 15.1  --  20.1   CR 0.78  --  0.91   * 129* 127*     Liver Function Tests  Recent Labs   Lab 11/27/23  1557   AST 37   ALT 55   ALKPHOS 78   BILITOTAL 0.3   ALBUMIN 3.6   INR 1.24*     Pancreatic Enzymes  No lab results found in last 7 days.  Coagulation Profile  Recent Labs   Lab 11/27/23  1557   INR 1.24*     Lactate  Invalid input(s): \"LACTATE\"    IMAGING:  Recent Results (from the past 24 hour(s))   XR Chest 2 Views    Narrative    Chest 2 views    INDICATION: Chest pain    COMPARISON: 11/20/2023    FINDINGS: Heart size remains normal. Median sternotomy again evident  with intact cerclage wires. Left costophrenic angle blunting appears  similar. Decreased conspicuity of the linear density at the lateral  left lung field suggests decreased subsegmental atelectasis in this  area. No new opacities of suspicion.      Impression    IMPRESSION: Continued left pleural effusion versus lung base scarring  with decreased left lung subsegmental atelectasis. Prior median  sternotomy.    WILLIAMS BRAMBILA MD         SYSTEM ID:  [id number]     Patient seen and examined. Agree with plan.  "

## 2023-11-28 NOTE — DISCHARGE INSTRUCTIONS
You were seen in the emergency department due to pain in both of your armpits.  We evaluated here with blood work, EKGs, and a CAT scan which do not send show any signs of a cause of your symptoms.  The cardiothoracic surgeons believe that this is at least in part due to your postoperative pain.  They would recommend that you use over-the-counter pain medications for discomfort.  Otherwise, please follow-up with your primary care doctor going forward.  Return to the emergency department with any worsening severe pain, feeling as though you are going to pass out, or any other concerning symptoms

## 2023-11-28 NOTE — NURSING NOTE
"Aquiles Mckeon's goals for this visit include:   Chief Complaint   Patient presents with    Follow Up     Follow-up post angio, was seen in the ER on 11/27/23       PCP: Lyubov Almanza    Referring Provider:  No referring provider defined for this encounter.      Initial /67 (BP Location: Right arm, Patient Position: Sitting, Cuff Size: Adult Regular)   Pulse 105   Wt 89.5 kg (197 lb 6.4 oz)   SpO2 98%   BMI 31.86 kg/m   Estimated body mass index is 31.86 kg/m  as calculated from the following:    Height as of 11/27/23: 1.676 m (5' 6\").    Weight as of this encounter: 89.5 kg (197 lb 6.4 oz).    Medication Reconciliation: complete    Do you need any medication refills at today's visit? none    VENESSA Hodge  Rheumatology/Infectious disease  [facility]   [phone]      "

## 2023-11-30 ENCOUNTER — TELEPHONE (OUTPATIENT)
Dept: CARDIOLOGY | Facility: CLINIC | Age: 73
End: 2023-11-30
Payer: COMMERCIAL

## 2023-12-06 ENCOUNTER — HOSPITAL ENCOUNTER (OUTPATIENT)
Dept: CARDIAC REHAB | Facility: CLINIC | Age: 73
Discharge: HOME | End: 2023-12-06
Attending: PHYSICIAN ASSISTANT
Payer: COMMERCIAL

## 2023-12-06 DIAGNOSIS — Z95.1 S/P CABG (CORONARY ARTERY BYPASS GRAFT): ICD-10-CM

## 2023-12-06 PROCEDURE — 93798 PHYS/QHP OP CAR RHAB W/ECG: CPT

## 2023-12-06 PROCEDURE — 93797 PHYS/QHP OP CAR RHAB WO ECG: CPT | Mod: XU

## 2023-12-11 ENCOUNTER — OFFICE VISIT (OUTPATIENT)
Dept: FAMILY MEDICINE | Facility: CLINIC | Age: 73
End: 2023-12-11
Payer: COMMERCIAL

## 2023-12-11 VITALS
TEMPERATURE: 97.9 F | HEIGHT: 66 IN | SYSTOLIC BLOOD PRESSURE: 112 MMHG | WEIGHT: 191.4 LBS | BODY MASS INDEX: 30.76 KG/M2 | RESPIRATION RATE: 20 BRPM | HEART RATE: 83 BPM | DIASTOLIC BLOOD PRESSURE: 72 MMHG | OXYGEN SATURATION: 97 %

## 2023-12-11 DIAGNOSIS — R07.89 CHEST WALL PAIN: ICD-10-CM

## 2023-12-11 DIAGNOSIS — Z95.1 S/P CABG (CORONARY ARTERY BYPASS GRAFT): ICD-10-CM

## 2023-12-11 DIAGNOSIS — Z09 HOSPITAL DISCHARGE FOLLOW-UP: Primary | ICD-10-CM

## 2023-12-11 LAB
BASOPHILS # BLD AUTO: 0 10E3/UL (ref 0–0.2)
BASOPHILS NFR BLD AUTO: 0 %
EOSINOPHIL # BLD AUTO: 0.6 10E3/UL (ref 0–0.7)
EOSINOPHIL NFR BLD AUTO: 7 %
ERYTHROCYTE [DISTWIDTH] IN BLOOD BY AUTOMATED COUNT: 13 % (ref 10–15)
HCT VFR BLD AUTO: 41.8 % (ref 40–53)
HGB BLD-MCNC: 13.3 G/DL (ref 13.3–17.7)
IMM GRANULOCYTES # BLD: 0 10E3/UL
IMM GRANULOCYTES NFR BLD: 0 %
LYMPHOCYTES # BLD AUTO: 1.6 10E3/UL (ref 0.8–5.3)
LYMPHOCYTES NFR BLD AUTO: 19 %
MCH RBC QN AUTO: 29.5 PG (ref 26.5–33)
MCHC RBC AUTO-ENTMCNC: 31.8 G/DL (ref 31.5–36.5)
MCV RBC AUTO: 93 FL (ref 78–100)
MONOCYTES # BLD AUTO: 0.8 10E3/UL (ref 0–1.3)
MONOCYTES NFR BLD AUTO: 9 %
NEUTROPHILS # BLD AUTO: 5.2 10E3/UL (ref 1.6–8.3)
NEUTROPHILS NFR BLD AUTO: 64 %
PLATELET # BLD AUTO: 251 10E3/UL (ref 150–450)
RBC # BLD AUTO: 4.51 10E6/UL (ref 4.4–5.9)
WBC # BLD AUTO: 8.1 10E3/UL (ref 4–11)

## 2023-12-11 PROCEDURE — 99495 TRANSJ CARE MGMT MOD F2F 14D: CPT | Performed by: PHYSICIAN ASSISTANT

## 2023-12-11 PROCEDURE — 85025 COMPLETE CBC W/AUTO DIFF WBC: CPT | Performed by: PHYSICIAN ASSISTANT

## 2023-12-11 PROCEDURE — 36415 COLL VENOUS BLD VENIPUNCTURE: CPT | Performed by: PHYSICIAN ASSISTANT

## 2023-12-11 RX ORDER — LEVALBUTEROL TARTRATE 45 UG/1
2 AEROSOL, METERED ORAL EVERY 4 HOURS PRN
Qty: 15 G | Refills: 1 | Status: SHIPPED | OUTPATIENT
Start: 2023-12-11

## 2023-12-11 RX ORDER — OXYCODONE HYDROCHLORIDE 5 MG/1
5 TABLET ORAL
Qty: 10 TABLET | Refills: 0 | Status: SHIPPED | OUTPATIENT
Start: 2023-12-11

## 2023-12-11 ASSESSMENT — PAIN SCALES - GENERAL: PAINLEVEL: MODERATE PAIN (4)

## 2023-12-11 NOTE — PROGRESS NOTES
Assessment & Plan     Hospital discharge follow-up  S/P CABG (coronary artery bypass graft)  He is having gradual improvement  Continue on current medications.   He will use up supply of crestor 20mg pills taking 2 for 40mg dose, then he does have new rx for the 40mg script.   Encouraged to reschedule the appt he cancelled with his surgery team  Continue with cardiac rehab- he would like to move his cardiac rehab to [facility] if that's an option.  Did refill small amount of oxycodone for comfort at bedtime.   Incisions look great. Use eucerin for the dry skin/itching.   Refilled inhaler for prn use. Finding benefit.   Cbc - post-op blood loss anemia is resolved, leukocytosis is resolved. Platelets normal.   - oxyCODONE (ROXICODONE) 5 MG tablet; Take 1 tablet (5 mg) by mouth nightly as needed for moderate to severe pain (post-op pain)  - levalbuterol (XOPENEX HFA) 45 MCG/ACT inhaler; Inhale 2 puffs into the lungs every 4 hours as needed for shortness of breath or wheezing  - CBC with platelets and differential; Future  - CBC with platelets and differential    Chest wall pain  For use at bedtime for comfort with sleeping. Narcotics precautions discussed.   - oxyCODONE (ROXICODONE) 5 MG tablet; Take 1 tablet (5 mg) by mouth nightly as needed for moderate to severe pain (post-op pain)     MED REC REQUIRED  Post Medication Reconciliation Status: discharge medications reconciled, continue medications without change  Follow Up: see above. Additionally patient was instructed to contact clinic for worsening symptoms, non-improvement in time frame discussed, and for questions regarding treatment plan.   For virtual visits, the patient was advised to be seen for in person evaluation if symptoms or condition are worsening or non-improvement as expected.       Lyubov Almanza PA-C  [facility] ARELIS Garber is a 73 year old, presenting for the following health issues:  Hospital F/U       "12/11/2023    10:56 AM   Additional Questions   Roomed by Hermila FUENTES   Accompanied by None         12/11/2023    10:56 AM   Patient Reported Additional Medications   Patient reports taking the following new medications NA     Pt is wondering which dose of Crestor and asprin he should be taking   Pt is wondering when he can start sleeping on his side         HPI     Hospital Follow-up Visit:    Hospital/Nursing Home/IP Rehab Facility: [facility]  Date of Admission: 11/16/23  Date of Discharge: 11/21/23  Reason(s) for Admission: S/P CABG (coronary artery bypass graftx2)      Was your hospitalization related to COVID-19? No   Problems taking medications regularly:  None  Medication changes since discharge: None  Problems adhering to non-medication therapy:  None    Summary of hospitalization:  [facility] hospital discharge summary reviewed  Diagnostic Tests/Treatments reviewed.  Follow up needed: none  Other Healthcare Providers Involved in Patient s Care:         Specialist appointment - surgery and cardiac rehab  Update since discharge: improved.         Plan of care communicated with patient           Multivessel CAD S/p CABG x2-   Pt was seen for progressive OLIVO and bilateral \"armpit pain\". Had abnormal stress echo and cath showing severe multivessel CAD. He underwent CABG x2, left Internal Mammary Artery [place], Endoscopic [place] of Left Greater Saphenous Vein on 11/16/23 by Dr. Mulvihill.      Post-op chest wall pain is tolerable until bedtime. Can't get comfortable. Pain is upper back pain because - they want me to sleep on my back and I don't sleep on my back. Waking at night and up frequently. Can't get comfortable.   Oxycodone - has been out. But was appreciative for being able to take at bedtime when needed for sleep.  Robaxin - not too helpful but is trying at bedtime.  Tylenol 1000mg every 4-6 hr. Not overly helpful.   Trying icy-hot also topically.    " "  Saw cardiology 11/28/23- Virginia Kemp PA-C  Started Cardiac rehab- 12/6/23- I don't like that place. He would prefer to go to [facility].   Was supposed to do follow up with surgical team on Friday but cancelled that due to having daughter in town from out of state. He states he thought they would call him to reschedule but they didn't. He did send a my chart message.     New meds-   Patient discharged on aspirin:  Yes 162 mg  Patient discharged on beta blocker: yes- metoprolol XL 75mg daily     Patient discharged on ACE Inhibitor/ARB: no      Patient discharged on statin: yes - crestor 40mg daily (he is taking 2 of the 20mg tabs in the bottle he has at home for now)    Was seen in the ER for a return of the armpit pain 11/27/23. They ruled out complications from surgery, PE, etc.   Chest wall pain/back pain as above.   His breathing is improving slowly. He is taking the mucinex and the xopenex. Those things help.   Incisions- look good he thinks. Itchy along side the main sternotomy incision.   No leg swelling.   No urinary sx.   Bowels regular.  No fevers at all.   Weight is down ~ 10 lbs or so.   Feels his mood is good/fine.       Review of Systems   Constitutional, HEENT, cardiovascular, pulmonary, gi and gu systems are negative, except as otherwise noted.      Objective    /72 (BP Location: Left arm, Patient Position: Chair, Cuff Size: Adult Regular)   Pulse 83   Temp 97.9  F (36.6  C) (Temporal)   Resp 20   Ht 1.675 m (5' 5.95\")   Wt 86.8 kg (191 lb 6.4 oz)   SpO2 97%   BMI 30.94 kg/m    Body mass index is 30.94 kg/m .  Physical Exam   GENERAL: healthy, alert and no distress  EYES: Eyes grossly normal to inspection, PERRL and conjunctivae and sclerae normal  HENT: ear canals and TM's normal, nose and mouth without ulcers or lesions  NECK: no adenopathy, no asymmetry, masses, or scars and thyroid normal to palpation  RESP: lungs clear to auscultation - no rales, rhonchi or wheezes  Chest: sternotomy " incision healing , no erythema, drainage or tenderness. Few light pink papules few cm away from incision along edges of lower portion of incision. Abdominal incisions with scab, no redness, drainage or tenderness.    CV: regular rate and rhythm, normal S1 S2, no S3 or S4, no murmur, click or rub, no peripheral edema and peripheral pulses strong  ABDOMEN: soft, nontender, no hepatosplenomegaly, no masses and bowel sounds normal  MS: no gross musculoskeletal defects noted, no edema. Left lower leg- medial - vein site incision healing nicely. No erythema or drainage.   SKIN: no suspicious lesions or rashes.   NEURO: Normal strength and tone, mentation intact and speech normal  PSYCH: mentation appears normal, affect normal    Results for orders placed or performed in visit on 12/11/23   CBC with platelets and differential     Status: None   Result Value Ref Range    WBC Count 8.1 4.0 - 11.0 10e3/uL    RBC Count 4.51 4.40 - 5.90 10e6/uL    Hemoglobin 13.3 13.3 - 17.7 g/dL    Hematocrit 41.8 40.0 - 53.0 %    MCV 93 78 - 100 fL    MCH 29.5 26.5 - 33.0 pg    MCHC 31.8 31.5 - 36.5 g/dL    RDW 13.0 10.0 - 15.0 %    Platelet Count 251 150 - 450 10e3/uL    % Neutrophils 64 %    % Lymphocytes 19 %    % Monocytes 9 %    % Eosinophils 7 %    % Basophils 0 %    % Immature Granulocytes 0 %    Absolute Neutrophils 5.2 1.6 - 8.3 10e3/uL    Absolute Lymphocytes 1.6 0.8 - 5.3 10e3/uL    Absolute Monocytes 0.8 0.0 - 1.3 10e3/uL    Absolute Eosinophils 0.6 0.0 - 0.7 10e3/uL    Absolute Basophils 0.0 0.0 - 0.2 10e3/uL    Absolute Immature Granulocytes 0.0 <=0.4 10e3/uL   CBC with platelets and differential     Status: None    Narrative    The following orders were created for panel order CBC with platelets and differential.  Procedure                               Abnormality         Status                     ---------                               -----------         ------                     CBC with platelets and d...[[id number]]                       Final result                 Please view results for these tests on the individual orders.

## 2023-12-11 NOTE — PATIENT INSTRUCTIONS
Talk with cardiac rehab tomorrow about trying to transfer your sessions to [facility].     Continue on your current medications    Your incisions look good!   Use a thick lotion /cream to the skin for itching. Eucrin cream - over the counter scoopable cream-like lotion along edges where it itches.    Did refill the oxycodone for sleeping at night for the chest wall pain    I sent a refill of the xopenex to your pharmacy     Do reschedule with the surgical team.     Oxycodone   The medication you were prescribed, is a narcotic or contains a narcotic. Narcotics can cause dizzy or drowsiness. You should not drive or operate machinery while taking this medication. Narcotics can cause constipation. If you will be taking this medication for more than a few days, add Colace (docusate sodium) 100mg twice daily to keep stools soft or Miralax 1 cap daily dissolved into a beverage of your choice.

## 2023-12-11 NOTE — PROGRESS NOTES
"  {PROVIDER CHARTING PREFERENCE:231979}    Subjective   Jcarlos is a 73 year old, presenting for the following health issues:  Hospital F/U        12/11/2023    10:56 AM   Additional Questions   Roomed by Hermlia FUENTES   Accompanied by None         12/11/2023    10:56 AM   Patient Reported Additional Medications   Patient reports taking the following new medications NA     Pt is wondering which dose of Crestor and asprin he should be taking   Pt is wondering when he can start sleeping on his side     HPI     {MA/LPN/RN Pre-Provider Visit Orders- hCG/UA/Strep (Optional):581694}    ED/UC Followup:    Facility:  [facility]  Date of visit: 11/27/23  Reason for visit: side pain   Current Status: stable     {additonal problems for provider to add (Optional):817883}      Review of Systems   {ROS COMP (Optional):187727}      Objective    /72 (BP Location: Left arm, Patient Position: Chair, Cuff Size: Adult Regular)   Pulse 83   Temp 97.9  F (36.6  C) (Temporal)   Resp 20   Ht 1.675 m (5' 5.95\")   Wt 86.8 kg (191 lb 6.4 oz)   SpO2 97%   BMI 30.94 kg/m    Body mass index is 30.94 kg/m .  Physical Exam   {Exam List (Optional):930070}    {Diagnostic Test Results (Optional):834881}    {AMBULATORY ATTESTATION (Optional):193420}              "

## 2023-12-12 ENCOUNTER — HOSPITAL ENCOUNTER (OUTPATIENT)
Dept: CARDIAC REHAB | Facility: CLINIC | Age: 73
Discharge: HOME | End: 2023-12-12
Attending: PHYSICIAN ASSISTANT
Payer: COMMERCIAL

## 2023-12-12 PROCEDURE — 93798 PHYS/QHP OP CAR RHAB W/ECG: CPT

## 2023-12-13 ENCOUNTER — TELEPHONE (OUTPATIENT)
Dept: FAMILY MEDICINE | Facility: CLINIC | Age: 73
End: 2023-12-13
Payer: COMMERCIAL

## 2023-12-13 NOTE — TELEPHONE ENCOUNTER
Called pt. He had questions about a handicap parking pass. Question was more to navigate the ramp and [address] vs ability to walk into and out of building.   He is doing well at cardiac rehab. He thinks he will cont to do at the [facility]. Lyubov Almanza PA-C

## 2023-12-14 ENCOUNTER — HOSPITAL ENCOUNTER (OUTPATIENT)
Dept: CARDIAC REHAB | Facility: CLINIC | Age: 73
Discharge: HOME | End: 2023-12-14
Attending: PHYSICIAN ASSISTANT
Payer: COMMERCIAL

## 2023-12-14 PROCEDURE — 93798 PHYS/QHP OP CAR RHAB W/ECG: CPT

## 2023-12-18 NOTE — PROGRESS NOTES
CARDIOTHORACIC SURGERY FOLLOW-UP VISIT     Aquiles Mckeon   1950   [id number]      Reason for visit: Post-op clinic visit    ASSESSMENT/PLAN:  Aquiles Mckeon is a 73 year old male who is status post CABG with Dr. Rhodes on 11/16/23         Primary Diagnoses:   Multivessel CAD s/p CABG x2          Secondary Diagnoses:   Acute postoperative pain, improving  Stress induced hyperglycemia, resolved  Stress induced leukocytosis, resolved  Acute blood loss anemia, stable  Acute blood loss thrombocytopenia, resolved  Hypervolemia, resolved    Surgically doing well at home. Pain is overall controlled. Midline sternal incision healing well without signs of infection. Increasing activity and strength.  Sounds to be in a regular rhythm with HR <100 bpm.  Appears  euvolemic.    Hemodynamics are stable. Medications reviewed and no changes were needed today.  Follow up with your PCP and cardiologist as scheduled.  Continue Outpatient Cardiac Rehab until completed. Had therapy this morning and going well. Increasing weights.   Continue sternal precautions for 12 weeks from surgery date.   Ok to start driving as now 4 weeks from surgery date.     Postoperative restrictions have been reviewed and all of the patient's questions were answered. Our contact information was given to the patient if he should have any further questions/concerns. No further follow up is needed with us.  The total time spent with the patient was 25 minutes, > 50% of which was spent in counseling and coordination of care.    Neena Samson PA-C   __________________________________________________________________________________    HPI: Aquiles Mckeon is a 73 year old male with a PMH of severe multivessel CAD, prediabetes, HLD.  Presents to clinic for a follow-up appointment after surgery. Hospital course was unremarkable. Patient was discharged home on 11/21/23.    Mr. Mckeon went into the ED with complaints of increasing bilateral axillary pain  similar to what he experienced prior to his CABG. All testing was unremarkable and he was sent home.    Since discharge, has been recovering well.  States pain is controlled. Patient continues to need Tylenol for pain management. Sleep is going ok. Participating in therapy at [facility].   Breathing has been stable/improving. Continues to work on [employer], able to reach 1700 on IS. Denies SOB at rest, PND, orthopnea. Non-productive cough.   Patient endorses none Patient denies any fever, chills, chest pain, palpitations, edema, SOB, lightheadedness, nausea, and vomiting.    Has had a good appetite. Having regular bowel movements and voiding without difficulty.   Denies sternal popping or clicking or incisional drainage/erythema. No psychiatric concerns.      PAST MEDICAL HISTORY:  Past Medical History:   Diagnosis Date    ED (erectile dysfunction)     Hx of colonic polyp 01/01/2001       PAST SURGICAL HISTORY:  Past Surgical History:   Procedure Laterality Date    BYPASS GRAFT ARTERY CORONARY N/A 11/16/2023    Procedure: Median Sternotomy, Cardiopulmonary Bypass, Intraoperative Transesophageal Echocardiogram, Coronary Artery Bypass Graft x 2, Left Internal Mammary Artery [place], Endoscopic [place] of Left Greater Saphenous Vein, Intraoperative Epi-Aortic Untrasound;  Surgeon: Mulvihill, Michael, MD;  Location: [facility]    COLONOSCOPY  2001    Reported as having a polyp removed    COLONOSCOPY  2004    Clear by report    COLONOSCOPY N/A 1/29/2016    Procedure: COLONOSCOPY;  Surgeon: Juanjo Martínez MD;  Location: [facility] OR    COLONOSCOPY N/A 4/19/2022    Procedure: COLONOSCOPY, FLEXIBLE, WITH LESION REMOVAL USING SNARE;  Surgeon: Jian Mixon, DO;  Location: [facility]    COLONOSCOPY WITH CO2 INSUFFLATION N/A 1/29/2016    Procedure: COLONOSCOPY WITH CO2 INSUFFLATION;  Surgeon: Juanjo Martínez MD;  Location: [facility]    COLONOSCOPY WITH CO2 INSUFFLATION N/A 4/19/2022    Procedure: COLONOSCOPY, WITH  CO2 INSUFFLATION;  Surgeon: Jian Mixon DO;  Location: [facility]    CV CORONARY ANGIOGRAM N/A 11/9/2023    Procedure: Coronary Angiogram;  Surgeon: Channing Marin MD;  Location:  [facility] CARDIAC CATH LAB    CV PCI N/A 11/9/2023    Procedure: Percutaneous Coronary Intervention;  Surgeon: Channing Marin MD;  [facility]       CURRENT MEDICATIONS:   Current Outpatient Medications   Medication    acetaminophen (TYLENOL) 500 MG tablet    aspirin (ASA) 81 MG chewable tablet    levalbuterol (XOPENEX HFA) 45 MCG/ACT inhaler    methocarbamol (ROBAXIN) 750 MG tablet    metoprolol succinate ER (TOPROL XL) 50 MG 24 hr tablet    nitroGLYcerin (NITROSTAT) 0.4 MG sublingual tablet    oxyCODONE (ROXICODONE) 5 MG tablet    rosuvastatin (CRESTOR) 40 MG tablet    tamsulosin (FLOMAX) 0.4 MG capsule     No current facility-administered medications for this visit.       ALLERGIES:      Allergies   Allergen Reactions    Ampicillin        ROS:  Review of symptoms otherwise negative unless commented about in HPI.     LABS:  Last Basic Metabolic Panel:  Lab Results   Component Value Date     11/27/2023     03/11/2021      Lab Results   Component Value Date    POTASSIUM 4.6 11/27/2023    POTASSIUM 3.6 11/16/2023    POTASSIUM 4.7 02/20/2023    POTASSIUM 4.8 03/11/2021     Lab Results   Component Value Date    CHLORIDE 106 11/27/2023    CHLORIDE 109 02/20/2023    CHLORIDE 107 03/11/2021     Lab Results   Component Value Date    DANICA 8.8 11/27/2023    DANICA 8.4 03/11/2021     Lab Results   Component Value Date    CO2 22 11/27/2023    CO2 28 02/20/2023    CO2 27 03/11/2021     Lab Results   Component Value Date    BUN 15.1 11/27/2023    BUN 19 02/20/2023    BUN 22 03/11/2021     Lab Results   Component Value Date    CR 0.78 11/27/2023    CR 1.10 03/11/2021     Lab Results   Component Value Date     11/27/2023     11/21/2023    GLC 94 02/20/2023    GLC 97 03/11/2021       Last  "CBC:   Lab Results   Component Value Date    WBC 8.1 12/11/2023    WBC 7.4 01/23/2020     Lab Results   Component Value Date    RBC 4.51 12/11/2023    RBC 5.11 01/23/2020     Lab Results   Component Value Date    HGB 13.3 12/11/2023    HGB 15.2 01/23/2020     Lab Results   Component Value Date    HCT 41.8 12/11/2023    HCT 46.0 01/23/2020     No components found for: \"MCT\"  Lab Results   Component Value Date    MCV 93 12/11/2023    MCV 90 01/23/2020     Lab Results   Component Value Date    MCH 29.5 12/11/2023    MCH 29.7 01/23/2020     Lab Results   Component Value Date    MCHC 31.8 12/11/2023    MCHC 33.0 01/23/2020     Lab Results   Component Value Date    RDW 13.0 12/11/2023    RDW 13.8 01/23/2020     Lab Results   Component Value Date     12/11/2023     01/23/2020       INR:  Lab Results   Component Value Date    INR 1.24 11/27/2023    INR 1.44 11/16/2023    INR 1.68 11/16/2023    INR 1.12 11/09/2023       IMAGING:  None    PHYSICAL EXAM:   /78 (BP Location: Right arm, Patient Position: Chair, Cuff Size: Adult Regular)   Pulse 89   Wt 88.8 kg (195 lb 12.8 oz)   SpO2 99%   BMI 31.66 kg/m      General: alert and oriented x 3, pleasant, no acute distress, normal mood and affect  Neuro: no focal deficits   CV: S1 S2, no murmurs, rubs or gallops, regular rate and rhythm, no peripheral edema  Pulm: bilateral breath sounds, clear to auscultation, easy work of breathing  Incision: incisions clean dry and intact without erythema, swelling or drainage    PROCEDURES:  None       CC  Lyubov Almanza            "

## 2023-12-19 ENCOUNTER — OFFICE VISIT (OUTPATIENT)
Dept: CARDIOLOGY | Facility: CLINIC | Age: 73
End: 2023-12-19
Attending: STUDENT IN AN ORGANIZED HEALTH CARE EDUCATION/TRAINING PROGRAM
Payer: COMMERCIAL

## 2023-12-19 ENCOUNTER — HOSPITAL ENCOUNTER (OUTPATIENT)
Dept: CARDIAC REHAB | Facility: CLINIC | Age: 73
Discharge: HOME | End: 2023-12-19
Attending: PHYSICIAN ASSISTANT
Payer: COMMERCIAL

## 2023-12-19 VITALS
SYSTOLIC BLOOD PRESSURE: 110 MMHG | DIASTOLIC BLOOD PRESSURE: 78 MMHG | WEIGHT: 195.8 LBS | BODY MASS INDEX: 31.66 KG/M2 | HEART RATE: 89 BPM | OXYGEN SATURATION: 99 %

## 2023-12-19 DIAGNOSIS — Z95.1 S/P CABG (CORONARY ARTERY BYPASS GRAFT): Primary | ICD-10-CM

## 2023-12-19 PROCEDURE — 99024 POSTOP FOLLOW-UP VISIT: CPT | Performed by: PHYSICIAN ASSISTANT

## 2023-12-19 PROCEDURE — 99213 OFFICE O/P EST LOW 20 MIN: CPT

## 2023-12-19 PROCEDURE — 93798 PHYS/QHP OP CAR RHAB W/ECG: CPT

## 2023-12-19 RX ORDER — GUAIFENESIN 600 MG/1
1200 TABLET, EXTENDED RELEASE ORAL 2 TIMES DAILY
Qty: 30 TABLET | Refills: 1 | Status: SHIPPED | OUTPATIENT
Start: 2023-12-19

## 2023-12-19 ASSESSMENT — PAIN SCALES - GENERAL: PAINLEVEL: NO PAIN (0)

## 2023-12-19 NOTE — PATIENT INSTRUCTIONS
Your surgery was on 11/16/2023.    Ok to start driving now as it has been over four weeks after the date of surgery.    From the date of surgery: no lifting greater than 10 pounds for 6 weeks, then no lifting greater than 20 pounds for an additional 6 weeks, and together this will total 12 weeks from surgery.    Do not soak your incisions until fully healed over with no scabbing; this includes hot tubs, baths, pools etc.    If you have questions or concerns, please call us:    Apolinar Reyes, RN Care Coordinator - [phone]   Alexandra Longoria, RN Care Coordinator - [phone]  Millie Ferrari, RN Care Coordinator - [phone]

## 2023-12-19 NOTE — LETTER
12/19/2023      RE: Aquiles Mckeon  [address] [address] [facility] [id number]       Dear Colleague,    Thank you for the opportunity to participate in the care of your patient, Aquiles Mckeon, at the [facility]. Please see a copy of my visit note below.    CARDIOTHORACIC SURGERY FOLLOW-UP VISIT     Aquiles Mckeon   1950   [id number]      Reason for visit: Post-op clinic visit    ASSESSMENT/PLAN:  Aquiles Mckeon is a 73 year old male who is status post CABG with Dr. Rhodes on 11/16/23         Primary Diagnoses:   Multivessel CAD s/p CABG x2          Secondary Diagnoses:   Acute postoperative pain, improving  Stress induced hyperglycemia, resolved  Stress induced leukocytosis, resolved  Acute blood loss anemia, stable  Acute blood loss thrombocytopenia, resolved  Hypervolemia, resolved    Surgically doing well at home. Pain is overall controlled. Midline sternal incision healing well without signs of infection. Increasing activity and strength.  Sounds to be in a regular rhythm with HR <100 bpm.  Appears  euvolemic.    Hemodynamics are stable. Medications reviewed and no changes were needed today.  Follow up with your PCP and cardiologist as scheduled.  Continue Outpatient Cardiac Rehab until completed. Had therapy this morning and going well. Increasing weights.   Continue sternal precautions for 12 weeks from surgery date.   Ok to start driving as now 4 weeks from surgery date.     Postoperative restrictions have been reviewed and all of the patient's questions were answered. Our contact information was given to the patient if he should have any further questions/concerns. No further follow up is needed with us.  The total time spent with the patient was 25 minutes, > 50% of which was spent in counseling and coordination of care.    Neena Samson PA-C    __________________________________________________________________________________    HPI: Aquiles Mckeon is a 73 year old male with a PMH of severe multivessel CAD, prediabetes, HLD.  Presents to clinic for a follow-up appointment after surgery. Hospital course was unremarkable. Patient was discharged home on 11/21/23.    Mr. Mckeon went into the ED with complaints of increasing bilateral axillary pain similar to what he experienced prior to his CABG. All testing was unremarkable and he was sent home.    Since discharge, has been recovering well.  States pain is controlled. Patient continues to need Tylenol for pain management. Sleep is going ok. Participating in therapy at [facility].   Breathing has been stable/improving. Continues to work on [employer], able to reach 1700 on IS. Denies SOB at rest, PND, orthopnea. Non-productive cough.   Patient endorses none Patient denies any fever, chills, chest pain, palpitations, edema, SOB, lightheadedness, nausea, and vomiting.    Has had a good appetite. Having regular bowel movements and voiding without difficulty.   Denies sternal popping or clicking or incisional drainage/erythema. No psychiatric concerns.      PAST MEDICAL HISTORY:  Past Medical History:   Diagnosis Date    ED (erectile dysfunction)     Hx of colonic polyp 01/01/2001       PAST SURGICAL HISTORY:  Past Surgical History:   Procedure Laterality Date    BYPASS GRAFT ARTERY CORONARY N/A 11/16/2023    Procedure: Median Sternotomy, Cardiopulmonary Bypass, Intraoperative Transesophageal Echocardiogram, Coronary Artery Bypass Graft x 2, Left Internal Mammary Artery [place], Endoscopic [place] of Left Greater Saphenous Vein, Intraoperative Epi-Aortic Untrasound;  Surgeon: Mulvihill, Michael, MD;  Location: [facility] OR    COLONOSCOPY  2001    Reported as having a polyp removed    COLONOSCOPY  2004    Clear by report    COLONOSCOPY N/A 1/29/2016    Procedure: COLONOSCOPY;  Surgeon: Juanjo Martínez MD;   Location: [facility]    COLONOSCOPY N/A 4/19/2022    Procedure: COLONOSCOPY, FLEXIBLE, WITH LESION REMOVAL USING SNARE;  Surgeon: Jian Mixon DO;  Location: [facility]    COLONOSCOPY WITH CO2 INSUFFLATION N/A 1/29/2016    Procedure: COLONOSCOPY WITH CO2 INSUFFLATION;  Surgeon: Juanjo Martínez MD;  Location[facility]    COLONOSCOPY WITH CO2 INSUFFLATION N/A 4/19/2022    Procedure: COLONOSCOPY, WITH CO2 INSUFFLATION;  Surgeon: Jian Mixon DO;  Location: [facility]    CV CORONARY ANGIOGRAM N/A 11/9/2023    Procedure: Coronary Angiogram;  Surgeon: Channing Marin MD;  Location:  [facility] CARDI[facility] CATH LAB    CV PCI N/A 11/9/2023    Procedure: Percutaneous Coronary Intervention;  Surgeon: Channing Marin MD;  Location:  [facility] CARD[facility] CATH LAB       CURRENT MEDICATIONS:   Current Outpatient Medications   Medication    acetaminophen (TYLENOL) 500 MG tablet    aspirin (ASA) 81 MG chewable tablet    levalbuterol (XOPENEX HFA) 45 MCG/ACT inhaler    methocarbamol (ROBAXIN) 750 MG tablet    metoprolol succinate ER (TOPROL XL) 50 MG 24 hr tablet    nitroGLYcerin (NITROSTAT) 0.4 MG sublingual tablet    oxyCODONE (ROXICODONE) 5 MG tablet    rosuvastatin (CRESTOR) 40 MG tablet    tamsulosin (FLOMAX) 0.4 MG capsule     No current facility-administered medications for this visit.       ALLERGIES:      Allergies   Allergen Reactions    Ampicillin        ROS:  Review of symptoms otherwise negative unless commented about in HPI.     LABS:  Last Basic Metabolic Panel:  Lab Results   Component Value Date     11/27/2023     03/11/2021      Lab Results   Component Value Date    POTASSIUM 4.6 11/27/2023    POTASSIUM 3.6 11/16/2023    POTASSIUM 4.7 02/20/2023    POTASSIUM 4.8 03/11/2021     Lab Results   Component Value Date    CHLORIDE 106 11/27/2023    CHLORIDE 109 02/20/2023    CHLORIDE 107 03/11/2021     Lab Results   Component Value Date    DANICA 8.8 11/27/2023    DANICA 8.4 03/11/2021     Lab  "Results   Component Value Date    CO2 22 11/27/2023    CO2 28 02/20/2023    CO2 27 03/11/2021     Lab Results   Component Value Date    BUN 15.1 11/27/2023    BUN 19 02/20/2023    BUN 22 03/11/2021     Lab Results   Component Value Date    CR 0.78 11/27/2023    CR 1.10 03/11/2021     Lab Results   Component Value Date     11/27/2023     11/21/2023    GLC 94 02/20/2023    GLC 97 03/11/2021       Last CBC:   Lab Results   Component Value Date    WBC 8.1 12/11/2023    WBC 7.4 01/23/2020     Lab Results   Component Value Date    RBC 4.51 12/11/2023    RBC 5.11 01/23/2020     Lab Results   Component Value Date    HGB 13.3 12/11/2023    HGB 15.2 01/23/2020     Lab Results   Component Value Date    HCT 41.8 12/11/2023    HCT 46.0 01/23/2020     No components found for: \"MCT\"  Lab Results   Component Value Date    MCV 93 12/11/2023    MCV 90 01/23/2020     Lab Results   Component Value Date    MCH 29.5 12/11/2023    MCH 29.7 01/23/2020     Lab Results   Component Value Date    MCHC 31.8 12/11/2023    MCHC 33.0 01/23/2020     Lab Results   Component Value Date    RDW 13.0 12/11/2023    RDW 13.8 01/23/2020     Lab Results   Component Value Date     12/11/2023     01/23/2020       INR:  Lab Results   Component Value Date    INR 1.24 11/27/2023    INR 1.44 11/16/2023    INR 1.68 11/16/2023    INR 1.12 11/09/2023       IMAGING:  None    PHYSICAL EXAM:   /78 (BP Location: Right arm, Patient Position: Chair, Cuff Size: Adult Regular)   Pulse 89   Wt 88.8 kg (195 lb 12.8 oz)   SpO2 99%   BMI 31.66 kg/m      General: alert and oriented x 3, pleasant, no acute distress, normal mood and affect  Neuro: no focal deficits   CV: S1 S2, no murmurs, rubs or gallops, regular rate and rhythm, no peripheral edema  Pulm: bilateral breath sounds, clear to auscultation, easy work of breathing  Incision: incisions clean dry and intact without erythema, swelling or drainage    PROCEDURES:  None       KELY Mcarthur" CAROLE Almanza                Please do not hesitate to contact me if you have any questions/concerns.     Sincerely,     Cardiovascular Thoracic Surgery

## 2023-12-19 NOTE — NURSING NOTE
Chief Complaint   Patient presents with    Follow Up     Post- Op - Mulvihill CAB 11/16     Vitals were taken and medications reconciled.    Michael Cage, EMT  1:00 PM

## 2023-12-26 ENCOUNTER — HOSPITAL ENCOUNTER (OUTPATIENT)
Dept: CARDIAC REHAB | Facility: CLINIC | Age: 73
Discharge: HOME | End: 2023-12-26
Attending: PHYSICIAN ASSISTANT
Payer: COMMERCIAL

## 2023-12-26 ENCOUNTER — TELEPHONE (OUTPATIENT)
Dept: CARDIOLOGY | Facility: CLINIC | Age: 73
End: 2023-12-26
Payer: COMMERCIAL

## 2023-12-26 DIAGNOSIS — Z95.1 S/P CABG (CORONARY ARTERY BYPASS GRAFT): Primary | ICD-10-CM

## 2023-12-26 PROCEDURE — 999N000104 HC STATISTIC NO CHARGE

## 2023-12-26 NOTE — TELEPHONE ENCOUNTER
Patient s/p CABG with Dr. Mulvihill on 11/16/23, during hospitalization patient pulled one wire in the sternum, the most distal wire, which is intact but not attached to two sides of sternum, only one.  Patient called to report increase in sternal clicking in the distal sternum. Clicking appears spontaneously at random times; reports that it occurs more frequently in the morning. Patient reports that he sleeps on the side at night. Patient was informed to sleep on his back to alleviate pressure on the sternum which can disrupt healing.   Aide LEE was consulted regarding the information above and recommends CT Chest w/o contrast to evaluate sternal incision. Patient verbalized understanding and agreed to the plan.

## 2023-12-27 ENCOUNTER — ANCILLARY PROCEDURE (OUTPATIENT)
Dept: CT IMAGING | Facility: CLINIC | Age: 73
End: 2023-12-27
Attending: STUDENT IN AN ORGANIZED HEALTH CARE EDUCATION/TRAINING PROGRAM
Payer: COMMERCIAL

## 2023-12-27 DIAGNOSIS — Z95.1 S/P CABG (CORONARY ARTERY BYPASS GRAFT): ICD-10-CM

## 2023-12-27 PROCEDURE — 71250 CT THORAX DX C-: CPT | Mod: GC | Performed by: RADIOLOGY

## 2023-12-27 NOTE — TELEPHONE ENCOUNTER
Patient called regarding recent sternal clicking; patient had a CT Chest done on 12/27/23, results were reviewed by Aide LEE and Dr. Mulvihill. Recommended patient follow sternal precautions no lifting grater than 10 lbs first 6 weeks, no greater than 20 lbs 6 weeks after, sleep on his back first 4-6 weeks; no driving first 4 weeks following surgical date of 11/16/23. Patient informed to monitor the area and report increase in pain, redness or any other discomfort. Patient verbalized understanding and agreed to the plan.

## 2024-02-22 ENCOUNTER — PATIENT OUTREACH (OUTPATIENT)
Dept: GASTROENTEROLOGY | Facility: CLINIC | Age: 74
End: 2024-02-22
Payer: COMMERCIAL

## 2024-02-22 DIAGNOSIS — Z95.1 S/P CABG (CORONARY ARTERY BYPASS GRAFT): ICD-10-CM

## 2024-02-22 RX ORDER — METOPROLOL SUCCINATE 50 MG/1
75 TABLET, EXTENDED RELEASE ORAL DAILY
Qty: 45 TABLET | Refills: 0 | Status: SHIPPED | OUTPATIENT
Start: 2024-02-22 | End: 2024-03-20

## 2024-02-23 NOTE — TELEPHONE ENCOUNTER
Called patient  Informed him he will need to be seen for future refills   He is not wanting to set up an appointment at this time

## 2024-03-20 DIAGNOSIS — Z95.1 S/P CABG (CORONARY ARTERY BYPASS GRAFT): ICD-10-CM

## 2024-03-21 RX ORDER — METOPROLOL SUCCINATE 50 MG/1
75 TABLET, EXTENDED RELEASE ORAL DAILY
Qty: 135 TABLET | Refills: 0 | Status: SHIPPED | OUTPATIENT
Start: 2024-03-21

## 2024-04-28 ENCOUNTER — HEALTH MAINTENANCE LETTER (OUTPATIENT)
Age: 74
End: 2024-04-28

## 2024-05-08 DIAGNOSIS — R39.9 LOWER URINARY TRACT SYMPTOMS (LUTS): ICD-10-CM

## 2024-05-14 RX ORDER — TAMSULOSIN HYDROCHLORIDE 0.4 MG/1
0.4 CAPSULE ORAL DAILY
Qty: 90 CAPSULE | Refills: 0 | Status: SHIPPED | OUTPATIENT
Start: 2024-05-14

## 2024-05-14 NOTE — TELEPHONE ENCOUNTER
LVD:  5/19/2023  [facility] Phuong Green PA-C  Urology Elevated prostate specific antigen     Refilled per protocol.

## 2025-03-12 ENCOUNTER — MYC MEDICAL ADVICE (OUTPATIENT)
Dept: FAMILY MEDICINE | Facility: CLINIC | Age: 75
End: 2025-03-12
Payer: COMMERCIAL

## 2025-03-12 NOTE — TELEPHONE ENCOUNTER
Patient Quality Outreach    Patient is due for the following:   Physical Annual Wellness Visit      Topic Date Due    Flu Vaccine (1) 09/01/2024    COVID-19 Vaccine (3 - 2024-25 season) 09/01/2024       Action(s) Taken:   Schedule a Annual Wellness Visit    Type of outreach:    Sent [email or web address] message.  Call in 1 week if not read/completed; send letter in 2 weeks if not returned/read.     Questions for provider review:    None           Terrie Shelby MA  Chart routed to Care Team.

## 2025-03-20 NOTE — TELEPHONE ENCOUNTER
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   Appears he canceled his cardiology appt last year in May 2024.  Is he seeing a cardiologist anywhere?   I strongly advise a visit and restarting his medications- statin, aspirin. metoprolol.  Lyubov Almanza PA-C

## 2025-03-20 NOTE — TELEPHONE ENCOUNTER
Spoke with patient. He refused to schedule annual. I mentioned he'd need one to keep medications current. He then stated that he hasn't been taking any medications and he feels wonderful.  Advised he give us a call if he has any future concerns or questions.  Routing to provider as TOSHA.    Terrie Shelby MA

## 2025-03-21 NOTE — TELEPHONE ENCOUNTER
RN Triage    Patient Contact    Attempt # 1    Was call answered?  No.  Left message on voicemail with information to call me back. Mychart sent    Upon call back please relay below message.    Anna Paz RN  [facility] - Registered Nurse  Clinic Triage [place]   March 21, 2025

## 2025-03-24 NOTE — TELEPHONE ENCOUNTER
Spoke with patient, RN strongly advised follow up with cardiology and for patient to resume medications. Agreed to be seen for annual wellness exam to discuss medication management and cardiology follow up.     Sue Cuello RN, BSN

## 2025-04-10 ENCOUNTER — OFFICE VISIT (OUTPATIENT)
Dept: FAMILY MEDICINE | Facility: CLINIC | Age: 75
End: 2025-04-10
Payer: COMMERCIAL

## 2025-04-10 VITALS
DIASTOLIC BLOOD PRESSURE: 75 MMHG | RESPIRATION RATE: 18 BRPM | SYSTOLIC BLOOD PRESSURE: 135 MMHG | BODY MASS INDEX: 32.05 KG/M2 | HEIGHT: 67 IN | TEMPERATURE: 97.9 F | HEART RATE: 90 BPM | OXYGEN SATURATION: 97 % | WEIGHT: 204.2 LBS

## 2025-04-10 DIAGNOSIS — Z95.1 S/P CABG (CORONARY ARTERY BYPASS GRAFT): ICD-10-CM

## 2025-04-10 DIAGNOSIS — Z00.00 ENCOUNTER FOR MEDICARE ANNUAL WELLNESS EXAM: Primary | ICD-10-CM

## 2025-04-10 DIAGNOSIS — R73.01 IMPAIRED FASTING GLUCOSE: ICD-10-CM

## 2025-04-10 DIAGNOSIS — R39.9 LOWER URINARY TRACT SYMPTOMS (LUTS): ICD-10-CM

## 2025-04-10 DIAGNOSIS — Z87.891 HISTORY OF TOBACCO USE, PRESENTING HAZARDS TO HEALTH: ICD-10-CM

## 2025-04-10 DIAGNOSIS — E78.5 HYPERLIPIDEMIA LDL GOAL <70: ICD-10-CM

## 2025-04-10 DIAGNOSIS — Z95.1 S/P CABG (CORONARY ARTERY BYPASS GRAFT): Primary | ICD-10-CM

## 2025-04-10 LAB
ALBUMIN SERPL BCG-MCNC: 4.2 G/DL (ref 3.5–5.2)
ALP SERPL-CCNC: 76 U/L (ref 40–150)
ALT SERPL W P-5'-P-CCNC: 25 U/L (ref 0–70)
ANION GAP SERPL CALCULATED.3IONS-SCNC: 12 MMOL/L (ref 7–15)
AST SERPL W P-5'-P-CCNC: 28 U/L (ref 0–45)
BILIRUB SERPL-MCNC: 0.4 MG/DL
BUN SERPL-MCNC: 14 MG/DL (ref 8–23)
CALCIUM SERPL-MCNC: 9.3 MG/DL (ref 8.8–10.4)
CHLORIDE SERPL-SCNC: 104 MMOL/L (ref 98–107)
CHOLEST SERPL-MCNC: 199 MG/DL
CREAT SERPL-MCNC: 0.88 MG/DL (ref 0.67–1.17)
EGFRCR SERPLBLD CKD-EPI 2021: 90 ML/MIN/1.73M2
EST. AVERAGE GLUCOSE BLD GHB EST-MCNC: 126 MG/DL
FASTING STATUS PATIENT QL REPORTED: YES
FASTING STATUS PATIENT QL REPORTED: YES
GLUCOSE SERPL-MCNC: 133 MG/DL (ref 70–99)
HBA1C MFR BLD: 6 % (ref 0–5.6)
HCO3 SERPL-SCNC: 22 MMOL/L (ref 22–29)
HDLC SERPL-MCNC: 59 MG/DL
LDLC SERPL CALC-MCNC: 93 MG/DL
NONHDLC SERPL-MCNC: 140 MG/DL
POTASSIUM SERPL-SCNC: 4.7 MMOL/L (ref 3.4–5.3)
PROT SERPL-MCNC: 7 G/DL (ref 6.4–8.3)
PSA SERPL DL<=0.01 NG/ML-MCNC: 3.85 NG/ML (ref 0–6.5)
SODIUM SERPL-SCNC: 138 MMOL/L (ref 135–145)
TRIGL SERPL-MCNC: 235 MG/DL

## 2025-04-10 RX ORDER — ASPIRIN 81 MG/1
81 TABLET ORAL DAILY
COMMUNITY
Start: 2025-04-10

## 2025-04-10 RX ORDER — ROSUVASTATIN CALCIUM 40 MG/1
40 TABLET, COATED ORAL DAILY
Qty: 90 TABLET | Refills: 3 | Status: SHIPPED | OUTPATIENT
Start: 2025-04-10

## 2025-04-10 RX ORDER — TAMSULOSIN HYDROCHLORIDE 0.4 MG/1
0.4 CAPSULE ORAL DAILY
Qty: 90 CAPSULE | Refills: 3 | Status: SHIPPED | OUTPATIENT
Start: 2025-04-10

## 2025-04-10 SDOH — HEALTH STABILITY: PHYSICAL HEALTH: ON AVERAGE, HOW MANY MINUTES DO YOU ENGAGE IN EXERCISE AT THIS LEVEL?: 20 MIN

## 2025-04-10 SDOH — HEALTH STABILITY: PHYSICAL HEALTH: ON AVERAGE, HOW MANY DAYS PER WEEK DO YOU ENGAGE IN MODERATE TO STRENUOUS EXERCISE (LIKE A BRISK WALK)?: 3 DAYS

## 2025-04-10 ASSESSMENT — SOCIAL DETERMINANTS OF HEALTH (SDOH): HOW OFTEN DO YOU GET TOGETHER WITH FRIENDS OR RELATIVES?: MORE THAN THREE TIMES A WEEK

## 2025-04-10 ASSESSMENT — PAIN SCALES - GENERAL: PAINLEVEL_OUTOF10: NO PAIN (0)

## 2025-04-10 NOTE — PROGRESS NOTES
Preventive Care Visit  [facility] ARELIS Almanza PA-C, Family Medicine  Apr 10, 2025      Assessment & Plan     Encounter for Medicare annual wellness exam  Reviewed VS, weight/BMI   Routine screenings see orders  Immunizations: see orders and documentation in HPI. Discussed vaccines coverage as pharmacy benefit.  Health and cancer screening recommendations delivered according to the USPSTF and other appropriate society guidelines  Nutrition and exercise counseling performed.  Advised vaccines at his pharmacy     S/P CABG (coronary artery bypass graft)  He states he really does not want to take any meds and he feels fine.  Discussed risks vs benefits and importance of secondary prevention.   He tolerated the rosuvastatin fine he thinks and would consider restarting it.   He is ok with taking aspirin 81mg daily.   He does not want to take the metoprolol and declines a prescription.   - Lipid panel reflex to direct LDL Non-fasting; Future  - Lipid panel reflex to direct LDL Non-fasting    Hyperlipidemia LDL goal <70  See above. Will reflect being off his statin.   - Lipid panel reflex to direct LDL Non-fasting; Future  - Comprehensive metabolic panel (BMP + Alb, Alk Phos, ALT, AST, Total. Bili, TP); Future  - Lipid panel reflex to direct LDL Non-fasting  - Comprehensive metabolic panel (BMP + Alb, Alk Phos, ALT, AST, Total. Bili, TP)    Lower urinary tract symptoms (LUTS)  Reviewed urology note from 2023. He had LUTs with an elevated PSA. Appears the PSA did trend down with treating the BPH. He has been off tamsulosin a while (a year?)  Discussed risks of untreated BPH .   He will restart tamsulosin.  Will check PSA today. If elevated again would ref back to urology  - tamsulosin (FLOMAX) 0.4 MG capsule; Take 1 capsule (0.4 mg) by mouth daily.  - PSA, screen; Future  - PSA, screen    History of tobacco use, presenting hazards to health  Reviewed prior CT results. Last screen Nov 2023.  Take the naltrexone pill for one more week then stop  Vivitrol injection will be given today for alcohol use  Do not use any opiates when on this ex: (lortab, heroin, norco, percocet, oxycodone, oxycontin, morphine, methadone)  Wear bracelet indicating you are on vivitrol in case you were ever in an accident and incapacitated and unable to tell the MD you are on naltrexone.  They would want to use alternatives to opiates.    "Discussed recommendation for annual screening. He states he wants to do every 2 years and his preference is to wait until Nov 2025. Order placed.   - CT Chest Lung Cancer Scrn Low Dose wo; Future    Impaired fasting glucose  He is NOT fasting today. Will screen an a1c  - Comprehensive metabolic panel (BMP + Alb, Alk Phos, ALT, AST, Total. Bili, TP); Future  - Hemoglobin A1c; Future  - Comprehensive metabolic panel (BMP + Alb, Alk Phos, ALT, AST, Total. Bili, TP)  - Hemoglobin A1c    Patient has been advised of split billing requirements and indicates understanding: Yes        BMI  Estimated body mass index is 32.43 kg/m  as calculated from the following:    Height as of this encounter: 1.69 m (5' 6.54\").    Weight as of this encounter: 92.6 kg (204 lb 3.2 oz).   Weight management plan: Discussed healthy diet and exercise guidelines    Counseling  Appropriate preventive services were addressed with this patient via screening, questionnaire, or discussion as appropriate for fall prevention, nutrition, physical activity, Tobacco-use cessation, social engagement, weight loss and cognition.  Checklist reviewing preventive services available has been given to the patient.  Reviewed patient's diet, addressing concerns and/or questions.   He is at risk for lack of exercise and has been provided with information to increase physical activity for the benefit of his well-being.   The patient reports drinking more than 3 alcoholic drinks per day and/or more than 7 drhnks per week. The patient was counseled and given information about possible harmful effects of excessive alcohol intake.    Follow Up: see above. Additionally patient was instructed to contact clinic for worsening symptoms, non-improvement in time frame discussed, and for questions regarding treatment plan.   For virtual visits, the patient was advised to be seen for in person evaluation if symptoms or condition are worsening or non-improvement as expected. " "  JACQUES Zimmerman   Jcarlos is a 75 year old, presenting for the following:    - pt had a pinched nerve in R arm for about a month, maybe from sleeping on R side, happened early/mid February  - gets up ~3x/night to use the bathroom, has trouble getting back to sleep    Physical        4/10/2025     9:26 AM   Additional Questions   Roomed by AG   Accompanied by self           HPI  Routine Health Maintenance:  Immunizations: RSV advised to do at pharmacy   Colonoscopy: 2022. Repeat in 5 yr   CT lung screening: last CT was from Nov 2023. Smoking history  > 40 yr with an estimated 32 pack yr hx. He does not want to do now. Will consider in November when it has been 2  yrs.     Fasting: non-fasting. I had 2 eggs, and potato and OJ.    Wife wants to move to [place] to be by [employer]. He doesn't want to move and plans to stay in [place] even if she goes.     S/p CABG Nov 2023-   They quit refilling my medications so I quit taking them. \"It worked out just fine. I don't like to take meds\".   He states he does not want to take any of his heart medications. I'd be ok if I didn't wake up. I am not suicidal- It took me 70 years to wear out those blood vessels I won't live another 70 to wear these out.    He tolerated the rosuvastatin fine he thinks. He would consider restarting it.   He is ok with taking aspirin 81mg daily.   He does not want to take the metoprolol and declines a prescription.       Elevated PSA and LUTs- Saw urology 05/2023. Started on tamsulosin. His repeat PSA did improve.   \"I ran out of that med and its ok.\"  I empty it ok. I drink a lot of water during the day. Up 3x per night and it doesn't bother me.    Component      Latest Ref Rng 3/11/2021  12:24 PM 3/17/2022  11:30 AM 2/20/2023  11:15 AM 5/19/2023  8:20 AM   PSA      0.00 - 4.00 ug/L 3.21  4.30 (H)  6.74 (H)  3.78       Legend:  (H) High      Advance Care Planning  Patient does not have a Health Care Directive: Discussed advance care " planning with patient; information given to patient to review.      4/10/2025   General Health   How would you rate your overall physical health? Good   Feel stress (tense, anxious, or unable to sleep) Only a little   (!) STRESS CONCERN      4/10/2025   Nutrition   Diet: Regular (no restrictions)         4/10/2025   Exercise   Days per week of moderate/strenous exercise 3 days   Average minutes spent exercising at this level 20 min         4/10/2025   Social Factors   Frequency of gathering with friends or relatives More than three times a week   Worry food won't last until get money to buy more No   Food not last or not have enough money for food? No   Do you have housing? (Housing is defined as stable permanent housing and does not include staying ouside in a car, in a tent, in an abandoned building, in an overnight shelter, or couch-surfing.) Yes   Are you worried about losing your housing? No   Lack of transportation? No   Unable to get utilities (heat,electricity)? No         4/10/2025   Fall Risk   Fallen 2 or more times in the past year? No   Trouble with walking or balance? No          4/10/2025   Activities of Daily Living- Home Safety   Needs help with the following daily activites None of the above   Safety concerns in the home None of the above         4/10/2025   Dental   Dentist two times every year? Yes         4/10/2025   Hearing Screening   Hearing concerns? None of the above         4/10/2025   Driving Risk Screening   Patient/family members have concerns about driving No         4/10/2025   General Alertness/Fatigue Screening   Have you been more tired than usual lately? No         4/10/2025   Urinary Incontinence Screening   Bothered by leaking urine in past 6 months No           Today's PHQ-2 Score:       4/10/2025     8:52 AM   PHQ-2 ( 1999 [employer])   Q1: Little interest or pleasure in doing things 0   Q2: Feeling down, depressed or hopeless 0   PHQ-2 Score 0    Q1: Little interest or pleasure  in doing things Not at all   Q2: Feeling down, depressed or hopeless Not at all   PHQ-2 Score 0       Patient-reported           4/10/2025   Substance Use   Alcohol more than 3/day or more than 7/wk Yes   How often do you have a drink containing alcohol 2 to 3 times a week   How many alcohol drinks on typical day 1 or 2   How often do you have 5+ drinks at one occasion Never   Audit 2/3 Score 0   How often not able to stop drinking once started Never   How often failed to do what normally expected Never   How often needed first drink in am after a heavy drinking session Never   How often feeling of guilt or remorse after drinking Never   How often unable to remember what happened the night before Never   Have you or someone else been injured because of your drinking No   Has anyone been concerned or suggested you cut down on drinking No   TOTAL SCORE - AUDIT 3   Do you have a current opioid prescription? No   How severe/bad is pain from 1 to 10? 0/10 (No Pain)   Do you use any other substances recreationally? No     Social History     Tobacco Use    Smoking status: Former     Current packs/day: 0.00     Average packs/day: 0.8 packs/day for 40.0 years (32.0 ttl pk-yrs)     Types: Cigarettes     Start date: 1975     Quit date: 2015     Years since quittin.9     Passive exposure: Never    Smokeless tobacco: Never    Tobacco comments:     quit smoking 2015   Vaping Use    Vaping status: Never Used   Substance Use Topics    Alcohol use: Yes     Alcohol/week: 10.0 standard drinks of alcohol     Types: 10 Standard drinks or equivalent per week    Drug use: No           4/10/2025   AAA Screening   Family history of Abdominal Aortic Aneurysm (AAA)? No   ASCVD Risk   The 10-year ASCVD risk score (Tanya SCHULTZ, et al., 2019) is: 23.8%    Values used to calculate the score:      Age: 75 years      Sex: Male      Is Non- : No      Diabetic: No      Tobacco smoker: No      Systolic  Blood Pressure: 135 mmHg      Is BP treated: No      HDL Cholesterol: 60 mg/dL      Total Cholesterol: 198 mg/dL            Reviewed and updated as needed this visit by Provider                    Past Medical History:   Diagnosis Date    ED (erectile dysfunction)     Hx of colonic polyp 01/01/2001    Status post coronary angiogram 11/09/2023     Past Surgical History:   Procedure Laterality Date    BYPASS GRAFT ARTERY CORONARY N/A 11/16/2023    Procedure: Median Sternotomy, Cardiopulmonary Bypass, Intraoperative Transesophageal Echocardiogram, Coronary Artery Bypass Graft x 2, Left Internal Mammary Artery [place], Endoscopic [place] of Left Greater Saphenous Vein, Intraoperative Epi-Aortic Untrasound;  Surgeon: Mulvihill, Michael, MD;  Location: [facility] OR    COLONOSCOPY  2001    Reported as having a polyp removed    COLONOSCOPY  2004    Clear by report    COLONOSCOPY N/A 1/29/2016    Procedure: COLONOSCOPY;  Surgeon: Juanjo Martínez MD;  Location: [facility] OR    COLONOSCOPY N/A 4/19/2022    Procedure: COLONOSCOPY, FLEXIBLE, WITH LESION REMOVAL USING SNARE;  Surgeon: Jian Mixon DO;  Location: [facility] OR    COLONOSCOPY WITH CO2 INSUFFLATION N/A 1/29/2016    Procedure: COLONOSCOPY WITH CO2 INSUFFLATION;  Surgeon: Juanjo Martínez MD;  Location: [facility] OR    COLONOSCOPY WITH CO2 INSUFFLATION N/A 4/19/2022    Procedure: COLONOSCOPY, WITH CO2 INSUFFLATION;  Surgeon: Jian Mixon DO;  Location: [facility] OR    CV CORONARY ANGIOGRAM N/A 11/9/2023    Procedure: Coronary Angiogram;  Surgeon: Channing Marin MD;  Location: [facility] CARDIAC CATH LAB    CV PCI N/A 11/9/2023    Procedure: Percutaneous Coronary Intervention;  Surgeon: Channing Marin MD;  Location:  [facility] CARDIAC CATH LAB     Lab work is in process  Labs reviewed in EPIC  BP Readings from Last 3 Encounters:   04/10/25 135/75   12/19/23 110/78   12/11/23 112/72    Wt Readings from Last 3 Encounters:   04/10/25 92.6 kg (204 lb 3.2  oz)   12/19/23 88.8 kg (195 lb 12.8 oz)   12/11/23 86.8 kg (191 lb 6.4 oz)                  Patient Active Problem List   Diagnosis    Hyperlipidemia with target LDL less than 160    History of colonic polyps    Erectile dysfunction, unspecified erectile dysfunction type    History of tobacco use    Impaired fasting glucose    Chest pain    Abnormal cardiovascular stress test    Status post coronary angiogram    S/P CABG (coronary artery bypass graft)     Past Surgical History:   Procedure Laterality Date    BYPASS GRAFT ARTERY CORONARY N/A 11/16/2023    Procedure: Median Sternotomy, Cardiopulmonary Bypass, Intraoperative Transesophageal Echocardiogram, Coronary Artery Bypass Graft x 2, Left Internal Mammary Artery [place], Endoscopic [place] of Left Greater Saphenous Vein, Intraoperative Epi-Aortic Untrasound;  Surgeon: Mulvihill, Michael, MD;  Location: [facility] OR    COLONOSCOPY  2001    Reported as having a polyp removed    COLONOSCOPY  2004    Clear by report    COLONOSCOPY N/A 1/29/2016    Procedure: COLONOSCOPY;  Surgeon: Juanjo Martínez MD;  Location: [facility]    COLONOSCOPY N/A 4/19/2022    Procedure: COLONOSCOPY, FLEXIBLE, WITH LESION REMOVAL USING SNARE;  Surgeon: Jian Mixon DO;  Location: [facility]    COLONOSCOPY WITH CO2 INSUFFLATION N/A 1/29/2016    Procedure: COLONOSCOPY WITH CO2 INSUFFLATION;  Surgeon: Juanjo Martínez MD;  Location: [facility]    COLONOSCOPY WITH CO2 INSUFFLATION N/A 4/19/2022    Procedure: COLONOSCOPY, WITH CO2 INSUFFLATION;  Surgeon: Jian Mixon DO;  Location: [facility] OR    CV CORONARY ANGIOGRAM N/A 11/9/2023    Procedure: Coronary Angiogram;  Surgeon: Channing Marin MD;  Location:  [facility] CARD[facility] C[facility]    CV PCI N/A 11/9/2023    Procedure: Percutaneous Coronary Intervention;  Surgeon: Channing Marin MD;  Location:  [facility] CARDIAC CATH LAB       Social History     Tobacco Use    Smoking status: Former     Current packs/day: 0.00      Average packs/day: 0.8 packs/day for 40.0 years (32.0 ttl pk-yrs)     Types: Cigarettes     Start date: 1975     Quit date: 2015     Years since quittin.9     Passive exposure: Never    Smokeless tobacco: Never    Tobacco comments:     quit smoking 2015   Substance Use Topics    Alcohol use: Yes     Alcohol/week: 10.0 standard drinks of alcohol     Types: 10 Standard drinks or equivalent per week     Family History   Problem Relation Age of Onset    Cervical Cancer Mother     Lung Cancer Mother     Heart Disease Father         CHF    Emphysema Father     Coronary Artery Disease Father         MI in 50s    Diabetes No family hx of     Hypertension No family hx of     Breast Cancer No family hx of     Prostate Cancer No family hx of     Mental Illness No family hx of     Osteoporosis No family hx of     Obesity No family hx of          Current Outpatient Medications   Medication Sig Dispense Refill    aspirin 81 MG EC tablet Take 1 tablet (81 mg) by mouth daily.      rosuvastatin (CRESTOR) 40 MG tablet Take 1 tablet (40 mg) by mouth daily. 90 tablet 3    tamsulosin (FLOMAX) 0.4 MG capsule Take 1 capsule (0.4 mg) by mouth daily. 90 capsule 3    levalbuterol (XOPENEX HFA) 45 MCG/ACT inhaler Inhale 2 puffs into the lungs every 4 hours as needed for shortness of breath or wheezing 15 g 1    nitroGLYcerin (NITROSTAT) 0.4 MG sublingual tablet For chest pain place 1 tablet under the tongue every 5 minutes for 3 doses. If symptoms persist 5 minutes after 1st dose call 911. 25 tablet 3    tamsulosin (FLOMAX) 0.4 MG capsule Take 1 capsule (0.4 mg) by mouth daily *PLEASE SCHEDULE APPT. FOR REFILLS 90 capsule 0     Allergies   Allergen Reactions    Ampicillin      Current providers sharing in care for this patient include:  Patient Care Team:  Lyubov Almanza PA-C as PCP - General (Physician Assistant - Medical)  Phuong Hooper PA-C as Physician Assistant (Urology)  Lyubov Almanza PA-C as  "Assigned PCP  Chris Villa DPM as Assigned Musculoskeletal Provider  Alexandra Longoria, RN as Registered Nurse  Charlotte Colin APRN CNP as Nurse Practitioner (Anesthesiology)  Virginia Kemp PA-C as Assigned Heart and Vascular Provider  Mulvihill, Michael, MD as Assigned Heart and Vascular Surgical Provider    The following health maintenance items are reviewed in Epic and correct as of today:  Health Maintenance   Topic Date Due    RSV VACCINE (1 - Risk 60-74 years 1-dose series) Never done    MEDICARE ANNUAL WELLNESS VISIT  02/20/2024    LIPID  02/20/2024    ANNUAL REVIEW OF HM ORDERS  02/20/2024    INFLUENZA VACCINE (1) 09/01/2024    COVID-19 Vaccine (3 - 2024-25 season) 09/01/2024    LUNG CANCER SCREENING  12/27/2024    DTAP/TDAP/TD IMMUNIZATION (2 - Td or Tdap) 12/03/2025    FALL RISK ASSESSMENT  04/10/2026    DIABETES SCREENING  11/27/2026    ADVANCE CARE PLANNING  03/17/2027    COLORECTAL CANCER SCREENING  04/19/2027    PHQ-2 (once per calendar year)  Completed    Pneumococcal Vaccine: 50+ Years  Completed    ZOSTER IMMUNIZATION  Completed    AORTIC ANEURYSM SCREENING (SYSTEM ASSIGNED)  Completed    HEPATITIS C SCREENING  Addressed    HPV IMMUNIZATION  Aged Out    MENINGITIS IMMUNIZATION  Aged Out         Review of Systems  Constitutional, HEENT, cardiovascular, pulmonary, gi and gu systems are negative, except as otherwise noted.     Objective    Exam  /75 (BP Location: Left arm, Patient Position: Chair, Cuff Size: Adult Regular)   Pulse 90   Temp 97.9  F (36.6  C) (Temporal)   Resp 18   Ht 1.69 m (5' 6.54\")   Wt 92.6 kg (204 lb 3.2 oz)   SpO2 97%   BMI 32.43 kg/m     Estimated body mass index is 32.43 kg/m  as calculated from the following:    Height as of this encounter: 1.69 m (5' 6.54\").    Weight as of this encounter: 92.6 kg (204 lb 3.2 oz).    Physical Exam  GENERAL: alert and no distress  EYES: Eyes grossly normal to inspection, PERRL and conjunctivae and sclerae normal  HENT: ear " canals and TM's normal, nose and mouth without ulcers or lesions  NECK: no adenopathy, no asymmetry, masses, or scars  RESP: lungs clear to auscultation - no rales, rhonchi or wheezes  CV: regular rate and rhythm, normal S1 S2, no S3 or S4, no murmur, click or rub, no peripheral edema  ABDOMEN: soft, nontender, no hepatosplenomegaly, no masses and bowel sounds normal  MS: no gross musculoskeletal defects noted, no edema  SKIN: no suspicious lesions or rashes  NEURO: Normal strength and tone, mentation intact and speech normal  PSYCH: mentation appears normal, affect normal/bright          4/10/2025   Mini Cog   Clock Draw Score 2 Normal   3 Item Recall 3 objects recalled   Mini Cog Total Score 5              Signed Electronically by: Lyubov Almanza PA-C

## 2025-04-10 NOTE — PATIENT INSTRUCTIONS
Patient Education     Instructions from Today's Visit:  Start aspirin 81mg daily.   Please do consider restarting the rosuvastatin once daily. This lowers cholesterol and reduces risk of heart attack and stroke.     Start the tamsulosin again for the urinary symptoms.   You can choose not to take it.   Risks of not treating- obstruction to urination (being unable to empty bladder), bladder losing sensation to tell you to go (needing to self cath), and strain on the kidneys.     Lung cancer screening this year- your preference is Nov 2025    General Instructions After Your Visit  If you have been seen for a concern and are worsening or not improving as expected, please schedule a follow-up visit or reach out to a member of our care team or nurses if it is urgent.  We have Nurse advice available 24/7 by calling [phone].  For emergencies, please call [phone].    My Clinic Hours  I am in the office Mondays, Wednesdays, and Thursdays. Messages received outside of normal clinic hours and on my days out of the office will be reviewed by our [place] care team, but may not be addressed by me personally until I am back in the office. If you have concerns that cannot wait for my return please call the Nurse advise line [phone].    Test results  You may see your lab or test results before we can make recommendations. This is common, as sometimes we are awaiting on other labs to return or we are out of office on a particular day. Please be patient, and if you don't see a response from me or one of my colleagues within 2-3 business days, and you have a specific concern, please send us a message.    Refills  If you have run out of refills, please schedule a visit. This is generally an indication that you are due for a follow-up visit. All prescriptions are only valid for 1 year from the date written and need a visit annually to renew. Most mental health and chronic diseases we are treating (diabetes, high blood pressure,  etc) require some type of visit every 6 months. We are now offering video visits! However, if physical exams are needed or it is a complex concern, we may ask you to be seen in person.    Physicals & Preventative Visits   These appointment slots fill up fast. Please consider scheduling these 2-3 months in advance to allow for the appointment time that fits you best. If you have medications ordered or other issues addressed that are not preventive at these visits, please be aware there are extra costs associated with this.       Preventive Care Advice   This is general advice given by our system to help you stay healthy. However, your care team may have specific advice just for you. Please talk to your care team about your preventive care needs.  Nutrition  Eat 5 or more servings of fruits and vegetables each day.  Try wheat bread, brown rice and whole grain pasta (instead of white bread, rice, and pasta).  Get enough calcium and vitamin D. Check the label on foods and aim for 100% of the RDA (recommended daily allowance).  Lifestyle  Exercise at least 150 minutes each week  (30 minutes a day, 5 days a week).  Do muscle strengthening activities 2 days a week. These help control your weight and prevent disease.  No smoking.  Wear sunscreen to prevent skin cancer.  Have a dental exam and cleaning every 6 months.  Yearly exams  See your health care team every year to talk about:  Any changes in your health.  Any medicines your care team has prescribed.  Preventive care, family planning, and ways to prevent chronic diseases.  Shots (vaccines)   HPV shots (up to age 26), if you've never had them before.  Hepatitis B shots (up to age 59), if you've never had them before.  COVID-19 shot: Get this shot when it's due.  Flu shot: Get a flu shot every year.  Tetanus shot: Get a tetanus shot every 10 years.  Pneumococcal, hepatitis A, and RSV shots: Ask your care team if you need these based on your risk.  Shingles shot (for age  50 and up)  General health tests  Diabetes screening:  Starting at age 35, Get screened for diabetes at least every 3 years.  If you are younger than age 35, ask your care team if you should be screened for diabetes.  Cholesterol test: At age 39, start having a cholesterol test every 5 years, or more often if advised.  Bone density scan (DEXA): At age 50, ask your care team if you should have this scan for osteoporosis (brittle bones).  Hepatitis C: Get tested at least once in your life.  STIs (sexually transmitted infections)  Before age 24: Ask your care team if you should be screened for STIs.  After age 24: Get screened for STIs if you're at risk. You are at risk for STIs (including HIV) if:  You are sexually active with more than one person.  You don't use condoms every time.  You or a partner was diagnosed with a sexually transmitted infection.  If you are at risk for HIV, ask about PrEP medicine to prevent HIV.  Get tested for HIV at least once in your life, whether you are at risk for HIV or not.  Cancer screening tests  Cervical cancer screening: If you have a cervix, begin getting regular cervical cancer screening tests starting at age 21.  Breast cancer scan (mammogram): If you've ever had breasts, begin having regular mammograms starting at age 40. This is a scan to check for breast cancer.  Colon cancer screening: It is important to start screening for colon cancer at age 45.  Have a colonoscopy test every 10 years (or more often if you're at risk) Or, ask your provider about stool tests like a FIT test every year or Cologuard test every 3 years.  To learn more about your testing options, visit:   .  For help making a decision, visit:   https://bit.ly/mz24990.  Prostate cancer screening test: If you have a prostate, ask your care team if a prostate cancer screening test (PSA) at age 55 is right for you.  Lung cancer screening: If you are a current or former smoker ages 50 to 80, ask your care team if  "ongoing lung cancer screenings are right for you.  For informational purposes only. Not to replace the advice of your health care provider. Copyright   2023 [facility]. All rights reserved. Clinically reviewed by the [facility]. [employer] 585446 - REV 01/24.  9 Ways to Cut Back on Drinking  Maybe you've found yourself drinking more alcohol than you'd prefer. If you want to cut back, here are some ideas to try.    Think before you drink.  Do you really want a drink, or is it just a habit? If you're used to having a drink at a certain time, try doing something else then.     Look for substitutes.  Find some no-alcohol drinks that you enjoy, like flavored seltzer water, tea with honey, or tonic with a slice of lime. Or try alcohol-free beer or \"virgin\" cocktails (without the alcohol).     Drink more water.  Use water to quench your thirst. Drink a glass of water before you have any alcohol. Have another glass along with every drink or between drinks.     Shrink your drink.  For example, have a bottle of beer instead of a pint. Use a smaller glass for wine. Choose drinks with lower alcohol content (ABV%). Or use less liquor and more mixer in cocktails.     Slow down.  It's easy to drink quickly and without thinking about it. Pay attention, and make each drink last longer.     Do the math.  Total up how much you spend on alcohol each month. How much is that a year? If you cut back, what could you do with the money you save?     Take a break.  Choose a day or two each week when you won't drink at all. Notice how you feel on those days, physically and emotionally. How did you sleep? Do you feel better? Over time, add more break days.     Count calories.  Would you like to lose some weight? For some people that's a good motivator for cutting back. Figure out how many calories are in each drink. How many does that add up to in a day? In a week? In a month?     Practice saying no.  Be " "ready when someone offers you a drink. Try: \"Thanks, I've had enough.\" Or \"Thanks, but I'm cutting back.\" Or \"No, thanks. I feel better when I drink less.\"   Current as of: August 20, 2024  Content Version: 14.4 2024-2025 [employer].   Care instructions adapted under license by your healthcare professional. If you have questions about a medical condition or this instruction, always ask your healthcare professional. [employer] disclaims any warranty or liability for your use of this information.     "

## 2025-04-15 ENCOUNTER — TELEPHONE (OUTPATIENT)
Dept: FAMILY MEDICINE | Facility: CLINIC | Age: 75
End: 2025-04-15
Payer: COMMERCIAL

## 2025-04-15 NOTE — TELEPHONE ENCOUNTER
RN called and relayed message to patient Patient verbalized understanding and all questions answered.     Anna Paz RN  [facility] - Registered Nurse  Clinic Triage [place]   April 15, 2025

## 2025-04-15 NOTE — TELEPHONE ENCOUNTER
Sashaletha Jcarlos,  Your recent test results show:     Diabetes Screening:  Your results are above normal (but are stable compared to last year) .  You do not have diabetes but are at risk for developing diabetes.  There are steps you can take to delay the onset of diabetes and reduce your risk including:  Eliminate pop and sweetened beverages  Reduce the amount and serving sizes of carbohydrates in your diet (breads, pasta, rice, crackers, chips, candy, sweets)  Get regular exercise and lead an active lifestyle  Weight management  We will continue to monitor this trend. Plan to repeat these tests in 12 months.  Please let me know if you would like a referral to a nutritionist to learn more about preventing diabetes.     Hemoglobin a1c: a measure of blood sugar control over the past 3 months:  Normal range: 4.0-5.6%  Prediabetes range: 5.7-6.4%  Diabetes range: 6.5% and higher          Kidney function (serum creatinine and GFR),  electrolyte tests, and liver tests are normal.     Cholesterol- restart the rosuvastatin daily. Plan to repeat a fasting cholesterol in 2-3 months.     PSA is in the normal range and stable compared to prior.        Lyubov Lyn PA-C Take care, Katie R. Peterson, PA-C

## (undated) DEVICE — SU SILK 0 TIE 6X30" A306H

## (undated) DEVICE — SU PROLENE 7-0 BV-1DA 4X24" M8702

## (undated) DEVICE — DRAPE IOBAN INCISE 23X17" 6650EZ

## (undated) DEVICE — LINEN GOWN XLG 5407

## (undated) DEVICE — SU PROLENE 5-0 RB-2DA 30" 8710H

## (undated) DEVICE — SU STEEL 6 CCS 4X18" M654G

## (undated) DEVICE — SU DERMABOND ADVANCED .7ML DNX12

## (undated) DEVICE — Device

## (undated) DEVICE — CLIP HORIZON SM RED WIDE SLOT 001201

## (undated) DEVICE — DRAIN CHEST TUBE 28FR STR 8028

## (undated) DEVICE — TUBING SUCTION 10'X3/16" N510

## (undated) DEVICE — SOL NACL 0.9% 10ML VIAL 0409-4888-02

## (undated) DEVICE — PROTECTOR ARM ONE-STEP TRENDELENBURG 40418

## (undated) DEVICE — PREP CHLORAPREP 26ML TINTED HI-LITE ORANGE 930815

## (undated) DEVICE — BNDG ELASTIC 6"X5YDS STERILE 6611-6S

## (undated) DEVICE — BLADE KNIFE BEAVER MINI STR BEAVER6900

## (undated) DEVICE — SU VICRYL 0 CTX 36" J370H

## (undated) DEVICE — SOL NACL 0.9% IRRIG 3000ML BAG 2B7477

## (undated) DEVICE — POSITIONER ASSIST ESSTECH 3S T401210S

## (undated) DEVICE — DRAIN CHEST TUBE RIGHT ANGLED 32FR 8132

## (undated) DEVICE — SOL WATER IRRIG 1000ML BOTTLE 07139-09

## (undated) DEVICE — SU VICRYL 2-0 CT-1 27" UND J259H

## (undated) DEVICE — SU ETHIBOND 2-0 SHDA 30" X563H

## (undated) DEVICE — LINEN TOWEL PACK X6 WHITE 5487

## (undated) DEVICE — PUNCH AORTIC 4.0MMX8" RCB40

## (undated) DEVICE — SU PROLENE 4-0 RB-1DA 36" 8557H

## (undated) DEVICE — ESU ELEC BLADE 2.75" COATED/INSULATED E1455

## (undated) DEVICE — BLADE SAW STERNAL 20X30MM KM-32

## (undated) DEVICE — CONNECTOR SIMS TUBING FOR CHEST TUBES 361

## (undated) DEVICE — SUCTION MANIFOLD NEPTUNE 2 SYS 4 PORT 0702-020-000

## (undated) DEVICE — SU VICRYL 0 CT-1 CR 8X18" J740D

## (undated) DEVICE — SU PROLENE 6-0 C-1DA 30" 8706H

## (undated) DEVICE — SLEEVE TR BAND RADIAL COMPRESSION DEVICE 24CM TRB24-REG

## (undated) DEVICE — CLIP HORIZON LG ORANGE 004200

## (undated) DEVICE — SPONGE KITTNER 30-101

## (undated) DEVICE — SUCTION CATH AIRLIFE TRI-FLO W/CONTROL PORT 14FR  T60C

## (undated) DEVICE — ESU ELEC BLADE E-SEP INSULATED NEPTUNE 70MM 0703-070-002

## (undated) DEVICE — TUBING PRESSURE 30"

## (undated) DEVICE — SUCTION DRY CHEST DRAIN OASIS 3600-100

## (undated) DEVICE — BLADE CLIPPER SGL USE 9680

## (undated) DEVICE — CLIP HORIZON MED BLUE 002200

## (undated) DEVICE — DRSG DRAIN 4X4" 7086

## (undated) DEVICE — SPONGE LAP 18X18" X8435

## (undated) DEVICE — BLOWER/MISTER CLEARVIEW 22150

## (undated) DEVICE — SOL NACL 0.9% IRRIG 1000ML BOTTLE 2F7124

## (undated) DEVICE — KIT ENDO VASOVIEW HEMOPRO 2 VH-4000

## (undated) DEVICE — LINEN TOWEL PACK X30 5481

## (undated) DEVICE — BLADE KNIFE BEAVER MICROSHARP GREEN 377515

## (undated) DEVICE — TIES BANDING T50R

## (undated) DEVICE — MANIFOLD KIT ANGIO AUTOMATED 014613

## (undated) DEVICE — BNDG ELASTIC 4"X5YDS STERILE 6611-4S

## (undated) DEVICE — DRAPE FLUID WARMING 52 X 60" ORS-321

## (undated) DEVICE — DRSG TELFA 3X8" 1238

## (undated) DEVICE — SU VICRYL 3-0 FS-1 27" J442H

## (undated) DEVICE — SU PROLENE 4-0 SHDA 36" 8521H

## (undated) DEVICE — PACK ADULT HEART UMMC PV15CG92D

## (undated) DEVICE — CANNULA VESSEL DLP  30001

## (undated) DEVICE — ESU ELEC BLADE 6" COATED/INSULATED E1455-6

## (undated) DEVICE — CLIP SPRING FOGARTY SOFTJAW CSOFT6

## (undated) DEVICE — SHTH INTRO 0.021IN ID 6FR DIA

## (undated) DEVICE — TUBING INSUFFLATION PNEUMOCLEAR 0620050100

## (undated) DEVICE — SU ETHIBOND 3-0 BBDA 36" X588H

## (undated) DEVICE — ANTIFOG SOLUTION W/FOAM PAD CF-1002

## (undated) DEVICE — DRSG ABDOMINAL 07 1/2X8" 7197D

## (undated) DEVICE — DRAIN CHEST TUBE 32FR STR 8032

## (undated) DEVICE — DEFIB PRO-PADZ LVP LQD GEL ADULT 8900-2105-01

## (undated) DEVICE — SUCTION TIP YANKAUER STR K87

## (undated) DEVICE — ANTIFOG SOLUTION W/FOAM PAD 31142527

## (undated) DEVICE — ESU HOLSTER PLASTIC DISP E2400

## (undated) DEVICE — SURGICEL HEMOSTAT 4X8" 1952

## (undated) DEVICE — ESU PENCIL SMOKE EVAC W/ROCKER SWITCH 0703-047-000

## (undated) DEVICE — SU STEEL MYO/WIRE II STERNOTOMY 8 BE-1 3X14" 048-217

## (undated) DEVICE — WIPES FOLEY CARE SURESTEP PROVON DFC100

## (undated) DEVICE — PACK HEART LEFT CUSTOM

## (undated) DEVICE — SUCTION SLEEVE NEPTUNE 2 165MM 0703-005-165

## (undated) DEVICE — BONE WAX OSTENE 2.5GM BW-012

## (undated) DEVICE — LEAD PACER MYOCARDIAL BIPOLAR TEMPORARY 53CM 6495F

## (undated) DEVICE — DRAPE ISOLATION BAG 1003

## (undated) DEVICE — PACK GOWN 3/PK DISP XL SBA32GPFCB

## (undated) DEVICE — SPECIMEN CONTAINER 5OZ STERILE 2600SA

## (undated) DEVICE — KIT HAND CONTROL ACIST 016795

## (undated) DEVICE — PREP CHLORAPREP 26ML TINTED ORANGE  260815

## (undated) DEVICE — TUBING SUCTION DRAINAGE PLEURAL DUAL 8884714200

## (undated) DEVICE — SU PLEDGET SOFT TFE 3/8"X3/26"X1/16" PCP40

## (undated) DEVICE — SU PROLENE 3-0 SHDA 36" 8522H

## (undated) DEVICE — SYR 01ML 27GA 0.5" NDL TBC 309623

## (undated) RX ORDER — CHLORHEXIDINE GLUCONATE ORAL RINSE 1.2 MG/ML
SOLUTION DENTAL
Status: DISPENSED
Start: 2023-11-16

## (undated) RX ORDER — FAMOTIDINE 20 MG/1
TABLET, FILM COATED ORAL
Status: DISPENSED
Start: 2023-11-16

## (undated) RX ORDER — PAPAVERINE HYDROCHLORIDE 30 MG/ML
INJECTION INTRAMUSCULAR; INTRAVENOUS
Status: DISPENSED
Start: 2023-11-16

## (undated) RX ORDER — FENTANYL CITRATE 50 UG/ML
INJECTION, SOLUTION INTRAMUSCULAR; INTRAVENOUS
Status: DISPENSED
Start: 2023-11-09

## (undated) RX ORDER — CLINDAMYCIN PHOSPHATE 900 MG/50ML
INJECTION, SOLUTION INTRAVENOUS
Status: DISPENSED
Start: 2023-11-16

## (undated) RX ORDER — HEPARIN SODIUM 1000 [USP'U]/ML
INJECTION, SOLUTION INTRAVENOUS; SUBCUTANEOUS
Status: DISPENSED
Start: 2023-11-16

## (undated) RX ORDER — IVABRADINE 5 MG/1
TABLET, FILM COATED ORAL
Status: DISPENSED
Start: 2023-11-02

## (undated) RX ORDER — ASPIRIN 325 MG
TABLET ORAL
Status: DISPENSED
Start: 2023-11-09

## (undated) RX ORDER — LIDOCAINE HYDROCHLORIDE 10 MG/ML
INJECTION, SOLUTION EPIDURAL; INFILTRATION; INTRACAUDAL; PERINEURAL
Status: DISPENSED
Start: 2023-11-09

## (undated) RX ORDER — FENTANYL CITRATE 50 UG/ML
INJECTION, SOLUTION INTRAMUSCULAR; INTRAVENOUS
Status: DISPENSED
Start: 2022-04-19

## (undated) RX ORDER — SODIUM CHLORIDE 9 MG/ML
INJECTION, SOLUTION INTRAVENOUS
Status: DISPENSED
Start: 2023-11-09

## (undated) RX ORDER — NITROGLYCERIN 0.4 MG/1
TABLET SUBLINGUAL
Status: DISPENSED
Start: 2023-11-02

## (undated) RX ORDER — VANCOMYCIN HYDROCHLORIDE 1 G/20ML
INJECTION, POWDER, LYOPHILIZED, FOR SOLUTION INTRAVENOUS
Status: DISPENSED
Start: 2023-11-16

## (undated) RX ORDER — FENTANYL CITRATE 50 UG/ML
INJECTION, SOLUTION INTRAMUSCULAR; INTRAVENOUS
Status: DISPENSED
Start: 2023-11-16

## (undated) RX ORDER — ACETAMINOPHEN 325 MG/1
TABLET ORAL
Status: DISPENSED
Start: 2023-11-16

## (undated) RX ORDER — METOPROLOL TARTRATE 1 MG/ML
INJECTION, SOLUTION INTRAVENOUS
Status: DISPENSED
Start: 2023-11-02

## (undated) RX ORDER — GABAPENTIN 100 MG/1
CAPSULE ORAL
Status: DISPENSED
Start: 2023-11-16

## (undated) RX ORDER — ASPIRIN 81 MG/1
TABLET ORAL
Status: DISPENSED
Start: 2023-11-16

## (undated) RX ORDER — CALCIUM CHLORIDE 100 MG/ML
INJECTION INTRAVENOUS; INTRAVENTRICULAR
Status: DISPENSED
Start: 2023-11-16

## (undated) RX ORDER — HEPARIN SODIUM 1000 [USP'U]/ML
INJECTION, SOLUTION INTRAVENOUS; SUBCUTANEOUS
Status: DISPENSED
Start: 2023-11-09

## (undated) RX ORDER — CEFAZOLIN SODIUM 1 G/3ML
INJECTION, POWDER, FOR SOLUTION INTRAMUSCULAR; INTRAVENOUS
Status: DISPENSED
Start: 2023-11-16

## (undated) RX ORDER — NICARDIPINE HCL-0.9% SOD CHLOR 1 MG/10 ML
SYRINGE (ML) INTRAVENOUS
Status: DISPENSED
Start: 2023-11-09

## (undated) RX ORDER — METOPROLOL TARTRATE 100 MG
TABLET ORAL
Status: DISPENSED
Start: 2023-11-02

## (undated) RX ORDER — NITROGLYCERIN 5 MG/ML
VIAL (ML) INTRAVENOUS
Status: DISPENSED
Start: 2023-11-09